# Patient Record
Sex: FEMALE | Race: WHITE | Employment: OTHER | URBAN - METROPOLITAN AREA
[De-identification: names, ages, dates, MRNs, and addresses within clinical notes are randomized per-mention and may not be internally consistent; named-entity substitution may affect disease eponyms.]

---

## 2021-01-01 ENCOUNTER — HOSPITAL ENCOUNTER (INPATIENT)
Age: 81
LOS: 6 days | Discharge: HOSPICE | DRG: 840 | End: 2021-06-03
Attending: INTERNAL MEDICINE | Admitting: INTERNAL MEDICINE
Payer: MEDICARE

## 2021-01-01 ENCOUNTER — HOSPITAL ENCOUNTER (INPATIENT)
Age: 81
LOS: 7 days | Discharge: ANOTHER HOSPITAL | DRG: 840 | End: 2021-05-28
Attending: EMERGENCY MEDICINE | Admitting: HOSPITALIST
Payer: MEDICARE

## 2021-01-01 ENCOUNTER — APPOINTMENT (OUTPATIENT)
Dept: GENERAL RADIOLOGY | Age: 81
DRG: 840 | End: 2021-01-01
Attending: HOSPITALIST
Payer: MEDICARE

## 2021-01-01 ENCOUNTER — APPOINTMENT (OUTPATIENT)
Dept: CT IMAGING | Age: 81
DRG: 840 | End: 2021-01-01
Attending: HOSPITALIST
Payer: MEDICARE

## 2021-01-01 ENCOUNTER — APPOINTMENT (OUTPATIENT)
Dept: GENERAL RADIOLOGY | Age: 81
DRG: 840 | End: 2021-01-01
Attending: EMERGENCY MEDICINE
Payer: MEDICARE

## 2021-01-01 ENCOUNTER — APPOINTMENT (OUTPATIENT)
Dept: MRI IMAGING | Age: 81
DRG: 840 | End: 2021-01-01
Attending: HOSPITALIST
Payer: MEDICARE

## 2021-01-01 ENCOUNTER — APPOINTMENT (OUTPATIENT)
Dept: ULTRASOUND IMAGING | Age: 81
DRG: 840 | End: 2021-01-01
Attending: INTERNAL MEDICINE
Payer: MEDICARE

## 2021-01-01 ENCOUNTER — HOSPICE ADMISSION (OUTPATIENT)
Dept: HOSPICE | Facility: HOSPICE | Age: 81
End: 2021-01-01
Payer: MEDICARE

## 2021-01-01 ENCOUNTER — APPOINTMENT (OUTPATIENT)
Dept: CT IMAGING | Age: 81
DRG: 840 | End: 2021-01-01
Attending: EMERGENCY MEDICINE
Payer: MEDICARE

## 2021-01-01 ENCOUNTER — HOSPITAL ENCOUNTER (INPATIENT)
Age: 81
LOS: 8 days | End: 2021-06-11
Attending: INTERNAL MEDICINE | Admitting: INTERNAL MEDICINE
Payer: MEDICARE

## 2021-01-01 ENCOUNTER — APPOINTMENT (OUTPATIENT)
Dept: CT IMAGING | Age: 81
DRG: 840 | End: 2021-01-01
Attending: NURSE PRACTITIONER
Payer: MEDICARE

## 2021-01-01 ENCOUNTER — APPOINTMENT (OUTPATIENT)
Dept: GENERAL RADIOLOGY | Age: 81
End: 2021-01-01
Attending: EMERGENCY MEDICINE
Payer: MEDICARE

## 2021-01-01 ENCOUNTER — HOSPITAL ENCOUNTER (EMERGENCY)
Age: 81
Discharge: HOME | End: 2021-04-12
Attending: EMERGENCY MEDICINE
Payer: MEDICARE

## 2021-01-01 ENCOUNTER — APPOINTMENT (OUTPATIENT)
Dept: MRI IMAGING | Age: 81
DRG: 840 | End: 2021-01-01
Attending: PSYCHIATRY & NEUROLOGY
Payer: MEDICARE

## 2021-01-01 VITALS
OXYGEN SATURATION: 98 % | BODY MASS INDEX: 30.3 KG/M2 | DIASTOLIC BLOOD PRESSURE: 59 MMHG | SYSTOLIC BLOOD PRESSURE: 148 MMHG | WEIGHT: 177.47 LBS | HEART RATE: 87 BPM | HEIGHT: 64 IN | TEMPERATURE: 98.5 F | RESPIRATION RATE: 20 BRPM

## 2021-01-01 VITALS
SYSTOLIC BLOOD PRESSURE: 158 MMHG | TEMPERATURE: 98.4 F | OXYGEN SATURATION: 98 % | HEART RATE: 67 BPM | HEIGHT: 64 IN | BODY MASS INDEX: 30.73 KG/M2 | DIASTOLIC BLOOD PRESSURE: 70 MMHG | WEIGHT: 180 LBS | RESPIRATION RATE: 16 BRPM

## 2021-01-01 VITALS
WEIGHT: 182.7 LBS | HEIGHT: 64 IN | RESPIRATION RATE: 22 BRPM | BODY MASS INDEX: 31.19 KG/M2 | OXYGEN SATURATION: 99 % | SYSTOLIC BLOOD PRESSURE: 119 MMHG | DIASTOLIC BLOOD PRESSURE: 86 MMHG | HEART RATE: 104 BPM | TEMPERATURE: 98.2 F

## 2021-01-01 VITALS
TEMPERATURE: 97.6 F | DIASTOLIC BLOOD PRESSURE: 75 MMHG | SYSTOLIC BLOOD PRESSURE: 175 MMHG | HEART RATE: 115 BPM | OXYGEN SATURATION: 95 % | RESPIRATION RATE: 24 BRPM

## 2021-01-01 DIAGNOSIS — N17.0 ACUTE RENAL FAILURE WITH TUBULAR NECROSIS (HCC): ICD-10-CM

## 2021-01-01 DIAGNOSIS — R41.82 ALTERED MENTAL STATUS, UNSPECIFIED ALTERED MENTAL STATUS TYPE: ICD-10-CM

## 2021-01-01 DIAGNOSIS — G93.40 ACUTE ENCEPHALOPATHY: Primary | ICD-10-CM

## 2021-01-01 DIAGNOSIS — R53.81 DEBILITY: ICD-10-CM

## 2021-01-01 DIAGNOSIS — C90.00 MULTIPLE MYELOMA NOT HAVING ACHIEVED REMISSION (HCC): ICD-10-CM

## 2021-01-01 DIAGNOSIS — Z51.5 ENCOUNTER FOR PALLIATIVE CARE: ICD-10-CM

## 2021-01-01 DIAGNOSIS — G93.40 ACUTE ENCEPHALOPATHY: ICD-10-CM

## 2021-01-01 DIAGNOSIS — M54.31 SCIATICA OF RIGHT SIDE: Primary | ICD-10-CM

## 2021-01-01 DIAGNOSIS — Z71.89 ACP (ADVANCE CARE PLANNING): ICD-10-CM

## 2021-01-01 DIAGNOSIS — E86.0 DEHYDRATION: ICD-10-CM

## 2021-01-01 DIAGNOSIS — I25.10 CORONARY ARTERY DISEASE INVOLVING NATIVE CORONARY ARTERY OF NATIVE HEART WITHOUT ANGINA PECTORIS: ICD-10-CM

## 2021-01-01 DIAGNOSIS — R52 PAIN: ICD-10-CM

## 2021-01-01 DIAGNOSIS — E83.52 HYPERCALCEMIA: Primary | ICD-10-CM

## 2021-01-01 DIAGNOSIS — I95.0 IDIOPATHIC HYPOTENSION: ICD-10-CM

## 2021-01-01 DIAGNOSIS — E83.52 HYPERCALCEMIA: ICD-10-CM

## 2021-01-01 DIAGNOSIS — E11.9 TYPE 2 DIABETES MELLITUS WITHOUT COMPLICATION, WITHOUT LONG-TERM CURRENT USE OF INSULIN (HCC): ICD-10-CM

## 2021-01-01 DIAGNOSIS — D53.1 OTHER MEGALOBLASTIC ANEMIA: ICD-10-CM

## 2021-01-01 DIAGNOSIS — N39.0 URINARY TRACT INFECTION WITHOUT HEMATURIA, SITE UNSPECIFIED: ICD-10-CM

## 2021-01-01 DIAGNOSIS — R53.1 WEAKNESS: ICD-10-CM

## 2021-01-01 DIAGNOSIS — G89.3 CANCER ASSOCIATED PAIN: ICD-10-CM

## 2021-01-01 LAB
25(OH)D3 SERPL-MCNC: 12.1 NG/ML (ref 30–100)
ALBUMIN SERPL ELPH-MCNC: 3.28 G/DL (ref 3.2–5.6)
ALBUMIN SERPL-MCNC: 2.8 G/DL (ref 3.2–4.6)
ALBUMIN SERPL-MCNC: 3.1 G/DL (ref 3.2–4.6)
ALBUMIN SERPL-MCNC: 3.3 G/DL (ref 3.2–4.6)
ALBUMIN SERPL-MCNC: 3.5 G/DL (ref 3.2–4.6)
ALBUMIN UR ELPH-MCNC: 7.2 MG/DL
ALBUMIN/GLOB SERPL: 0.7 {RATIO}
ALBUMIN/GLOB SERPL: 0.9 {RATIO} (ref 1.2–3.5)
ALBUMIN/GLOB SERPL: 1.1 {RATIO} (ref 1.2–3.5)
ALBUMIN/GLOB SERPL: 1.2 {RATIO} (ref 1.2–3.5)
ALBUMIN/GLOB SERPL: 1.4 {RATIO}
ALP SERPL-CCNC: 131 U/L (ref 50–130)
ALP SERPL-CCNC: 146 U/L (ref 50–130)
ALP SERPL-CCNC: 146 U/L (ref 50–130)
ALP SERPL-CCNC: 147 U/L (ref 50–130)
ALP SERPL-CCNC: 81 U/L (ref 50–136)
ALP SERPL-CCNC: 84 U/L (ref 50–136)
ALP SERPL-CCNC: 85 U/L (ref 50–136)
ALP SERPL-CCNC: 86 U/L (ref 50–136)
ALPHA1 GLOB 24H UR ELPH-MCNC: 0.7 MG/DL
ALPHA1 GLOB SERPL ELPH-MCNC: 0.16 G/DL (ref 0.1–0.4)
ALPHA2 GLOB SERPL ELPH-MCNC: 0.55 G/DL (ref 0.4–1.2)
ALPHA2 GLOB SERPL ELPH-MCNC: 2 MG/DL
ALT SERPL-CCNC: 17 U/L (ref 12–65)
ALT SERPL-CCNC: 18 U/L (ref 12–65)
ALT SERPL-CCNC: 19 U/L (ref 12–65)
ALT SERPL-CCNC: 21 U/L (ref 12–65)
ALT SERPL-CCNC: 30 U/L (ref 12–65)
ALT SERPL-CCNC: 34 U/L (ref 12–65)
ALT SERPL-CCNC: 35 U/L (ref 12–65)
ALT SERPL-CCNC: 38 U/L (ref 12–65)
AMMONIA PLAS-SCNC: 17 UMOL/L (ref 24.9–68)
AMMONIA PLAS-SCNC: 43 UMOL/L (ref 11–32)
AMMONIA PLAS-SCNC: 52 UMOL/L (ref 24.9–68)
ANION GAP SERPL CALC-SCNC: 10 MMOL/L (ref 7–16)
ANION GAP SERPL CALC-SCNC: 10 MMOL/L (ref 7–16)
ANION GAP SERPL CALC-SCNC: 2 MMOL/L (ref 7–16)
ANION GAP SERPL CALC-SCNC: 2 MMOL/L (ref 7–16)
ANION GAP SERPL CALC-SCNC: 4 MMOL/L (ref 7–16)
ANION GAP SERPL CALC-SCNC: 5 MMOL/L (ref 7–16)
ANION GAP SERPL CALC-SCNC: 6 MMOL/L (ref 7–16)
ANION GAP SERPL CALC-SCNC: 6 MMOL/L (ref 7–16)
ANION GAP SERPL CALC-SCNC: 7 MMOL/L (ref 7–16)
ANION GAP SERPL CALC-SCNC: 8 MMOL/L (ref 7–16)
APPEARANCE UR: ABNORMAL
APPEARANCE UR: ABNORMAL
AST SERPL-CCNC: 40 U/L (ref 15–37)
AST SERPL-CCNC: 42 U/L (ref 15–37)
AST SERPL-CCNC: 44 U/L (ref 15–37)
AST SERPL-CCNC: 44 U/L (ref 15–37)
AST SERPL-CCNC: 46 U/L (ref 15–37)
AST SERPL-CCNC: 50 U/L (ref 15–37)
AST SERPL-CCNC: 59 U/L (ref 15–37)
AST SERPL-CCNC: 75 U/L (ref 15–37)
ATRIAL RATE: 202 BPM
B-GLOBULIN SERPL QL ELPH: 0.74 G/DL (ref 0.6–1.3)
B-GLOBULIN UR QL ELPH: 4.2 MG/DL
B2 MICROGLOB SERPL-MCNC: 6.3 MG/L (ref 0.8–2.34)
BACTERIA SPEC CULT: ABNORMAL
BACTERIA SPEC CULT: ABNORMAL
BACTERIA SPEC CULT: NORMAL
BACTERIA SPEC CULT: NORMAL
BACTERIA URNS QL MICRO: 0 /HPF
BACTERIA URNS QL MICRO: ABNORMAL /HPF
BASOPHILS # BLD: 0 K/UL (ref 0–0.2)
BASOPHILS # BLD: 0.1 K/UL (ref 0–0.2)
BASOPHILS # BLD: 0.1 K/UL (ref 0–0.2)
BASOPHILS NFR BLD MANUAL: 1 % (ref 0–2)
BASOPHILS NFR BLD: 0 % (ref 0–2)
BASOPHILS NFR BLD: 0 % (ref 0–2)
BASOPHILS NFR BLD: 1 % (ref 0–2)
BILIRUB SERPL-MCNC: 0.7 MG/DL (ref 0.2–1.1)
BILIRUB SERPL-MCNC: 0.7 MG/DL (ref 0.2–1.1)
BILIRUB SERPL-MCNC: 0.8 MG/DL (ref 0.2–1.1)
BILIRUB SERPL-MCNC: 0.8 MG/DL (ref 0.2–1.1)
BILIRUB SERPL-MCNC: 1.1 MG/DL (ref 0.2–1.1)
BILIRUB SERPL-MCNC: 1.2 MG/DL (ref 0.2–1.1)
BILIRUB UR QL: ABNORMAL
BILIRUB UR QL: NEGATIVE
BONE MARROW PREP & W,BMA: NORMAL
BUN SERPL-MCNC: 15 MG/DL (ref 8–23)
BUN SERPL-MCNC: 16 MG/DL (ref 8–23)
BUN SERPL-MCNC: 16 MG/DL (ref 8–23)
BUN SERPL-MCNC: 17 MG/DL (ref 8–23)
BUN SERPL-MCNC: 18 MG/DL (ref 8–23)
BUN SERPL-MCNC: 20 MG/DL (ref 8–23)
BUN SERPL-MCNC: 21 MG/DL (ref 8–23)
BUN SERPL-MCNC: 24 MG/DL (ref 8–23)
BUN SERPL-MCNC: 26 MG/DL (ref 8–23)
BUN SERPL-MCNC: 29 MG/DL (ref 8–23)
BUN SERPL-MCNC: 29 MG/DL (ref 8–23)
BUN SERPL-MCNC: 33 MG/DL (ref 8–23)
CALCIUM SERPL-MCNC: 10.1 MG/DL (ref 8.3–10.4)
CALCIUM SERPL-MCNC: 10.1 MG/DL (ref 8.3–10.4)
CALCIUM SERPL-MCNC: 10.8 MG/DL (ref 8.3–10.4)
CALCIUM SERPL-MCNC: 11.8 MG/DL (ref 8.3–10.4)
CALCIUM SERPL-MCNC: 13.5 MG/DL (ref 8.3–10.4)
CALCIUM SERPL-MCNC: 13.7 MG/DL (ref 8.3–10.4)
CALCIUM SERPL-MCNC: 13.8 MG/DL (ref 8.3–10.4)
CALCIUM SERPL-MCNC: 8.1 MG/DL (ref 8.3–10.4)
CALCIUM SERPL-MCNC: 8.3 MG/DL (ref 8.3–10.4)
CALCIUM SERPL-MCNC: 8.4 MG/DL (ref 8.3–10.4)
CALCIUM SERPL-MCNC: 8.5 MG/DL (ref 8.3–10.4)
CALCIUM SERPL-MCNC: 8.7 MG/DL (ref 8.3–10.4)
CALCIUM SERPL-MCNC: 8.7 MG/DL (ref 8.3–10.4)
CALCIUM SERPL-MCNC: 8.9 MG/DL (ref 8.3–10.4)
CALCIUM SERPL-MCNC: 9.1 MG/DL (ref 8.3–10.4)
CALCIUM SERPL-MCNC: 9.3 MG/DL (ref 8.3–10.4)
CALCULATED R AXIS, ECG10: 75 DEGREES
CALCULATED T AXIS, ECG11: 38 DEGREES
CANCER AG15-3 SERPL-ACNC: 14.7 U/ML (ref 1–35)
CANCER AG27-29 SERPL-ACNC: 21.9 U/ML (ref 0–38.6)
CASTS URNS QL MICRO: ABNORMAL /LPF
CASTS URNS QL MICRO: ABNORMAL /LPF
CHLORIDE SERPL-SCNC: 104 MMOL/L (ref 98–107)
CHLORIDE SERPL-SCNC: 105 MMOL/L (ref 98–107)
CHLORIDE SERPL-SCNC: 106 MMOL/L (ref 98–107)
CHLORIDE SERPL-SCNC: 107 MMOL/L (ref 98–107)
CHLORIDE SERPL-SCNC: 108 MMOL/L (ref 98–107)
CHLORIDE SERPL-SCNC: 109 MMOL/L (ref 98–107)
CHLORIDE SERPL-SCNC: 110 MMOL/L (ref 98–107)
CHLORIDE SERPL-SCNC: 111 MMOL/L (ref 98–107)
CHLORIDE SERPL-SCNC: 112 MMOL/L (ref 98–107)
CHLORIDE SERPL-SCNC: 114 MMOL/L (ref 98–107)
CK SERPL-CCNC: 343 U/L (ref 21–215)
CO2 SERPL-SCNC: 22 MMOL/L (ref 21–32)
CO2 SERPL-SCNC: 24 MMOL/L (ref 21–32)
CO2 SERPL-SCNC: 25 MMOL/L (ref 21–32)
CO2 SERPL-SCNC: 25 MMOL/L (ref 21–32)
CO2 SERPL-SCNC: 28 MMOL/L (ref 21–32)
CO2 SERPL-SCNC: 29 MMOL/L (ref 21–32)
CO2 SERPL-SCNC: 30 MMOL/L (ref 21–32)
CO2 SERPL-SCNC: 30 MMOL/L (ref 21–32)
CO2 SERPL-SCNC: 31 MMOL/L (ref 21–32)
COLLECT DURATION TIME UR: 24 HR
COLLECT DURATION TIME UR: NORMAL HR
COLOR UR: YELLOW
COLOR UR: YELLOW
CREAT SERPL-MCNC: 1.02 MG/DL (ref 0.6–1)
CREAT SERPL-MCNC: 1.42 MG/DL (ref 0.6–1)
CREAT SERPL-MCNC: 1.5 MG/DL (ref 0.6–1)
CREAT SERPL-MCNC: 1.53 MG/DL (ref 0.6–1)
CREAT SERPL-MCNC: 1.54 MG/DL (ref 0.6–1)
CREAT SERPL-MCNC: 1.56 MG/DL (ref 0.6–1)
CREAT SERPL-MCNC: 1.63 MG/DL (ref 0.6–1)
CREAT SERPL-MCNC: 1.65 MG/DL (ref 0.6–1)
CREAT SERPL-MCNC: 1.67 MG/DL (ref 0.6–1)
CREAT SERPL-MCNC: 1.71 MG/DL (ref 0.6–1)
CREAT SERPL-MCNC: 1.75 MG/DL (ref 0.6–1)
CREAT SERPL-MCNC: 1.79 MG/DL (ref 0.6–1)
CREAT SERPL-MCNC: 1.82 MG/DL (ref 0.6–1)
CREAT SERPL-MCNC: 1.88 MG/DL (ref 0.6–1)
CRYSTALS URNS QL MICRO: 0 /LPF
CRYSTALS URNS QL MICRO: ABNORMAL /LPF
DIAGNOSIS, 93000: NORMAL
DIFFERENTIAL METHOD BLD: ABNORMAL
EOSINOPHIL # BLD: 0 K/UL (ref 0–0.8)
EOSINOPHIL # BLD: 0.1 K/UL (ref 0–0.8)
EOSINOPHIL # BLD: 0.1 K/UL (ref 0–0.8)
EOSINOPHIL NFR BLD MANUAL: 1 % (ref 1–8)
EOSINOPHIL NFR BLD: 0 % (ref 0.5–7.8)
EOSINOPHIL NFR BLD: 1 % (ref 0.5–7.8)
EPI CELLS #/AREA URNS HPF: ABNORMAL /HPF
EPI CELLS #/AREA URNS HPF: ABNORMAL /HPF
ERYTHROCYTE [DISTWIDTH] IN BLOOD BY AUTOMATED COUNT: 13.2 % (ref 11.9–14.6)
ERYTHROCYTE [DISTWIDTH] IN BLOOD BY AUTOMATED COUNT: 13.2 % (ref 11.9–14.6)
ERYTHROCYTE [DISTWIDTH] IN BLOOD BY AUTOMATED COUNT: 13.3 % (ref 11.9–14.6)
ERYTHROCYTE [DISTWIDTH] IN BLOOD BY AUTOMATED COUNT: 13.3 % (ref 11.9–14.6)
ERYTHROCYTE [DISTWIDTH] IN BLOOD BY AUTOMATED COUNT: 13.4 % (ref 11.9–14.6)
ERYTHROCYTE [DISTWIDTH] IN BLOOD BY AUTOMATED COUNT: 13.6 % (ref 11.9–14.6)
ERYTHROCYTE [DISTWIDTH] IN BLOOD BY AUTOMATED COUNT: 14.1 % (ref 11.9–14.6)
ERYTHROCYTE [DISTWIDTH] IN BLOOD BY AUTOMATED COUNT: 14.3 % (ref 11.9–14.6)
ERYTHROCYTE [DISTWIDTH] IN BLOOD BY AUTOMATED COUNT: 14.5 % (ref 11.9–14.6)
ERYTHROCYTE [DISTWIDTH] IN BLOOD BY AUTOMATED COUNT: 14.6 % (ref 11.9–14.6)
ERYTHROCYTE [DISTWIDTH] IN BLOOD BY AUTOMATED COUNT: 15 % (ref 11.9–14.6)
FLOW CYTOMETRY, FBTC1: NORMAL
GAMMA GLOB MFR SERPL ELPH: 0.87 G/DL (ref 0.5–1.6)
GAMMA GLOB MFR UR ELPH: 2.9 MG/DL
GLOBULIN SER CALC-MCNC: 2.8 G/DL (ref 2.3–3.5)
GLOBULIN SER CALC-MCNC: 2.9 G/DL (ref 2.3–3.5)
GLOBULIN SER CALC-MCNC: 3 G/DL (ref 2.3–3.5)
GLOBULIN SER CALC-MCNC: 3 G/DL (ref 2.3–3.5)
GLOBULIN SER CALC-MCNC: 3.4 G/DL (ref 2.3–3.5)
GLOBULIN SER CALC-MCNC: 3.4 G/DL (ref 2.3–3.5)
GLUCOSE BLD STRIP.AUTO-MCNC: 144 MG/DL (ref 65–100)
GLUCOSE SERPL-MCNC: 102 MG/DL (ref 65–100)
GLUCOSE SERPL-MCNC: 103 MG/DL (ref 65–100)
GLUCOSE SERPL-MCNC: 104 MG/DL (ref 65–100)
GLUCOSE SERPL-MCNC: 114 MG/DL (ref 65–100)
GLUCOSE SERPL-MCNC: 121 MG/DL (ref 65–100)
GLUCOSE SERPL-MCNC: 121 MG/DL (ref 65–100)
GLUCOSE SERPL-MCNC: 129 MG/DL (ref 65–100)
GLUCOSE SERPL-MCNC: 130 MG/DL (ref 65–100)
GLUCOSE SERPL-MCNC: 152 MG/DL (ref 65–100)
GLUCOSE SERPL-MCNC: 165 MG/DL (ref 65–100)
GLUCOSE SERPL-MCNC: 167 MG/DL (ref 65–100)
GLUCOSE SERPL-MCNC: 172 MG/DL (ref 65–100)
GLUCOSE SERPL-MCNC: 213 MG/DL (ref 65–100)
GLUCOSE SERPL-MCNC: 217 MG/DL (ref 65–100)
GLUCOSE UR STRIP.AUTO-MCNC: NEGATIVE MG/DL
GLUCOSE UR STRIP.AUTO-MCNC: NEGATIVE MG/DL
HCT VFR BLD AUTO: 25.6 % (ref 35.8–46.3)
HCT VFR BLD AUTO: 26.5 % (ref 35.8–46.3)
HCT VFR BLD AUTO: 26.6 % (ref 35.8–46.3)
HCT VFR BLD AUTO: 27.7 % (ref 35.8–46.3)
HCT VFR BLD AUTO: 28.1 % (ref 35.8–46.3)
HCT VFR BLD AUTO: 28.4 % (ref 35.8–46.3)
HCT VFR BLD AUTO: 28.5 % (ref 35.8–46.3)
HCT VFR BLD AUTO: 28.9 % (ref 35.8–46.3)
HCT VFR BLD AUTO: 30.6 % (ref 35.8–46.3)
HGB BLD-MCNC: 10.2 G/DL (ref 11.7–15.4)
HGB BLD-MCNC: 10.2 G/DL (ref 11.7–15.4)
HGB BLD-MCNC: 10.6 G/DL (ref 11.7–15.4)
HGB BLD-MCNC: 9 G/DL (ref 11.7–15.4)
HGB BLD-MCNC: 9 G/DL (ref 11.7–15.4)
HGB BLD-MCNC: 9.3 G/DL (ref 11.7–15.4)
HGB BLD-MCNC: 9.3 G/DL (ref 11.7–15.4)
HGB BLD-MCNC: 9.4 G/DL (ref 11.7–15.4)
HGB BLD-MCNC: 9.8 G/DL (ref 11.7–15.4)
HGB BLD-MCNC: 9.8 G/DL (ref 11.7–15.4)
HGB BLD-MCNC: 9.9 G/DL (ref 11.7–15.4)
HGB UR QL STRIP: ABNORMAL
HGB UR QL STRIP: ABNORMAL
IGA SERPL-MCNC: 425 MG/DL (ref 85–499)
IGG SERPL-MCNC: 697 MG/DL (ref 610–1616)
IGM SERPL-MCNC: 15 MG/DL (ref 35–242)
IMM GRANULOCYTES # BLD AUTO: 0 K/UL (ref 0–0.5)
IMM GRANULOCYTES # BLD AUTO: 0.1 K/UL (ref 0–0.5)
IMM GRANULOCYTES # BLD AUTO: 0.4 K/UL (ref 0–0.5)
IMM GRANULOCYTES NFR BLD AUTO: 1 % (ref 0–5)
IMM GRANULOCYTES NFR BLD AUTO: 6 % (ref 0–5)
KAPPA LC FREE SER-MCNC: 12.15 MG/L (ref 3.3–19.4)
KAPPA LC FREE/LAMBDA FREE SER: 0.99 {RATIO} (ref 0.26–1.65)
KAPPA LC UR-MCNC: 9.22 MG/L (ref 0.63–113.79)
KAPPA LC/LAMBDA UR: 5.3 {RATIO} (ref 1.03–31.76)
KETONES UR QL STRIP.AUTO: ABNORMAL MG/DL
KETONES UR QL STRIP.AUTO: ABNORMAL MG/DL
LAMBDA LC FREE SERPL-MCNC: 12.29 MG/L (ref 5.71–26.3)
LAMBDA LC UR-MCNC: 1.74 MG/L (ref 0.47–11.77)
LDH SERPL L TO P-CCNC: 497 U/L (ref 110–210)
LEUKOCYTE ESTERASE UR QL STRIP.AUTO: NEGATIVE
LEUKOCYTE ESTERASE UR QL STRIP.AUTO: NEGATIVE
LYMPHOCYTES # BLD: 0.5 K/UL (ref 0.5–4.6)
LYMPHOCYTES # BLD: 0.6 K/UL (ref 0.5–4.6)
LYMPHOCYTES # BLD: 0.7 K/UL (ref 0.5–4.6)
LYMPHOCYTES # BLD: 0.7 K/UL (ref 0.5–4.6)
LYMPHOCYTES # BLD: 1.2 K/UL (ref 0.5–4.6)
LYMPHOCYTES # BLD: 1.2 K/UL (ref 0.5–4.6)
LYMPHOCYTES # BLD: 1.6 K/UL (ref 0.5–4.6)
LYMPHOCYTES NFR BLD MANUAL: 10 % (ref 16–44)
LYMPHOCYTES NFR BLD MANUAL: 9 % (ref 16–44)
LYMPHOCYTES NFR BLD: 10 % (ref 13–44)
LYMPHOCYTES NFR BLD: 13 % (ref 13–44)
LYMPHOCYTES NFR BLD: 20 % (ref 13–44)
LYMPHOCYTES NFR BLD: 22 % (ref 13–44)
LYMPHOCYTES NFR BLD: 22 % (ref 13–44)
M PROTEIN SERPL ELPH-MCNC: 0.28 G/DL
M PROTEIN UR-MCNC: NORMAL MG/DL
MAGNESIUM SERPL-MCNC: 1.3 MG/DL (ref 1.8–2.4)
MAGNESIUM SERPL-MCNC: 1.4 MG/DL (ref 1.8–2.4)
MAGNESIUM SERPL-MCNC: 1.5 MG/DL (ref 1.8–2.4)
MCH RBC QN AUTO: 31.2 PG (ref 26.1–32.9)
MCH RBC QN AUTO: 31.2 PG (ref 26.1–32.9)
MCH RBC QN AUTO: 31.3 PG (ref 26.1–32.9)
MCH RBC QN AUTO: 31.4 PG (ref 26.1–32.9)
MCH RBC QN AUTO: 31.5 PG (ref 26.1–32.9)
MCH RBC QN AUTO: 31.6 PG (ref 26.1–32.9)
MCH RBC QN AUTO: 31.7 PG (ref 26.1–32.9)
MCH RBC QN AUTO: 31.7 PG (ref 26.1–32.9)
MCH RBC QN AUTO: 32.6 PG (ref 26.1–32.9)
MCHC RBC AUTO-ENTMCNC: 33.1 G/DL (ref 31.4–35)
MCHC RBC AUTO-ENTMCNC: 33.8 G/DL (ref 31.4–35)
MCHC RBC AUTO-ENTMCNC: 33.9 G/DL (ref 31.4–35)
MCHC RBC AUTO-ENTMCNC: 34.4 G/DL (ref 31.4–35)
MCHC RBC AUTO-ENTMCNC: 34.6 G/DL (ref 31.4–35)
MCHC RBC AUTO-ENTMCNC: 34.9 G/DL (ref 31.4–35)
MCHC RBC AUTO-ENTMCNC: 34.9 G/DL (ref 31.4–35)
MCHC RBC AUTO-ENTMCNC: 35.1 G/DL (ref 31.4–35)
MCHC RBC AUTO-ENTMCNC: 35.2 G/DL (ref 31.4–35)
MCHC RBC AUTO-ENTMCNC: 35.3 G/DL (ref 31.4–35)
MCHC RBC AUTO-ENTMCNC: 36.3 G/DL (ref 31.4–35)
MCV RBC AUTO: 89.6 FL (ref 79.6–97.8)
MCV RBC AUTO: 89.8 FL (ref 79.6–97.8)
MCV RBC AUTO: 89.8 FL (ref 79.6–97.8)
MCV RBC AUTO: 90.1 FL (ref 79.6–97.8)
MCV RBC AUTO: 90.1 FL (ref 79.6–97.8)
MCV RBC AUTO: 90.6 FL (ref 79.6–97.8)
MCV RBC AUTO: 91.3 FL (ref 79.6–97.8)
MCV RBC AUTO: 91.6 FL (ref 79.6–97.8)
MCV RBC AUTO: 92 FL (ref 79.6–97.8)
MCV RBC AUTO: 92.7 FL (ref 79.6–97.8)
MCV RBC AUTO: 94.6 FL (ref 79.6–97.8)
METAMYELOCYTES NFR BLD MANUAL: 1 %
MM INDURATION POC: 0 MM (ref 0–5)
MONOCYTES # BLD: 0.2 K/UL (ref 0.1–1.3)
MONOCYTES # BLD: 0.4 K/UL (ref 0.1–1.3)
MONOCYTES # BLD: 0.5 K/UL (ref 0.1–1.3)
MONOCYTES # BLD: 0.5 K/UL (ref 0.1–1.3)
MONOCYTES # BLD: 0.6 K/UL (ref 0.1–1.3)
MONOCYTES # BLD: 0.6 K/UL (ref 0.1–1.3)
MONOCYTES # BLD: 0.7 K/UL (ref 0.1–1.3)
MONOCYTES NFR BLD MANUAL: 3 % (ref 3–9)
MONOCYTES NFR BLD MANUAL: 9 % (ref 3–9)
MONOCYTES NFR BLD: 10 % (ref 4–12)
MONOCYTES NFR BLD: 11 % (ref 4–12)
MONOCYTES NFR BLD: 7 % (ref 4–12)
MONOCYTES NFR BLD: 9 % (ref 4–12)
MONOCYTES NFR BLD: 9 % (ref 4–12)
MUCOUS THREADS URNS QL MICRO: 0 /LPF
MYELOCYTES NFR BLD MANUAL: 5 %
MYELOCYTES NFR BLD MANUAL: 5 %
NEUTS BAND NFR BLD MANUAL: 11 % (ref 0–6)
NEUTS BAND NFR BLD MANUAL: 8 % (ref 0–6)
NEUTS SEG # BLD: 3.5 K/UL (ref 1.7–8.2)
NEUTS SEG # BLD: 4.1 K/UL (ref 1.7–8.2)
NEUTS SEG # BLD: 4.1 K/UL (ref 1.7–8.2)
NEUTS SEG # BLD: 4.4 K/UL (ref 1.7–8.2)
NEUTS SEG # BLD: 4.9 K/UL (ref 1.7–8.2)
NEUTS SEG # BLD: 5.1 K/UL (ref 1.7–8.2)
NEUTS SEG # BLD: 6 K/UL (ref 1.7–8.2)
NEUTS SEG NFR BLD MANUAL: 65 % (ref 47–75)
NEUTS SEG NFR BLD MANUAL: 72 % (ref 47–75)
NEUTS SEG NFR BLD: 60 % (ref 43–78)
NEUTS SEG NFR BLD: 65 % (ref 43–78)
NEUTS SEG NFR BLD: 69 % (ref 43–78)
NEUTS SEG NFR BLD: 77 % (ref 43–78)
NEUTS SEG NFR BLD: 82 % (ref 43–78)
NITRITE UR QL STRIP.AUTO: NEGATIVE
NITRITE UR QL STRIP.AUTO: NEGATIVE
NRBC # BLD: 0 K/UL (ref 0–0.2)
NRBC # BLD: 0.02 K/UL (ref 0–0.2)
NRBC # BLD: 0.02 K/UL (ref 0–0.2)
NRBC # BLD: 0.04 K/UL (ref 0–0.2)
OTHER OBSERVATIONS,UCOM: ABNORMAL
PH UR STRIP: 5 [PH] (ref 5–9)
PH UR STRIP: 8 [PH] (ref 5–9)
PLATELET # BLD AUTO: 100 K/UL (ref 150–450)
PLATELET # BLD AUTO: 111 K/UL (ref 150–450)
PLATELET # BLD AUTO: 115 K/UL (ref 150–450)
PLATELET # BLD AUTO: 120 K/UL (ref 150–450)
PLATELET # BLD AUTO: 124 K/UL (ref 150–450)
PLATELET # BLD AUTO: 124 K/UL (ref 150–450)
PLATELET # BLD AUTO: 128 K/UL (ref 150–450)
PLATELET # BLD AUTO: 129 K/UL (ref 150–450)
PLATELET # BLD AUTO: 134 K/UL (ref 150–450)
PLATELET # BLD AUTO: 143 K/UL (ref 150–450)
PLATELET # BLD AUTO: 150 K/UL (ref 150–450)
PLATELET COMMENTS,PCOM: ADEQUATE
PLATELET COMMENTS,PCOM: ADEQUATE
PMV BLD AUTO: 10.1 FL (ref 9.4–12.3)
PMV BLD AUTO: 10.3 FL (ref 9.4–12.3)
PMV BLD AUTO: 10.3 FL (ref 9.4–12.3)
PMV BLD AUTO: 10.4 FL (ref 9.4–12.3)
PMV BLD AUTO: 10.5 FL (ref 9.4–12.3)
PMV BLD AUTO: 9.7 FL (ref 9.4–12.3)
PMV BLD AUTO: 9.8 FL (ref 9.4–12.3)
PMV BLD AUTO: 9.9 FL (ref 9.4–12.3)
PMV BLD AUTO: 9.9 FL (ref 9.4–12.3)
POTASSIUM SERPL-SCNC: 2.9 MMOL/L (ref 3.5–5.1)
POTASSIUM SERPL-SCNC: 3.2 MMOL/L (ref 3.5–5.1)
POTASSIUM SERPL-SCNC: 3.4 MMOL/L (ref 3.5–5.1)
POTASSIUM SERPL-SCNC: 3.5 MMOL/L (ref 3.5–5.1)
POTASSIUM SERPL-SCNC: 3.6 MMOL/L (ref 3.5–5.1)
POTASSIUM SERPL-SCNC: 3.7 MMOL/L (ref 3.5–5.1)
POTASSIUM SERPL-SCNC: 3.7 MMOL/L (ref 3.5–5.1)
POTASSIUM SERPL-SCNC: 3.8 MMOL/L (ref 3.5–5.1)
POTASSIUM SERPL-SCNC: 4 MMOL/L (ref 3.5–5.1)
POTASSIUM SERPL-SCNC: 4.6 MMOL/L (ref 3.5–5.1)
POTASSIUM SERPL-SCNC: 4.7 MMOL/L (ref 3.5–5.1)
PPD POC: NEGATIVE NEGATIVE
PROCALCITONIN SERPL-MCNC: <0.05 NG/ML
PROT 24H UR-MRATE: 208 MG/24HR
PROT PATTERN SERPL ELPH-IMP: ABNORMAL
PROT PATTERN SPEC IFE-IMP: ABNORMAL
PROT PATTERN SPEC IFE-IMP: NORMAL
PROT PATTERN UR ELPH-IMP: NORMAL
PROT SERPL-MCNC: 5.6 G/DL (ref 6.3–8.2)
PROT SERPL-MCNC: 5.8 G/DL (ref 6.3–8.2)
PROT SERPL-MCNC: 5.9 G/DL (ref 6.3–8.2)
PROT SERPL-MCNC: 6.2 G/DL (ref 6.3–8.2)
PROT SERPL-MCNC: 6.5 G/DL (ref 6.3–8.2)
PROT UR STRIP-MCNC: ABNORMAL MG/DL
PROT UR STRIP-MCNC: ABNORMAL MG/DL
PROT UR-MCNC: 17 MG/DL
PTH-INTACT SERPL-MCNC: <6.3 PG/ML (ref 18.5–88)
Q-T INTERVAL, ECG07: 366 MS
QRS DURATION, ECG06: 92 MS
QTC CALCULATION (BEZET), ECG08: 403 MS
RBC # BLD AUTO: 2.84 M/UL (ref 4.05–5.2)
RBC # BLD AUTO: 2.87 M/UL (ref 4.05–5.2)
RBC # BLD AUTO: 2.94 M/UL (ref 4.05–5.2)
RBC # BLD AUTO: 2.97 M/UL (ref 4.05–5.2)
RBC # BLD AUTO: 3.01 M/UL (ref 4.05–5.2)
RBC # BLD AUTO: 3.1 M/UL (ref 4.05–5.2)
RBC # BLD AUTO: 3.11 M/UL (ref 4.05–5.2)
RBC # BLD AUTO: 3.13 M/UL (ref 4.05–5.2)
RBC # BLD AUTO: 3.17 M/UL (ref 4.05–5.2)
RBC # BLD AUTO: 3.22 M/UL (ref 4.05–5.2)
RBC # BLD AUTO: 3.35 M/UL (ref 4.05–5.2)
RBC #/AREA URNS HPF: ABNORMAL /HPF
RBC #/AREA URNS HPF: ABNORMAL /HPF
RBC MORPH BLD: ABNORMAL
RBC MORPH BLD: ABNORMAL
SERVICE CMNT-IMP: ABNORMAL
SERVICE CMNT-IMP: ABNORMAL
SERVICE CMNT-IMP: NORMAL
SERVICE CMNT-IMP: NORMAL
SODIUM SERPL-SCNC: 136 MMOL/L (ref 136–145)
SODIUM SERPL-SCNC: 140 MMOL/L (ref 136–145)
SODIUM SERPL-SCNC: 140 MMOL/L (ref 136–145)
SODIUM SERPL-SCNC: 141 MMOL/L (ref 136–145)
SODIUM SERPL-SCNC: 142 MMOL/L (ref 136–145)
SODIUM SERPL-SCNC: 143 MMOL/L (ref 136–145)
SODIUM SERPL-SCNC: 144 MMOL/L (ref 136–145)
SP GR UR REFRACTOMETRY: 1.01 (ref 1–1.02)
SP GR UR REFRACTOMETRY: 1.02 (ref 1–1.02)
SPECIMEN SOURCE: NORMAL
SPECIMEN VOL ?TM UR: 1225 ML
SPECIMEN VOL ?TM UR: NORMAL ML
TEST ORDERED:: NORMAL
TSH SERPL DL<=0.005 MIU/L-ACNC: 3.44 UIU/ML (ref 0.36–3.74)
URATE SERPL-MCNC: 3 MG/DL (ref 2.6–6)
UROBILINOGEN UR QL STRIP.AUTO: 1 EU/DL (ref 0.2–1)
UROBILINOGEN UR QL STRIP.AUTO: 2 EU/DL (ref 0.2–1)
VENTRICULAR RATE, ECG03: 73 BPM
WBC # BLD AUTO: 4.7 K/UL (ref 4.3–11.1)
WBC # BLD AUTO: 5.4 K/UL (ref 4.3–11.1)
WBC # BLD AUTO: 5.4 K/UL (ref 4.3–11.1)
WBC # BLD AUTO: 5.5 K/UL (ref 4.3–11.1)
WBC # BLD AUTO: 5.7 K/UL (ref 4.3–11.1)
WBC # BLD AUTO: 5.9 K/UL (ref 4.3–11.1)
WBC # BLD AUTO: 6 K/UL (ref 4.3–11.1)
WBC # BLD AUTO: 6.1 K/UL (ref 4.3–11.1)
WBC # BLD AUTO: 6.5 K/UL (ref 4.3–11.1)
WBC # BLD AUTO: 7.1 K/UL (ref 4.3–11.1)
WBC # BLD AUTO: 7.3 K/UL (ref 4.3–11.1)
WBC MORPH BLD: ABNORMAL
WBC URNS QL MICRO: ABNORMAL /HPF
WBC URNS QL MICRO: ABNORMAL /HPF

## 2021-01-01 PROCEDURE — 74011250637 HC RX REV CODE- 250/637: Performed by: INTERNAL MEDICINE

## 2021-01-01 PROCEDURE — 74011250637 HC RX REV CODE- 250/637: Performed by: HOSPITALIST

## 2021-01-01 PROCEDURE — 82962 GLUCOSE BLOOD TEST: CPT

## 2021-01-01 PROCEDURE — 0656 HSPC GENERAL INPATIENT

## 2021-01-01 PROCEDURE — 65270000029 HC RM PRIVATE

## 2021-01-01 PROCEDURE — 74011000250 HC RX REV CODE- 250: Performed by: INTERNAL MEDICINE

## 2021-01-01 PROCEDURE — G0299 HHS/HOSPICE OF RN EA 15 MIN: HCPCS

## 2021-01-01 PROCEDURE — 36415 COLL VENOUS BLD VENIPUNCTURE: CPT

## 2021-01-01 PROCEDURE — 86580 TB INTRADERMAL TEST: CPT | Performed by: HOSPITALIST

## 2021-01-01 PROCEDURE — 74011000258 HC RX REV CODE- 258: Performed by: INTERNAL MEDICINE

## 2021-01-01 PROCEDURE — 88311 DECALCIFY TISSUE: CPT

## 2021-01-01 PROCEDURE — 2709999900 HC NON-CHARGEABLE SUPPLY

## 2021-01-01 PROCEDURE — 3336500001 HSPC ELECTION

## 2021-01-01 PROCEDURE — 77030038269 HC DRN EXT URIN PURWCK BARD -A

## 2021-01-01 PROCEDURE — 74011250636 HC RX REV CODE- 250/636: Performed by: INTERNAL MEDICINE

## 2021-01-01 PROCEDURE — 96360 HYDRATION IV INFUSION INIT: CPT

## 2021-01-01 PROCEDURE — 70450 CT HEAD/BRAIN W/O DYE: CPT

## 2021-01-01 PROCEDURE — 74011000636 HC RX REV CODE- 636: Performed by: INTERNAL MEDICINE

## 2021-01-01 PROCEDURE — 72100 X-RAY EXAM L-S SPINE 2/3 VWS: CPT

## 2021-01-01 PROCEDURE — 74011250636 HC RX REV CODE- 250/636: Performed by: HOSPITALIST

## 2021-01-01 PROCEDURE — 99223 1ST HOSP IP/OBS HIGH 75: CPT | Performed by: NURSE PRACTITIONER

## 2021-01-01 PROCEDURE — 99233 SBSQ HOSP IP/OBS HIGH 50: CPT | Performed by: INTERNAL MEDICINE

## 2021-01-01 PROCEDURE — 81015 MICROSCOPIC EXAM OF URINE: CPT

## 2021-01-01 PROCEDURE — 81050 URINALYSIS VOLUME MEASURE: CPT

## 2021-01-01 PROCEDURE — 51798 US URINE CAPACITY MEASURE: CPT

## 2021-01-01 PROCEDURE — 83883 ASSAY NEPHELOMETRY NOT SPEC: CPT

## 2021-01-01 PROCEDURE — 87086 URINE CULTURE/COLONY COUNT: CPT

## 2021-01-01 PROCEDURE — 77075 RADEX OSSEOUS SURVEY COMPL: CPT

## 2021-01-01 PROCEDURE — 74011250637 HC RX REV CODE- 250/637: Performed by: EMERGENCY MEDICINE

## 2021-01-01 PROCEDURE — 82310 ASSAY OF CALCIUM: CPT

## 2021-01-01 PROCEDURE — 71260 CT THORAX DX C+: CPT

## 2021-01-01 PROCEDURE — 80053 COMPREHEN METABOLIC PANEL: CPT

## 2021-01-01 PROCEDURE — 82784 ASSAY IGA/IGD/IGG/IGM EACH: CPT

## 2021-01-01 PROCEDURE — 81001 URINALYSIS AUTO W/SCOPE: CPT

## 2021-01-01 PROCEDURE — 85025 COMPLETE CBC W/AUTO DIFF WBC: CPT

## 2021-01-01 PROCEDURE — 82140 ASSAY OF AMMONIA: CPT

## 2021-01-01 PROCEDURE — 88184 FLOWCYTOMETRY/ TC 1 MARKER: CPT

## 2021-01-01 PROCEDURE — 83615 LACTATE (LD) (LDH) ENZYME: CPT

## 2021-01-01 PROCEDURE — 80048 BASIC METABOLIC PNL TOTAL CA: CPT

## 2021-01-01 PROCEDURE — 99223 1ST HOSP IP/OBS HIGH 75: CPT | Performed by: INTERNAL MEDICINE

## 2021-01-01 PROCEDURE — 97530 THERAPEUTIC ACTIVITIES: CPT | Performed by: PHYSICAL THERAPIST

## 2021-01-01 PROCEDURE — 99239 HOSP IP/OBS DSCHRG MGMT >30: CPT | Performed by: INTERNAL MEDICINE

## 2021-01-01 PROCEDURE — 07DR3ZX EXTRACTION OF ILIAC BONE MARROW, PERCUTANEOUS APPROACH, DIAGNOSTIC: ICD-10-PCS | Performed by: RADIOLOGY

## 2021-01-01 PROCEDURE — 0651 HSPC ROUTINE HOME CARE

## 2021-01-01 PROCEDURE — 73502 X-RAY EXAM HIP UNI 2-3 VIEWS: CPT

## 2021-01-01 PROCEDURE — 74011250636 HC RX REV CODE- 250/636: Performed by: PSYCHIATRY & NEUROLOGY

## 2021-01-01 PROCEDURE — 88185 FLOWCYTOMETRY/TC ADD-ON: CPT

## 2021-01-01 PROCEDURE — 84550 ASSAY OF BLOOD/URIC ACID: CPT

## 2021-01-01 PROCEDURE — 88374 M/PHMTRC ALYS ISHQUANT/SEMIQ: CPT

## 2021-01-01 PROCEDURE — 73562 X-RAY EXAM OF KNEE 3: CPT

## 2021-01-01 PROCEDURE — 97112 NEUROMUSCULAR REEDUCATION: CPT

## 2021-01-01 PROCEDURE — 97530 THERAPEUTIC ACTIVITIES: CPT

## 2021-01-01 PROCEDURE — 83970 ASSAY OF PARATHORMONE: CPT

## 2021-01-01 PROCEDURE — 74011250636 HC RX REV CODE- 250/636: Performed by: EMERGENCY MEDICINE

## 2021-01-01 PROCEDURE — 74011000250 HC RX REV CODE- 250: Performed by: PSYCHIATRY & NEUROLOGY

## 2021-01-01 PROCEDURE — 85027 COMPLETE CBC AUTOMATED: CPT

## 2021-01-01 PROCEDURE — 74011000258 HC RX REV CODE- 258: Performed by: PSYCHIATRY & NEUROLOGY

## 2021-01-01 PROCEDURE — 74011250637 HC RX REV CODE- 250/637: Performed by: FAMILY MEDICINE

## 2021-01-01 PROCEDURE — 72170 X-RAY EXAM OF PELVIS: CPT

## 2021-01-01 PROCEDURE — 97535 SELF CARE MNGMENT TRAINING: CPT

## 2021-01-01 PROCEDURE — 99285 EMERGENCY DEPT VISIT HI MDM: CPT

## 2021-01-01 PROCEDURE — 88313 SPECIAL STAINS GROUP 2: CPT

## 2021-01-01 PROCEDURE — 82232 ASSAY OF BETA-2 PROTEIN: CPT

## 2021-01-01 PROCEDURE — 74011250637 HC RX REV CODE- 250/637: Performed by: NURSE PRACTITIONER

## 2021-01-01 PROCEDURE — 86300 IMMUNOASSAY TUMOR CA 15-3: CPT

## 2021-01-01 PROCEDURE — 74011250637 HC RX REV CODE- 250/637: Performed by: PSYCHIATRY & NEUROLOGY

## 2021-01-01 PROCEDURE — 86335 IMMUNFIX E-PHORSIS/URINE/CSF: CPT

## 2021-01-01 PROCEDURE — 70551 MRI BRAIN STEM W/O DYE: CPT

## 2021-01-01 PROCEDURE — 76450000000

## 2021-01-01 PROCEDURE — 88264 CHROMOSOME ANALYSIS 20-25: CPT

## 2021-01-01 PROCEDURE — 87186 SC STD MICRODIL/AGAR DIL: CPT

## 2021-01-01 PROCEDURE — 77030028872 CT BX BONE MARROW AND ASPIRATION

## 2021-01-01 PROCEDURE — 82550 ASSAY OF CK (CPK): CPT

## 2021-01-01 PROCEDURE — 51701 INSERT BLADDER CATHETER: CPT

## 2021-01-01 PROCEDURE — 99284 EMERGENCY DEPT VISIT MOD MDM: CPT

## 2021-01-01 PROCEDURE — 83735 ASSAY OF MAGNESIUM: CPT

## 2021-01-01 PROCEDURE — 82306 VITAMIN D 25 HYDROXY: CPT

## 2021-01-01 PROCEDURE — 84166 PROTEIN E-PHORESIS/URINE/CSF: CPT

## 2021-01-01 PROCEDURE — 81003 URINALYSIS AUTO W/O SCOPE: CPT

## 2021-01-01 PROCEDURE — 97162 PT EVAL MOD COMPLEX 30 MIN: CPT | Performed by: PHYSICAL THERAPIST

## 2021-01-01 PROCEDURE — 84145 PROCALCITONIN (PCT): CPT

## 2021-01-01 PROCEDURE — 87040 BLOOD CULTURE FOR BACTERIA: CPT

## 2021-01-01 PROCEDURE — 76937 US GUIDE VASCULAR ACCESS: CPT

## 2021-01-01 PROCEDURE — 95816 EEG AWAKE AND DROWSY: CPT | Performed by: PSYCHIATRY & NEUROLOGY

## 2021-01-01 PROCEDURE — 71046 X-RAY EXAM CHEST 2 VIEWS: CPT

## 2021-01-01 PROCEDURE — 74011636637 HC RX REV CODE- 636/637: Performed by: EMERGENCY MEDICINE

## 2021-01-01 PROCEDURE — 74011000302 HC RX REV CODE- 302: Performed by: HOSPITALIST

## 2021-01-01 PROCEDURE — 76775 US EXAM ABDO BACK WALL LIM: CPT

## 2021-01-01 PROCEDURE — 99232 SBSQ HOSP IP/OBS MODERATE 35: CPT | Performed by: INTERNAL MEDICINE

## 2021-01-01 PROCEDURE — 97166 OT EVAL MOD COMPLEX 45 MIN: CPT

## 2021-01-01 PROCEDURE — 92610 EVALUATE SWALLOWING FUNCTION: CPT

## 2021-01-01 PROCEDURE — 99152 MOD SED SAME PHYS/QHP 5/>YRS: CPT

## 2021-01-01 PROCEDURE — 86334 IMMUNOFIX E-PHORESIS SERUM: CPT

## 2021-01-01 PROCEDURE — 88305 TISSUE EXAM BY PATHOLOGIST: CPT

## 2021-01-01 PROCEDURE — 88237 TISSUE CULTURE BONE MARROW: CPT

## 2021-01-01 PROCEDURE — 74011250636 HC RX REV CODE- 250/636: Performed by: RADIOLOGY

## 2021-01-01 PROCEDURE — 99222 1ST HOSP IP/OBS MODERATE 55: CPT | Performed by: PSYCHIATRY & NEUROLOGY

## 2021-01-01 PROCEDURE — APPSS60 APP SPLIT SHARED TIME 46-60 MINUTES: Performed by: NURSE PRACTITIONER

## 2021-01-01 PROCEDURE — 74011000250 HC RX REV CODE- 250: Performed by: RADIOLOGY

## 2021-01-01 PROCEDURE — 93005 ELECTROCARDIOGRAM TRACING: CPT | Performed by: EMERGENCY MEDICINE

## 2021-01-01 PROCEDURE — 74011250636 HC RX REV CODE- 250/636: Performed by: NURSE PRACTITIONER

## 2021-01-01 PROCEDURE — 84443 ASSAY THYROID STIM HORMONE: CPT

## 2021-01-01 PROCEDURE — 87088 URINE BACTERIA CULTURE: CPT

## 2021-01-01 RX ORDER — ALBUTEROL SULFATE 0.83 MG/ML
2.5 SOLUTION RESPIRATORY (INHALATION) AS NEEDED
Status: CANCELLED
Start: 2021-01-01

## 2021-01-01 RX ORDER — SODIUM CHLORIDE 9 MG/ML
10 INJECTION INTRAMUSCULAR; INTRAVENOUS; SUBCUTANEOUS AS NEEDED
Status: CANCELLED | OUTPATIENT
Start: 2021-06-13

## 2021-01-01 RX ORDER — DILTIAZEM HYDROCHLORIDE 120 MG/1
240 CAPSULE, COATED, EXTENDED RELEASE ORAL DAILY
Status: CANCELLED | OUTPATIENT
Start: 2021-01-01

## 2021-01-01 RX ORDER — HALOPERIDOL 1 MG/1
2 TABLET ORAL EVERY 12 HOURS
Status: DISCONTINUED | OUTPATIENT
Start: 2021-01-01 | End: 2021-01-01

## 2021-01-01 RX ORDER — METAXALONE 800 MG/1
400-800 TABLET ORAL 4 TIMES DAILY
Qty: 20 TAB | Refills: 0 | Status: SHIPPED | OUTPATIENT
Start: 2021-01-01 | End: 2021-01-01

## 2021-01-01 RX ORDER — DIPHENHYDRAMINE HYDROCHLORIDE 50 MG/ML
25 INJECTION, SOLUTION INTRAMUSCULAR; INTRAVENOUS AS NEEDED
Status: CANCELLED
Start: 2021-06-13

## 2021-01-01 RX ORDER — HEPARIN 100 UNIT/ML
300-500 SYRINGE INTRAVENOUS AS NEEDED
Status: CANCELLED
Start: 2021-06-20

## 2021-01-01 RX ORDER — HEPARIN 100 UNIT/ML
300-500 SYRINGE INTRAVENOUS AS NEEDED
Status: CANCELLED
Start: 2021-06-13

## 2021-01-01 RX ORDER — ONDANSETRON 2 MG/ML
8 INJECTION INTRAMUSCULAR; INTRAVENOUS AS NEEDED
Status: CANCELLED | OUTPATIENT
Start: 2021-01-01

## 2021-01-01 RX ORDER — IPRATROPIUM BROMIDE 0.5 MG/2.5ML
0.5 SOLUTION RESPIRATORY (INHALATION)
Status: DISCONTINUED | OUTPATIENT
Start: 2021-01-01 | End: 2021-06-12 | Stop reason: HOSPADM

## 2021-01-01 RX ORDER — MORPHINE SULFATE 100 MG/5ML
10 SOLUTION ORAL
Status: DISCONTINUED | OUTPATIENT
Start: 2021-01-01 | End: 2021-06-12 | Stop reason: HOSPADM

## 2021-01-01 RX ORDER — DIPHENHYDRAMINE HCL 25 MG
12.5 CAPSULE ORAL ONCE
Status: CANCELLED | OUTPATIENT
Start: 2021-01-01 | End: 2021-01-01

## 2021-01-01 RX ORDER — LORAZEPAM 1 MG/1
1 TABLET ORAL
Status: DISCONTINUED | OUTPATIENT
Start: 2021-01-01 | End: 2021-01-01 | Stop reason: HOSPADM

## 2021-01-01 RX ORDER — FACIAL-BODY WIPES
10 EACH TOPICAL DAILY PRN
Status: DISCONTINUED | OUTPATIENT
Start: 2021-01-01 | End: 2021-01-01 | Stop reason: HOSPADM

## 2021-01-01 RX ORDER — HALOPERIDOL 2 MG/1
2 TABLET ORAL
Status: DISCONTINUED | OUTPATIENT
Start: 2021-01-01 | End: 2021-01-01 | Stop reason: HOSPADM

## 2021-01-01 RX ORDER — POTASSIUM CHLORIDE 20 MEQ/1
40 TABLET, EXTENDED RELEASE ORAL 2 TIMES DAILY
Status: DISCONTINUED | OUTPATIENT
Start: 2021-01-01 | End: 2021-01-01 | Stop reason: HOSPADM

## 2021-01-01 RX ORDER — DIPHENHYDRAMINE HYDROCHLORIDE 50 MG/ML
25 INJECTION, SOLUTION INTRAMUSCULAR; INTRAVENOUS
Status: CANCELLED | OUTPATIENT
Start: 2021-01-01

## 2021-01-01 RX ORDER — MORPHINE SULFATE 2 MG/ML
2 INJECTION, SOLUTION INTRAMUSCULAR; INTRAVENOUS
Status: DISCONTINUED | OUTPATIENT
Start: 2021-01-01 | End: 2021-01-01

## 2021-01-01 RX ORDER — HEPARIN 100 UNIT/ML
300-500 SYRINGE INTRAVENOUS AS NEEDED
Status: CANCELLED
Start: 2021-01-01

## 2021-01-01 RX ORDER — HALOPERIDOL 5 MG/ML
2 INJECTION INTRAMUSCULAR
Status: DISCONTINUED | OUTPATIENT
Start: 2021-01-01 | End: 2021-01-01

## 2021-01-01 RX ORDER — ADHESIVE BANDAGE
30 BANDAGE TOPICAL DAILY PRN
Status: DISCONTINUED | OUTPATIENT
Start: 2021-01-01 | End: 2021-01-01 | Stop reason: HOSPADM

## 2021-01-01 RX ORDER — LORAZEPAM 1 MG/1
1 TABLET ORAL
Status: DISCONTINUED | OUTPATIENT
Start: 2021-01-01 | End: 2021-06-12 | Stop reason: HOSPADM

## 2021-01-01 RX ORDER — SODIUM CHLORIDE 9 MG/ML
10 INJECTION INTRAMUSCULAR; INTRAVENOUS; SUBCUTANEOUS AS NEEDED
Status: CANCELLED | OUTPATIENT
Start: 2021-06-20

## 2021-01-01 RX ORDER — MAGNESIUM SULFATE HEPTAHYDRATE 40 MG/ML
2 INJECTION, SOLUTION INTRAVENOUS ONCE
Status: COMPLETED | OUTPATIENT
Start: 2021-01-01 | End: 2021-01-01

## 2021-01-01 RX ORDER — ACETAMINOPHEN 650 MG/1
650 SUPPOSITORY RECTAL
Status: DISCONTINUED | OUTPATIENT
Start: 2021-01-01 | End: 2021-01-01

## 2021-01-01 RX ORDER — TRAMADOL HYDROCHLORIDE 50 MG/1
50 TABLET ORAL
COMMUNITY

## 2021-01-01 RX ORDER — SODIUM CHLORIDE 0.9 % (FLUSH) 0.9 %
3 SYRINGE (ML) INJECTION EVERY 12 HOURS
Status: DISCONTINUED | OUTPATIENT
Start: 2021-01-01 | End: 2021-01-01

## 2021-01-01 RX ORDER — DIPHENHYDRAMINE HCL 25 MG
25 CAPSULE ORAL
Status: CANCELLED | OUTPATIENT
Start: 2021-01-01

## 2021-01-01 RX ORDER — IBUPROFEN 600 MG/1
600 TABLET ORAL
Status: COMPLETED | OUTPATIENT
Start: 2021-01-01 | End: 2021-01-01

## 2021-01-01 RX ORDER — POTASSIUM CHLORIDE 20 MEQ/1
20 TABLET, EXTENDED RELEASE ORAL DAILY
Status: DISCONTINUED | OUTPATIENT
Start: 2021-01-01 | End: 2021-01-01

## 2021-01-01 RX ORDER — ALBUTEROL SULFATE 0.83 MG/ML
2.5 SOLUTION RESPIRATORY (INHALATION) AS NEEDED
Status: CANCELLED
Start: 2021-06-13

## 2021-01-01 RX ORDER — TRAMADOL HYDROCHLORIDE 50 MG/1
50-100 TABLET ORAL
Qty: 20 TAB | Refills: 0 | Status: SHIPPED | OUTPATIENT
Start: 2021-01-01 | End: 2021-01-01

## 2021-01-01 RX ORDER — PRASUGREL 10 MG/1
10 TABLET, FILM COATED ORAL DAILY
Status: DISCONTINUED | OUTPATIENT
Start: 2021-01-01 | End: 2021-01-01 | Stop reason: HOSPADM

## 2021-01-01 RX ORDER — HALOPERIDOL 1 MG/1
2 TABLET ORAL
Status: DISCONTINUED | OUTPATIENT
Start: 2021-01-01 | End: 2021-01-01

## 2021-01-01 RX ORDER — ESCITALOPRAM OXALATE 10 MG/1
10 TABLET ORAL DAILY
Status: CANCELLED | OUTPATIENT
Start: 2021-01-01

## 2021-01-01 RX ORDER — SODIUM CHLORIDE 0.9 % (FLUSH) 0.9 %
10 SYRINGE (ML) INJECTION AS NEEDED
Status: CANCELLED | OUTPATIENT
Start: 2021-01-01

## 2021-01-01 RX ORDER — MORPHINE SULFATE 2 MG/ML
2 INJECTION, SOLUTION INTRAMUSCULAR; INTRAVENOUS EVERY 6 HOURS
Status: DISCONTINUED | OUTPATIENT
Start: 2021-01-01 | End: 2021-01-01

## 2021-01-01 RX ORDER — SODIUM CHLORIDE, SODIUM LACTATE, POTASSIUM CHLORIDE, CALCIUM CHLORIDE 600; 310; 30; 20 MG/100ML; MG/100ML; MG/100ML; MG/100ML
75 INJECTION, SOLUTION INTRAVENOUS CONTINUOUS
Status: DISCONTINUED | OUTPATIENT
Start: 2021-01-01 | End: 2021-01-01 | Stop reason: HOSPADM

## 2021-01-01 RX ORDER — HALOPERIDOL 5 MG/ML
2 INJECTION INTRAMUSCULAR EVERY 12 HOURS
Status: DISCONTINUED | OUTPATIENT
Start: 2021-01-01 | End: 2021-01-01

## 2021-01-01 RX ORDER — ENOXAPARIN SODIUM 100 MG/ML
40 INJECTION SUBCUTANEOUS DAILY
Status: DISCONTINUED | OUTPATIENT
Start: 2021-01-01 | End: 2021-01-01 | Stop reason: HOSPADM

## 2021-01-01 RX ORDER — ONDANSETRON 2 MG/ML
4 INJECTION INTRAMUSCULAR; INTRAVENOUS
Status: DISCONTINUED | OUTPATIENT
Start: 2021-01-01 | End: 2021-01-01 | Stop reason: HOSPADM

## 2021-01-01 RX ORDER — SODIUM CHLORIDE 0.9 % (FLUSH) 0.9 %
10 SYRINGE (ML) INJECTION
Status: COMPLETED | OUTPATIENT
Start: 2021-01-01 | End: 2021-01-01

## 2021-01-01 RX ORDER — TEMAZEPAM 15 MG/1
15 CAPSULE ORAL
Status: DISCONTINUED | OUTPATIENT
Start: 2021-01-01 | End: 2021-06-12 | Stop reason: HOSPADM

## 2021-01-01 RX ORDER — HALOPERIDOL 5 MG/ML
2 INJECTION INTRAMUSCULAR
Status: DISCONTINUED | OUTPATIENT
Start: 2021-01-01 | End: 2021-01-01 | Stop reason: HOSPADM

## 2021-01-01 RX ORDER — EPINEPHRINE 1 MG/ML
0.3 INJECTION, SOLUTION, CONCENTRATE INTRAVENOUS AS NEEDED
Status: CANCELLED | OUTPATIENT
Start: 2021-01-01

## 2021-01-01 RX ORDER — DIPHENHYDRAMINE HYDROCHLORIDE 50 MG/ML
25 INJECTION, SOLUTION INTRAMUSCULAR; INTRAVENOUS AS NEEDED
Status: CANCELLED
Start: 2021-01-01

## 2021-01-01 RX ORDER — CHOLECALCIFEROL (VITAMIN D3) 125 MCG
5 CAPSULE ORAL
Status: CANCELLED | OUTPATIENT
Start: 2021-01-01

## 2021-01-01 RX ORDER — LANOLIN ALCOHOL/MO/W.PET/CERES
100 CREAM (GRAM) TOPICAL DAILY
Status: DISCONTINUED | OUTPATIENT
Start: 2021-01-01 | End: 2021-01-01 | Stop reason: HOSPADM

## 2021-01-01 RX ORDER — POTASSIUM CHLORIDE 20 MEQ/1
40 TABLET, EXTENDED RELEASE ORAL 2 TIMES DAILY
Status: COMPLETED | OUTPATIENT
Start: 2021-01-01 | End: 2021-01-01

## 2021-01-01 RX ORDER — EPINEPHRINE 1 MG/ML
0.3 INJECTION, SOLUTION, CONCENTRATE INTRAVENOUS AS NEEDED
Status: CANCELLED | OUTPATIENT
Start: 2021-06-13

## 2021-01-01 RX ORDER — POLYETHYLENE GLYCOL 3350 17 G/17G
17 POWDER, FOR SOLUTION ORAL DAILY
Status: DISCONTINUED | OUTPATIENT
Start: 2021-01-01 | End: 2021-01-01 | Stop reason: HOSPADM

## 2021-01-01 RX ORDER — ONDANSETRON 2 MG/ML
8 INJECTION INTRAMUSCULAR; INTRAVENOUS AS NEEDED
Status: CANCELLED | OUTPATIENT
Start: 2021-06-20

## 2021-01-01 RX ORDER — FAMOTIDINE 20 MG/1
20 TABLET, FILM COATED ORAL DAILY
Status: CANCELLED | OUTPATIENT
Start: 2021-01-01

## 2021-01-01 RX ORDER — AMOXICILLIN 250 MG
1 CAPSULE ORAL 2 TIMES DAILY
Status: DISCONTINUED | OUTPATIENT
Start: 2021-01-01 | End: 2021-01-01 | Stop reason: HOSPADM

## 2021-01-01 RX ORDER — GUAIFENESIN 100 MG/5ML
81 LIQUID (ML) ORAL DAILY
Status: DISCONTINUED | OUTPATIENT
Start: 2021-01-01 | End: 2021-01-01 | Stop reason: HOSPADM

## 2021-01-01 RX ORDER — POTASSIUM CHLORIDE 20 MEQ/1
40 TABLET, EXTENDED RELEASE ORAL 2 TIMES DAILY
Status: CANCELLED | OUTPATIENT
Start: 2021-01-01

## 2021-01-01 RX ORDER — FACIAL-BODY WIPES
10 EACH TOPICAL DAILY PRN
Status: CANCELLED | OUTPATIENT
Start: 2021-01-01

## 2021-01-01 RX ORDER — PRAVASTATIN SODIUM 20 MG/1
40 TABLET ORAL EVERY 24 HOURS
Status: DISCONTINUED | OUTPATIENT
Start: 2021-01-01 | End: 2021-01-01 | Stop reason: HOSPADM

## 2021-01-01 RX ORDER — SODIUM CHLORIDE 0.9 % (FLUSH) 0.9 %
5-40 SYRINGE (ML) INJECTION AS NEEDED
Status: DISCONTINUED | OUTPATIENT
Start: 2021-01-01 | End: 2021-01-01 | Stop reason: HOSPADM

## 2021-01-01 RX ORDER — SODIUM CHLORIDE 9 MG/ML
25 INJECTION, SOLUTION INTRAVENOUS CONTINUOUS
Status: CANCELLED | OUTPATIENT
Start: 2021-01-01

## 2021-01-01 RX ORDER — SODIUM CHLORIDE 0.9 % (FLUSH) 0.9 %
10 SYRINGE (ML) INJECTION AS NEEDED
Status: CANCELLED | OUTPATIENT
Start: 2021-06-13

## 2021-01-01 RX ORDER — HYDRALAZINE HYDROCHLORIDE 10 MG/1
10 TABLET, FILM COATED ORAL 3 TIMES DAILY
Status: CANCELLED | OUTPATIENT
Start: 2021-01-01

## 2021-01-01 RX ORDER — SENNOSIDES 8.6 MG/1
1 TABLET ORAL 2 TIMES DAILY
Status: DISCONTINUED | OUTPATIENT
Start: 2021-01-01 | End: 2021-01-01

## 2021-01-01 RX ORDER — ACETAMINOPHEN 325 MG/1
650 TABLET ORAL
Status: DISCONTINUED | OUTPATIENT
Start: 2021-01-01 | End: 2021-01-01 | Stop reason: HOSPADM

## 2021-01-01 RX ORDER — DILTIAZEM HYDROCHLORIDE 120 MG/1
240 CAPSULE, COATED, EXTENDED RELEASE ORAL DAILY
Status: DISCONTINUED | OUTPATIENT
Start: 2021-01-01 | End: 2021-01-01 | Stop reason: HOSPADM

## 2021-01-01 RX ORDER — DIPHENHYDRAMINE HYDROCHLORIDE 50 MG/ML
50 INJECTION, SOLUTION INTRAMUSCULAR; INTRAVENOUS AS NEEDED
Status: CANCELLED
Start: 2021-06-13

## 2021-01-01 RX ORDER — LANOLIN ALCOHOL/MO/W.PET/CERES
400 CREAM (GRAM) TOPICAL DAILY
Status: DISCONTINUED | OUTPATIENT
Start: 2021-01-01 | End: 2021-01-01 | Stop reason: HOSPADM

## 2021-01-01 RX ORDER — OLANZAPINE 2.5 MG/1
2.5 TABLET ORAL EVERY EVENING
Status: DISCONTINUED | OUTPATIENT
Start: 2021-01-01 | End: 2021-01-01 | Stop reason: HOSPADM

## 2021-01-01 RX ORDER — CALCITONIN SALMON 200 [USP'U]/ML
4 INJECTION, SOLUTION INTRAMUSCULAR; SUBCUTANEOUS EVERY 12 HOURS
Status: DISCONTINUED | OUTPATIENT
Start: 2021-01-01 | End: 2021-01-01

## 2021-01-01 RX ORDER — MAG HYDROX/ALUMINUM HYD/SIMETH 200-200-20
30 SUSPENSION, ORAL (FINAL DOSE FORM) ORAL
Status: DISCONTINUED | OUTPATIENT
Start: 2021-01-01 | End: 2021-06-12 | Stop reason: HOSPADM

## 2021-01-01 RX ORDER — HYDRALAZINE HYDROCHLORIDE 10 MG/1
10 TABLET, FILM COATED ORAL 3 TIMES DAILY
Status: DISCONTINUED | OUTPATIENT
Start: 2021-01-01 | End: 2021-01-01 | Stop reason: HOSPADM

## 2021-01-01 RX ORDER — ADHESIVE BANDAGE
30 BANDAGE TOPICAL DAILY PRN
Status: CANCELLED | OUTPATIENT
Start: 2021-01-01

## 2021-01-01 RX ORDER — CHOLECALCIFEROL (VITAMIN D3) 125 MCG
5 CAPSULE ORAL
Status: DISCONTINUED | OUTPATIENT
Start: 2021-01-01 | End: 2021-01-01 | Stop reason: HOSPADM

## 2021-01-01 RX ORDER — SODIUM CHLORIDE 0.9 % (FLUSH) 0.9 %
5-40 SYRINGE (ML) INJECTION EVERY 8 HOURS
Status: DISCONTINUED | OUTPATIENT
Start: 2021-01-01 | End: 2021-01-01 | Stop reason: HOSPADM

## 2021-01-01 RX ORDER — DIPHENHYDRAMINE HYDROCHLORIDE 50 MG/ML
50 INJECTION, SOLUTION INTRAMUSCULAR; INTRAVENOUS AS NEEDED
Status: CANCELLED
Start: 2021-06-20

## 2021-01-01 RX ORDER — SODIUM CHLORIDE, SODIUM LACTATE, POTASSIUM CHLORIDE, CALCIUM CHLORIDE 600; 310; 30; 20 MG/100ML; MG/100ML; MG/100ML; MG/100ML
125 INJECTION, SOLUTION INTRAVENOUS CONTINUOUS
Status: DISPENSED | OUTPATIENT
Start: 2021-01-01 | End: 2021-01-01

## 2021-01-01 RX ORDER — DIPHENHYDRAMINE HYDROCHLORIDE 50 MG/ML
50 INJECTION, SOLUTION INTRAMUSCULAR; INTRAVENOUS AS NEEDED
Status: CANCELLED
Start: 2021-01-01

## 2021-01-01 RX ORDER — ACETAMINOPHEN 325 MG/1
650 TABLET ORAL
Status: DISCONTINUED | OUTPATIENT
Start: 2021-01-01 | End: 2021-01-01

## 2021-01-01 RX ORDER — FENTANYL CITRATE 50 UG/ML
25-100 INJECTION, SOLUTION INTRAMUSCULAR; INTRAVENOUS
Status: DISCONTINUED | OUTPATIENT
Start: 2021-01-01 | End: 2021-01-01

## 2021-01-01 RX ORDER — SODIUM CHLORIDE 9 MG/ML
10 INJECTION INTRAMUSCULAR; INTRAVENOUS; SUBCUTANEOUS AS NEEDED
Status: CANCELLED | OUTPATIENT
Start: 2021-01-01

## 2021-01-01 RX ORDER — SODIUM CHLORIDE 0.9 % (FLUSH) 0.9 %
5-40 SYRINGE (ML) INJECTION AS NEEDED
Status: CANCELLED | OUTPATIENT
Start: 2021-01-01

## 2021-01-01 RX ORDER — ONDANSETRON 2 MG/ML
4 INJECTION INTRAMUSCULAR; INTRAVENOUS
Status: CANCELLED | OUTPATIENT
Start: 2021-01-01

## 2021-01-01 RX ORDER — ENOXAPARIN SODIUM 100 MG/ML
40 INJECTION SUBCUTANEOUS EVERY 24 HOURS
Status: DISCONTINUED | OUTPATIENT
Start: 2021-01-01 | End: 2021-01-01

## 2021-01-01 RX ORDER — ACETAMINOPHEN 325 MG/1
650 TABLET ORAL
Status: DISCONTINUED | OUTPATIENT
Start: 2021-01-01 | End: 2021-06-12 | Stop reason: HOSPADM

## 2021-01-01 RX ORDER — ONDANSETRON 4 MG/1
4 TABLET, ORALLY DISINTEGRATING ORAL
Status: CANCELLED | OUTPATIENT
Start: 2021-01-01

## 2021-01-01 RX ORDER — HYDROCORTISONE SODIUM SUCCINATE 100 MG/2ML
100 INJECTION, POWDER, FOR SOLUTION INTRAMUSCULAR; INTRAVENOUS AS NEEDED
Status: CANCELLED | OUTPATIENT
Start: 2021-06-20

## 2021-01-01 RX ORDER — SODIUM CHLORIDE 0.9 % (FLUSH) 0.9 %
3 SYRINGE (ML) INJECTION AS NEEDED
Status: DISCONTINUED | OUTPATIENT
Start: 2021-01-01 | End: 2021-01-01

## 2021-01-01 RX ORDER — HYDROCORTISONE SODIUM SUCCINATE 100 MG/2ML
100 INJECTION, POWDER, FOR SOLUTION INTRAMUSCULAR; INTRAVENOUS AS NEEDED
Status: CANCELLED | OUTPATIENT
Start: 2021-01-01

## 2021-01-01 RX ORDER — ENOXAPARIN SODIUM 100 MG/ML
40 INJECTION SUBCUTANEOUS DAILY
Status: DISCONTINUED | OUTPATIENT
Start: 2021-01-01 | End: 2021-01-01

## 2021-01-01 RX ORDER — SODIUM CHLORIDE 9 MG/ML
25 INJECTION, SOLUTION INTRAVENOUS CONTINUOUS
Status: CANCELLED | OUTPATIENT
Start: 2021-06-20

## 2021-01-01 RX ORDER — FENTANYL 50 UG/1
1 PATCH TRANSDERMAL
Status: DISCONTINUED | OUTPATIENT
Start: 2021-01-01 | End: 2021-06-12 | Stop reason: HOSPADM

## 2021-01-01 RX ORDER — SODIUM CHLORIDE 0.9 % (FLUSH) 0.9 %
5-40 SYRINGE (ML) INJECTION EVERY 8 HOURS
Status: CANCELLED | OUTPATIENT
Start: 2021-01-01

## 2021-01-01 RX ORDER — ESCITALOPRAM OXALATE 10 MG/1
10 TABLET ORAL DAILY
Status: DISCONTINUED | OUTPATIENT
Start: 2021-01-01 | End: 2021-01-01 | Stop reason: HOSPADM

## 2021-01-01 RX ORDER — DIPHENHYDRAMINE HYDROCHLORIDE 50 MG/ML
25 INJECTION, SOLUTION INTRAMUSCULAR; INTRAVENOUS
Status: DISCONTINUED | OUTPATIENT
Start: 2021-01-01 | End: 2021-06-12 | Stop reason: HOSPADM

## 2021-01-01 RX ORDER — CALCITONIN SALMON 200 [USP'U]/ML
300 INJECTION, SOLUTION INTRAMUSCULAR; SUBCUTANEOUS EVERY 12 HOURS
Status: COMPLETED | OUTPATIENT
Start: 2021-01-01 | End: 2021-01-01

## 2021-01-01 RX ORDER — POTASSIUM CHLORIDE 14.9 MG/ML
20 INJECTION INTRAVENOUS
Status: COMPLETED | OUTPATIENT
Start: 2021-01-01 | End: 2021-01-01

## 2021-01-01 RX ORDER — ONDANSETRON 2 MG/ML
8 INJECTION INTRAMUSCULAR; INTRAVENOUS ONCE
Status: CANCELLED | OUTPATIENT
Start: 2021-06-20 | End: 2021-06-20

## 2021-01-01 RX ORDER — SODIUM CHLORIDE 9 MG/ML
25 INJECTION, SOLUTION INTRAVENOUS CONTINUOUS
Status: CANCELLED | OUTPATIENT
Start: 2021-06-13

## 2021-01-01 RX ORDER — HYDROCORTISONE SODIUM SUCCINATE 100 MG/2ML
100 INJECTION, POWDER, FOR SOLUTION INTRAMUSCULAR; INTRAVENOUS AS NEEDED
Status: CANCELLED | OUTPATIENT
Start: 2021-06-13

## 2021-01-01 RX ORDER — LANOLIN ALCOHOL/MO/W.PET/CERES
400 CREAM (GRAM) TOPICAL DAILY
Status: CANCELLED | OUTPATIENT
Start: 2021-01-01

## 2021-01-01 RX ORDER — ACETAMINOPHEN 650 MG/1
650 SUPPOSITORY RECTAL
Status: CANCELLED | OUTPATIENT
Start: 2021-01-01

## 2021-01-01 RX ORDER — FOLIC ACID 1 MG/1
1 TABLET ORAL DAILY
Status: DISCONTINUED | OUTPATIENT
Start: 2021-01-01 | End: 2021-01-01 | Stop reason: HOSPADM

## 2021-01-01 RX ORDER — ACETAMINOPHEN 325 MG/1
650 TABLET ORAL AS NEEDED
Status: CANCELLED
Start: 2021-01-01

## 2021-01-01 RX ORDER — ONDANSETRON 2 MG/ML
8 INJECTION INTRAMUSCULAR; INTRAVENOUS ONCE
Status: CANCELLED | OUTPATIENT
Start: 2021-01-01 | End: 2021-01-01

## 2021-01-01 RX ORDER — LORAZEPAM 1 MG/1
1 TABLET ORAL
Qty: 30 TABLET | Refills: 0 | Status: SHIPPED
Start: 2021-01-01

## 2021-01-01 RX ORDER — LIDOCAINE HYDROCHLORIDE 20 MG/ML
20-200 INJECTION, SOLUTION INFILTRATION; PERINEURAL
Status: DISCONTINUED | OUTPATIENT
Start: 2021-01-01 | End: 2021-01-01

## 2021-01-01 RX ORDER — ENOXAPARIN SODIUM 100 MG/ML
30 INJECTION SUBCUTANEOUS EVERY 24 HOURS
Status: DISCONTINUED | OUTPATIENT
Start: 2021-01-01 | End: 2021-01-01 | Stop reason: HOSPADM

## 2021-01-01 RX ORDER — ACETAMINOPHEN 325 MG/1
650 TABLET ORAL AS NEEDED
Status: CANCELLED
Start: 2021-06-20

## 2021-01-01 RX ORDER — POTASSIUM CHLORIDE 20 MEQ/1
20 TABLET, EXTENDED RELEASE ORAL
Status: COMPLETED | OUTPATIENT
Start: 2021-01-01 | End: 2021-01-01

## 2021-01-01 RX ORDER — ONDANSETRON 4 MG/1
4 TABLET, ORALLY DISINTEGRATING ORAL
Status: DISCONTINUED | OUTPATIENT
Start: 2021-01-01 | End: 2021-01-01 | Stop reason: HOSPADM

## 2021-01-01 RX ORDER — ONDANSETRON 2 MG/ML
8 INJECTION INTRAMUSCULAR; INTRAVENOUS ONCE
Status: CANCELLED | OUTPATIENT
Start: 2021-06-13 | End: 2021-06-13

## 2021-01-01 RX ORDER — OLANZAPINE 5 MG/1
5 TABLET ORAL EVERY EVENING
Status: DISCONTINUED | OUTPATIENT
Start: 2021-01-01 | End: 2021-01-01

## 2021-01-01 RX ORDER — PREDNISONE 10 MG/1
40 TABLET ORAL
Status: COMPLETED | OUTPATIENT
Start: 2021-01-01 | End: 2021-01-01

## 2021-01-01 RX ORDER — LORAZEPAM 0.5 MG/1
0.5 TABLET ORAL ONCE
Status: COMPLETED | OUTPATIENT
Start: 2021-01-01 | End: 2021-01-01

## 2021-01-01 RX ORDER — ALLOPURINOL 300 MG/1
300 TABLET ORAL 2 TIMES DAILY
Status: DISCONTINUED | OUTPATIENT
Start: 2021-01-01 | End: 2021-01-01 | Stop reason: HOSPADM

## 2021-01-01 RX ORDER — METFORMIN HYDROCHLORIDE 500 MG/1
1000 TABLET ORAL 2 TIMES DAILY WITH MEALS
Qty: 120 TAB | Refills: 1 | Status: SHIPPED | OUTPATIENT
Start: 2021-01-01 | End: 2021-01-01

## 2021-01-01 RX ORDER — GLYCOPYRROLATE 0.2 MG/ML
0.2 INJECTION INTRAMUSCULAR; INTRAVENOUS
Status: DISCONTINUED | OUTPATIENT
Start: 2021-01-01 | End: 2021-06-12 | Stop reason: HOSPADM

## 2021-01-01 RX ORDER — PRASUGREL 10 MG/1
10 TABLET, FILM COATED ORAL DAILY
Status: CANCELLED | OUTPATIENT
Start: 2021-01-01

## 2021-01-01 RX ORDER — PRAVASTATIN SODIUM 20 MG/1
40 TABLET ORAL EVERY 24 HOURS
Status: CANCELLED | OUTPATIENT
Start: 2021-01-01

## 2021-01-01 RX ORDER — ONDANSETRON 2 MG/ML
8 INJECTION INTRAMUSCULAR; INTRAVENOUS AS NEEDED
Status: CANCELLED | OUTPATIENT
Start: 2021-06-13

## 2021-01-01 RX ORDER — ACETAMINOPHEN 325 MG/1
650 TABLET ORAL AS NEEDED
Status: CANCELLED
Start: 2021-06-13

## 2021-01-01 RX ORDER — SODIUM CHLORIDE 0.9 % (FLUSH) 0.9 %
10 SYRINGE (ML) INJECTION AS NEEDED
Status: CANCELLED | OUTPATIENT
Start: 2021-06-20

## 2021-01-01 RX ORDER — EPINEPHRINE 1 MG/ML
0.3 INJECTION, SOLUTION, CONCENTRATE INTRAVENOUS AS NEEDED
Status: CANCELLED | OUTPATIENT
Start: 2021-06-20

## 2021-01-01 RX ORDER — PREDNISONE 20 MG/1
20 TABLET ORAL DAILY
Qty: 4 TAB | Refills: 0 | Status: SHIPPED | OUTPATIENT
Start: 2021-01-01 | End: 2021-01-01

## 2021-01-01 RX ORDER — DIPHENHYDRAMINE HYDROCHLORIDE 50 MG/ML
25 INJECTION, SOLUTION INTRAMUSCULAR; INTRAVENOUS AS NEEDED
Status: CANCELLED
Start: 2021-06-20

## 2021-01-01 RX ORDER — TRAMADOL HYDROCHLORIDE 50 MG/1
100 TABLET ORAL
Status: COMPLETED | OUTPATIENT
Start: 2021-01-01 | End: 2021-01-01

## 2021-01-01 RX ORDER — ZOLPIDEM TARTRATE 5 MG/1
5 TABLET ORAL ONCE
Status: COMPLETED | OUTPATIENT
Start: 2021-01-01 | End: 2021-01-01

## 2021-01-01 RX ORDER — HYDRALAZINE HYDROCHLORIDE 20 MG/ML
20 INJECTION INTRAMUSCULAR; INTRAVENOUS
Status: DISCONTINUED | OUTPATIENT
Start: 2021-01-01 | End: 2021-01-01 | Stop reason: HOSPADM

## 2021-01-01 RX ORDER — MIDAZOLAM HYDROCHLORIDE 1 MG/ML
.5-2 INJECTION, SOLUTION INTRAMUSCULAR; INTRAVENOUS
Status: DISCONTINUED | OUTPATIENT
Start: 2021-01-01 | End: 2021-01-01

## 2021-01-01 RX ORDER — CYCLOBENZAPRINE HCL 10 MG
5 TABLET ORAL
Status: COMPLETED | OUTPATIENT
Start: 2021-01-01 | End: 2021-01-01

## 2021-01-01 RX ORDER — FAMOTIDINE 20 MG/1
20 TABLET, FILM COATED ORAL DAILY
Status: DISCONTINUED | OUTPATIENT
Start: 2021-01-01 | End: 2021-01-01 | Stop reason: HOSPADM

## 2021-01-01 RX ORDER — POTASSIUM CHLORIDE 20 MEQ/1
40 TABLET, EXTENDED RELEASE ORAL 2 TIMES DAILY
Status: DISPENSED | OUTPATIENT
Start: 2021-01-01 | End: 2021-01-01

## 2021-01-01 RX ORDER — OLANZAPINE 5 MG/1
5 TABLET ORAL EVERY EVENING
Qty: 30 TABLET | Refills: 0 | Status: SHIPPED
Start: 2021-01-01

## 2021-01-01 RX ORDER — GUAIFENESIN 100 MG/5ML
81 LIQUID (ML) ORAL DAILY
Status: CANCELLED | OUTPATIENT
Start: 2021-01-01

## 2021-01-01 RX ORDER — FENTANYL 25 UG/1
1 PATCH TRANSDERMAL
Status: DISCONTINUED | OUTPATIENT
Start: 2021-01-01 | End: 2021-01-01

## 2021-01-01 RX ORDER — HYDRALAZINE HYDROCHLORIDE 20 MG/ML
20 INJECTION INTRAMUSCULAR; INTRAVENOUS
Status: CANCELLED | OUTPATIENT
Start: 2021-01-01

## 2021-01-01 RX ORDER — SODIUM CHLORIDE 9 MG/ML
25 INJECTION, SOLUTION INTRAVENOUS ONCE
Status: COMPLETED | OUTPATIENT
Start: 2021-01-01 | End: 2021-01-01

## 2021-01-01 RX ORDER — ALBUTEROL SULFATE 0.83 MG/ML
2.5 SOLUTION RESPIRATORY (INHALATION) AS NEEDED
Status: CANCELLED
Start: 2021-06-20

## 2021-01-01 RX ORDER — OLANZAPINE 2.5 MG/1
5 TABLET ORAL EVERY EVENING
Status: CANCELLED | OUTPATIENT
Start: 2021-01-01

## 2021-01-01 RX ORDER — ACETAMINOPHEN 325 MG/1
650 TABLET ORAL
Status: CANCELLED | OUTPATIENT
Start: 2021-01-01

## 2021-01-01 RX ORDER — SODIUM CHLORIDE, SODIUM LACTATE, POTASSIUM CHLORIDE, CALCIUM CHLORIDE 600; 310; 30; 20 MG/100ML; MG/100ML; MG/100ML; MG/100ML
75 INJECTION, SOLUTION INTRAVENOUS CONTINUOUS
Status: CANCELLED | OUTPATIENT
Start: 2021-01-01

## 2021-01-01 RX ORDER — ENOXAPARIN SODIUM 100 MG/ML
40 INJECTION SUBCUTANEOUS DAILY
Status: CANCELLED | OUTPATIENT
Start: 2021-01-01

## 2021-01-01 RX ORDER — ACETAMINOPHEN 650 MG/1
650 SUPPOSITORY RECTAL
Status: DISCONTINUED | OUTPATIENT
Start: 2021-01-01 | End: 2021-01-01 | Stop reason: HOSPADM

## 2021-01-01 RX ADMIN — Medication 10 ML: at 21:23

## 2021-01-01 RX ADMIN — Medication 10 ML: at 21:09

## 2021-01-01 RX ADMIN — SENNOSIDES AND DOCUSATE SODIUM 1 TABLET: 8.6; 5 TABLET ORAL at 21:53

## 2021-01-01 RX ADMIN — DIATRIZOATE MEGLUMINE AND DIATRIZOATE SODIUM 15 ML: 660; 100 LIQUID ORAL; RECTAL at 14:14

## 2021-01-01 RX ADMIN — SODIUM CHLORIDE, PRESERVATIVE FREE 3 ML: 5 INJECTION INTRAVENOUS at 09:52

## 2021-01-01 RX ADMIN — MORPHINE SULFATE 10 MG: 10 SOLUTION ORAL at 00:08

## 2021-01-01 RX ADMIN — ALLOPURINOL 300 MG: 300 TABLET ORAL at 18:33

## 2021-01-01 RX ADMIN — PRAVASTATIN SODIUM 40 MG: 20 TABLET ORAL at 21:29

## 2021-01-01 RX ADMIN — MORPHINE SULFATE 2 MG: 2 INJECTION, SOLUTION INTRAMUSCULAR; INTRAVENOUS at 04:36

## 2021-01-01 RX ADMIN — Medication 10 ML: at 14:15

## 2021-01-01 RX ADMIN — POTASSIUM CHLORIDE 40 MEQ: 20 TABLET, EXTENDED RELEASE ORAL at 08:09

## 2021-01-01 RX ADMIN — MAGNESIUM GLUCONATE 500 MG ORAL TABLET 400 MG: 500 TABLET ORAL at 09:13

## 2021-01-01 RX ADMIN — Medication 10 ML: at 05:56

## 2021-01-01 RX ADMIN — Medication 10 ML: at 05:15

## 2021-01-01 RX ADMIN — ESCITALOPRAM OXALATE 10 MG: 10 TABLET ORAL at 08:34

## 2021-01-01 RX ADMIN — FAMOTIDINE 20 MG: 20 TABLET, FILM COATED ORAL at 09:14

## 2021-01-01 RX ADMIN — SENNOSIDES AND DOCUSATE SODIUM 1 TABLET: 8.6; 5 TABLET ORAL at 20:26

## 2021-01-01 RX ADMIN — ENOXAPARIN SODIUM 40 MG: 40 INJECTION SUBCUTANEOUS at 10:46

## 2021-01-01 RX ADMIN — ASPIRIN 81 MG: 81 TABLET, CHEWABLE ORAL at 09:09

## 2021-01-01 RX ADMIN — ESCITALOPRAM OXALATE 10 MG: 10 TABLET ORAL at 08:18

## 2021-01-01 RX ADMIN — SODIUM CHLORIDE 100 ML: 900 INJECTION, SOLUTION INTRAVENOUS at 16:18

## 2021-01-01 RX ADMIN — MORPHINE SULFATE 10 MG: 10 SOLUTION ORAL at 02:27

## 2021-01-01 RX ADMIN — MORPHINE SULFATE 2 MG: 2 INJECTION, SOLUTION INTRAMUSCULAR; INTRAVENOUS at 03:57

## 2021-01-01 RX ADMIN — ENOXAPARIN SODIUM 30 MG: 40 INJECTION SUBCUTANEOUS at 07:58

## 2021-01-01 RX ADMIN — LORAZEPAM 1 MG: 1 TABLET ORAL at 12:09

## 2021-01-01 RX ADMIN — POTASSIUM CHLORIDE 40 MEQ: 20 TABLET, EXTENDED RELEASE ORAL at 10:46

## 2021-01-01 RX ADMIN — ACETAMINOPHEN 650 MG: 325 TABLET ORAL at 17:28

## 2021-01-01 RX ADMIN — DILTIAZEM HYDROCHLORIDE 240 MG: 120 CAPSULE, COATED, EXTENDED RELEASE ORAL at 09:21

## 2021-01-01 RX ADMIN — MORPHINE SULFATE 2 MG: 2 INJECTION, SOLUTION INTRAMUSCULAR; INTRAVENOUS at 18:56

## 2021-01-01 RX ADMIN — SODIUM CHLORIDE, SODIUM LACTATE, POTASSIUM CHLORIDE, AND CALCIUM CHLORIDE 75 ML/HR: 600; 310; 30; 20 INJECTION, SOLUTION INTRAVENOUS at 23:55

## 2021-01-01 RX ADMIN — ESCITALOPRAM OXALATE 10 MG: 10 TABLET ORAL at 09:08

## 2021-01-01 RX ADMIN — SODIUM CHLORIDE, PRESERVATIVE FREE 3 ML: 5 INJECTION INTRAVENOUS at 04:18

## 2021-01-01 RX ADMIN — Medication 10 ML: at 14:09

## 2021-01-01 RX ADMIN — SENNOSIDES AND DOCUSATE SODIUM 1 TABLET: 8.6; 5 TABLET ORAL at 20:47

## 2021-01-01 RX ADMIN — MORPHINE SULFATE 10 MG: 10 SOLUTION ORAL at 11:46

## 2021-01-01 RX ADMIN — Medication 10 ML: at 14:41

## 2021-01-01 RX ADMIN — SENNOSIDES 8.6 MG: 8.6 TABLET, FILM COATED ORAL at 17:56

## 2021-01-01 RX ADMIN — ESCITALOPRAM OXALATE 10 MG: 10 TABLET ORAL at 09:13

## 2021-01-01 RX ADMIN — HALOPERIDOL LACTATE 2 MG: 5 INJECTION, SOLUTION INTRAMUSCULAR at 21:20

## 2021-01-01 RX ADMIN — DILTIAZEM HYDROCHLORIDE 240 MG: 120 CAPSULE, COATED, EXTENDED RELEASE ORAL at 09:14

## 2021-01-01 RX ADMIN — CEFTRIAXONE 1 G: 1 INJECTION, POWDER, FOR SOLUTION INTRAMUSCULAR; INTRAVENOUS at 23:49

## 2021-01-01 RX ADMIN — PRAVASTATIN SODIUM 40 MG: 20 TABLET ORAL at 21:08

## 2021-01-01 RX ADMIN — HALOPERIDOL 2 MG: 1 TABLET ORAL at 00:40

## 2021-01-01 RX ADMIN — ALLOPURINOL 300 MG: 300 TABLET ORAL at 09:15

## 2021-01-01 RX ADMIN — POTASSIUM CHLORIDE 40 MEQ: 20 TABLET, EXTENDED RELEASE ORAL at 17:24

## 2021-01-01 RX ADMIN — SODIUM CHLORIDE, PRESERVATIVE FREE 3 ML: 5 INJECTION INTRAVENOUS at 12:09

## 2021-01-01 RX ADMIN — Medication 10 ML: at 14:00

## 2021-01-01 RX ADMIN — Medication 10 ML: at 05:45

## 2021-01-01 RX ADMIN — Medication 5 MG: at 21:00

## 2021-01-01 RX ADMIN — ASPIRIN 81 MG: 81 TABLET, CHEWABLE ORAL at 08:25

## 2021-01-01 RX ADMIN — ENOXAPARIN SODIUM 30 MG: 40 INJECTION SUBCUTANEOUS at 11:51

## 2021-01-01 RX ADMIN — FOLIC ACID: 5 INJECTION, SOLUTION INTRAMUSCULAR; INTRAVENOUS; SUBCUTANEOUS at 14:53

## 2021-01-01 RX ADMIN — SODIUM CHLORIDE, PRESERVATIVE FREE 3 ML: 5 INJECTION INTRAVENOUS at 10:41

## 2021-01-01 RX ADMIN — SODIUM CHLORIDE, PRESERVATIVE FREE 3 ML: 5 INJECTION INTRAVENOUS at 09:20

## 2021-01-01 RX ADMIN — SENNOSIDES 8.6 MG: 8.6 TABLET, FILM COATED ORAL at 10:40

## 2021-01-01 RX ADMIN — POTASSIUM CHLORIDE 40 MEQ: 20 TABLET, EXTENDED RELEASE ORAL at 17:36

## 2021-01-01 RX ADMIN — PREDNISONE 40 MG: 10 TABLET ORAL at 12:30

## 2021-01-01 RX ADMIN — ALLOPURINOL 300 MG: 300 TABLET ORAL at 17:27

## 2021-01-01 RX ADMIN — ONDANSETRON 4 MG: 2 INJECTION INTRAMUSCULAR; INTRAVENOUS at 02:47

## 2021-01-01 RX ADMIN — DILTIAZEM HYDROCHLORIDE 240 MG: 120 CAPSULE, COATED, EXTENDED RELEASE ORAL at 10:47

## 2021-01-01 RX ADMIN — MORPHINE SULFATE 10 MG: 10 SOLUTION ORAL at 16:58

## 2021-01-01 RX ADMIN — MAGNESIUM SULFATE HEPTAHYDRATE 2 G: 40 INJECTION, SOLUTION INTRAVENOUS at 08:09

## 2021-01-01 RX ADMIN — Medication 10 ML: at 15:07

## 2021-01-01 RX ADMIN — POTASSIUM CHLORIDE 40 MEQ: 20 TABLET, EXTENDED RELEASE ORAL at 15:59

## 2021-01-01 RX ADMIN — ASPIRIN 81 MG: 81 TABLET, CHEWABLE ORAL at 09:21

## 2021-01-01 RX ADMIN — HALOPERIDOL LACTATE 2 MG: 5 INJECTION, SOLUTION INTRAMUSCULAR at 23:51

## 2021-01-01 RX ADMIN — IBUPROFEN 600 MG: 600 TABLET, FILM COATED ORAL at 12:30

## 2021-01-01 RX ADMIN — HALOPERIDOL 2 MG: 1 TABLET ORAL at 12:40

## 2021-01-01 RX ADMIN — POTASSIUM CHLORIDE 40 MEQ: 20 TABLET, EXTENDED RELEASE ORAL at 07:58

## 2021-01-01 RX ADMIN — HYDRALAZINE HYDROCHLORIDE 20 MG: 20 INJECTION INTRAMUSCULAR; INTRAVENOUS at 15:01

## 2021-01-01 RX ADMIN — POTASSIUM CHLORIDE 40 MEQ: 20 TABLET, EXTENDED RELEASE ORAL at 17:06

## 2021-01-01 RX ADMIN — ESCITALOPRAM OXALATE 10 MG: 10 TABLET ORAL at 09:15

## 2021-01-01 RX ADMIN — POTASSIUM CHLORIDE 40 MEQ: 20 TABLET, EXTENDED RELEASE ORAL at 12:23

## 2021-01-01 RX ADMIN — DILTIAZEM HYDROCHLORIDE 240 MG: 120 CAPSULE, COATED, EXTENDED RELEASE ORAL at 09:13

## 2021-01-01 RX ADMIN — FAMOTIDINE 20 MG: 20 TABLET ORAL at 09:21

## 2021-01-01 RX ADMIN — SODIUM CHLORIDE, SODIUM LACTATE, POTASSIUM CHLORIDE, AND CALCIUM CHLORIDE 75 ML/HR: 600; 310; 30; 20 INJECTION, SOLUTION INTRAVENOUS at 05:58

## 2021-01-01 RX ADMIN — SODIUM CHLORIDE, PRESERVATIVE FREE 3 ML: 5 INJECTION INTRAVENOUS at 04:36

## 2021-01-01 RX ADMIN — POTASSIUM CHLORIDE 40 MEQ: 20 TABLET, EXTENDED RELEASE ORAL at 09:14

## 2021-01-01 RX ADMIN — CEFTRIAXONE 1 G: 1 INJECTION, POWDER, FOR SOLUTION INTRAMUSCULAR; INTRAVENOUS at 21:49

## 2021-01-01 RX ADMIN — POLYETHYLENE GLYCOL 3350 17 G: 17 POWDER, FOR SOLUTION ORAL at 08:09

## 2021-01-01 RX ADMIN — ASPIRIN 81 MG: 81 TABLET, CHEWABLE ORAL at 08:09

## 2021-01-01 RX ADMIN — Medication 400 MG: at 10:46

## 2021-01-01 RX ADMIN — SODIUM CHLORIDE, SODIUM LACTATE, POTASSIUM CHLORIDE, AND CALCIUM CHLORIDE 75 ML/HR: 600; 310; 30; 20 INJECTION, SOLUTION INTRAVENOUS at 14:11

## 2021-01-01 RX ADMIN — MAGNESIUM GLUCONATE 500 MG ORAL TABLET 400 MG: 500 TABLET ORAL at 08:33

## 2021-01-01 RX ADMIN — ENOXAPARIN SODIUM 40 MG: 40 INJECTION SUBCUTANEOUS at 09:13

## 2021-01-01 RX ADMIN — Medication 10 ML: at 16:18

## 2021-01-01 RX ADMIN — DILTIAZEM HYDROCHLORIDE 240 MG: 120 CAPSULE, COATED, EXTENDED RELEASE ORAL at 08:32

## 2021-01-01 RX ADMIN — LORAZEPAM 1 MG: 1 TABLET ORAL at 17:40

## 2021-01-01 RX ADMIN — HYDRALAZINE HYDROCHLORIDE 10 MG: 10 TABLET, FILM COATED ORAL at 18:23

## 2021-01-01 RX ADMIN — Medication 100 MG: at 09:15

## 2021-01-01 RX ADMIN — SODIUM CHLORIDE, PRESERVATIVE FREE 3 ML: 5 INJECTION INTRAVENOUS at 21:20

## 2021-01-01 RX ADMIN — ZOLPIDEM TARTRATE 5 MG: 5 TABLET ORAL at 03:37

## 2021-01-01 RX ADMIN — ASPIRIN 81 MG 81 MG: 81 TABLET ORAL at 08:34

## 2021-01-01 RX ADMIN — SENNOSIDES 8.6 MG: 8.6 TABLET, FILM COATED ORAL at 09:40

## 2021-01-01 RX ADMIN — HYDRALAZINE HYDROCHLORIDE 10 MG: 10 TABLET, FILM COATED ORAL at 17:06

## 2021-01-01 RX ADMIN — MORPHINE SULFATE 2 MG: 2 INJECTION, SOLUTION INTRAMUSCULAR; INTRAVENOUS at 09:14

## 2021-01-01 RX ADMIN — Medication 5 MG: at 22:32

## 2021-01-01 RX ADMIN — ALLOPURINOL 300 MG: 300 TABLET ORAL at 17:06

## 2021-01-01 RX ADMIN — SODIUM CHLORIDE, PRESERVATIVE FREE 3 ML: 5 INJECTION INTRAVENOUS at 10:47

## 2021-01-01 RX ADMIN — ENOXAPARIN SODIUM 40 MG: 40 INJECTION SUBCUTANEOUS at 08:23

## 2021-01-01 RX ADMIN — Medication 5 MG: at 21:23

## 2021-01-01 RX ADMIN — PRAVASTATIN SODIUM 40 MG: 20 TABLET ORAL at 20:47

## 2021-01-01 RX ADMIN — DILTIAZEM HYDROCHLORIDE 240 MG: 120 CAPSULE, COATED, EXTENDED RELEASE ORAL at 08:25

## 2021-01-01 RX ADMIN — MORPHINE SULFATE 2 MG: 2 INJECTION, SOLUTION INTRAMUSCULAR; INTRAVENOUS at 21:48

## 2021-01-01 RX ADMIN — Medication 10 ML: at 22:02

## 2021-01-01 RX ADMIN — ESCITALOPRAM OXALATE 10 MG: 10 TABLET ORAL at 08:25

## 2021-01-01 RX ADMIN — ESCITALOPRAM OXALATE 10 MG: 10 TABLET ORAL at 08:09

## 2021-01-01 RX ADMIN — MORPHINE SULFATE 2 MG: 2 INJECTION, SOLUTION INTRAMUSCULAR; INTRAVENOUS at 16:47

## 2021-01-01 RX ADMIN — ENOXAPARIN SODIUM 40 MG: 40 INJECTION SUBCUTANEOUS at 08:08

## 2021-01-01 RX ADMIN — HALOPERIDOL 2 MG: 1 TABLET ORAL at 13:47

## 2021-01-01 RX ADMIN — MORPHINE SULFATE 10 MG: 10 SOLUTION ORAL at 12:40

## 2021-01-01 RX ADMIN — POTASSIUM CHLORIDE 40 MEQ: 20 TABLET, EXTENDED RELEASE ORAL at 20:24

## 2021-01-01 RX ADMIN — LORAZEPAM 1 MG: 1 TABLET ORAL at 09:52

## 2021-01-01 RX ADMIN — MORPHINE SULFATE 10 MG: 10 SOLUTION ORAL at 13:47

## 2021-01-01 RX ADMIN — PRASUGREL HYDROCHLORIDE 10 MG: 10 TABLET, FILM COATED ORAL at 08:39

## 2021-01-01 RX ADMIN — POTASSIUM CHLORIDE 20 MEQ: 14.9 INJECTION, SOLUTION INTRAVENOUS at 16:49

## 2021-01-01 RX ADMIN — SODIUM CHLORIDE, PRESERVATIVE FREE 3 ML: 5 INJECTION INTRAVENOUS at 09:14

## 2021-01-01 RX ADMIN — LORAZEPAM 1 MG: 1 TABLET ORAL at 21:29

## 2021-01-01 RX ADMIN — HYDRALAZINE HYDROCHLORIDE 10 MG: 10 TABLET, FILM COATED ORAL at 09:15

## 2021-01-01 RX ADMIN — HALOPERIDOL 2 MG: 1 TABLET ORAL at 16:58

## 2021-01-01 RX ADMIN — SODIUM CHLORIDE, PRESERVATIVE FREE 3 ML: 5 INJECTION INTRAVENOUS at 21:24

## 2021-01-01 RX ADMIN — SODIUM CHLORIDE, SODIUM LACTATE, POTASSIUM CHLORIDE, AND CALCIUM CHLORIDE 75 ML/HR: 600; 310; 30; 20 INJECTION, SOLUTION INTRAVENOUS at 21:04

## 2021-01-01 RX ADMIN — SODIUM CHLORIDE, SODIUM LACTATE, POTASSIUM CHLORIDE, AND CALCIUM CHLORIDE 75 ML/HR: 600; 310; 30; 20 INJECTION, SOLUTION INTRAVENOUS at 10:44

## 2021-01-01 RX ADMIN — HYDRALAZINE HYDROCHLORIDE 10 MG: 10 TABLET, FILM COATED ORAL at 21:25

## 2021-01-01 RX ADMIN — ASPIRIN 81 MG 81 MG: 81 TABLET ORAL at 09:13

## 2021-01-01 RX ADMIN — MORPHINE SULFATE 2 MG: 2 INJECTION, SOLUTION INTRAMUSCULAR; INTRAVENOUS at 16:35

## 2021-01-01 RX ADMIN — MORPHINE SULFATE 2 MG: 2 INJECTION, SOLUTION INTRAMUSCULAR; INTRAVENOUS at 04:17

## 2021-01-01 RX ADMIN — Medication 10 ML: at 21:50

## 2021-01-01 RX ADMIN — PRAVASTATIN SODIUM 40 MG: 20 TABLET ORAL at 21:25

## 2021-01-01 RX ADMIN — MORPHINE SULFATE 2 MG: 2 INJECTION, SOLUTION INTRAMUSCULAR; INTRAVENOUS at 10:41

## 2021-01-01 RX ADMIN — MORPHINE SULFATE 2 MG: 2 INJECTION, SOLUTION INTRAMUSCULAR; INTRAVENOUS at 07:24

## 2021-01-01 RX ADMIN — SODIUM CHLORIDE 25 ML/HR: 900 INJECTION, SOLUTION INTRAVENOUS at 15:16

## 2021-01-01 RX ADMIN — SENNOSIDES AND DOCUSATE SODIUM 1 TABLET: 8.6; 5 TABLET ORAL at 08:10

## 2021-01-01 RX ADMIN — ENOXAPARIN SODIUM 30 MG: 40 INJECTION SUBCUTANEOUS at 08:36

## 2021-01-01 RX ADMIN — HALOPERIDOL LACTATE 2 MG: 5 INJECTION, SOLUTION INTRAMUSCULAR at 09:13

## 2021-01-01 RX ADMIN — POLYETHYLENE GLYCOL 3350 17 G: 17 POWDER, FOR SOLUTION ORAL at 08:23

## 2021-01-01 RX ADMIN — HALOPERIDOL LACTATE 2 MG: 5 INJECTION, SOLUTION INTRAMUSCULAR at 21:48

## 2021-01-01 RX ADMIN — TRAMADOL HYDROCHLORIDE 100 MG: 50 TABLET, FILM COATED ORAL at 12:30

## 2021-01-01 RX ADMIN — FOLIC ACID 1 MG: 1 TABLET ORAL at 08:33

## 2021-01-01 RX ADMIN — Medication 10 ML: at 15:58

## 2021-01-01 RX ADMIN — MORPHINE SULFATE 2 MG: 2 INJECTION, SOLUTION INTRAMUSCULAR; INTRAVENOUS at 21:15

## 2021-01-01 RX ADMIN — SENNOSIDES 8.6 MG: 8.6 TABLET, FILM COATED ORAL at 10:47

## 2021-01-01 RX ADMIN — Medication 10 ML: at 06:14

## 2021-01-01 RX ADMIN — SODIUM CHLORIDE, PRESERVATIVE FREE 3 ML: 5 INJECTION INTRAVENOUS at 05:20

## 2021-01-01 RX ADMIN — HALOPERIDOL 2 MG: 1 TABLET ORAL at 12:18

## 2021-01-01 RX ADMIN — OLANZAPINE 2.5 MG: 2.5 TABLET, FILM COATED ORAL at 21:07

## 2021-01-01 RX ADMIN — ASPIRIN 81 MG: 81 TABLET, CHEWABLE ORAL at 08:18

## 2021-01-01 RX ADMIN — SENNOSIDES AND DOCUSATE SODIUM 1 TABLET: 8.6; 5 TABLET ORAL at 08:25

## 2021-01-01 RX ADMIN — LORAZEPAM 1 MG: 1 TABLET ORAL at 04:53

## 2021-01-01 RX ADMIN — PRASUGREL 10 MG: 10 TABLET, FILM COATED ORAL at 09:00

## 2021-01-01 RX ADMIN — CEFTRIAXONE 1 G: 1 INJECTION, POWDER, FOR SOLUTION INTRAMUSCULAR; INTRAVENOUS at 21:25

## 2021-01-01 RX ADMIN — DILTIAZEM HYDROCHLORIDE 240 MG: 120 CAPSULE, COATED, EXTENDED RELEASE ORAL at 07:58

## 2021-01-01 RX ADMIN — IOPAMIDOL 100 ML: 755 INJECTION, SOLUTION INTRAVENOUS at 16:18

## 2021-01-01 RX ADMIN — POTASSIUM CHLORIDE 40 MEQ: 20 TABLET, EXTENDED RELEASE ORAL at 08:35

## 2021-01-01 RX ADMIN — FAMOTIDINE 20 MG: 20 TABLET, FILM COATED ORAL at 09:13

## 2021-01-01 RX ADMIN — HALOPERIDOL LACTATE 2 MG: 5 INJECTION, SOLUTION INTRAMUSCULAR at 08:19

## 2021-01-01 RX ADMIN — MORPHINE SULFATE 2 MG: 2 INJECTION, SOLUTION INTRAMUSCULAR; INTRAVENOUS at 09:19

## 2021-01-01 RX ADMIN — POTASSIUM CHLORIDE 40 MEQ: 20 TABLET, EXTENDED RELEASE ORAL at 18:23

## 2021-01-01 RX ADMIN — Medication 10 ML: at 01:17

## 2021-01-01 RX ADMIN — HYDRALAZINE HYDROCHLORIDE 10 MG: 10 TABLET, FILM COATED ORAL at 10:48

## 2021-01-01 RX ADMIN — SODIUM CHLORIDE, PRESERVATIVE FREE 3 ML: 5 INJECTION INTRAVENOUS at 07:25

## 2021-01-01 RX ADMIN — FAMOTIDINE 20 MG: 20 TABLET ORAL at 10:46

## 2021-01-01 RX ADMIN — Medication 10 ML: at 14:20

## 2021-01-01 RX ADMIN — FAMOTIDINE 20 MG: 20 TABLET ORAL at 08:18

## 2021-01-01 RX ADMIN — ESCITALOPRAM OXALATE 10 MG: 10 TABLET ORAL at 10:46

## 2021-01-01 RX ADMIN — POTASSIUM CHLORIDE 40 MEQ: 20 TABLET, EXTENDED RELEASE ORAL at 09:13

## 2021-01-01 RX ADMIN — FAMOTIDINE 20 MG: 20 TABLET ORAL at 08:09

## 2021-01-01 RX ADMIN — HALOPERIDOL LACTATE 2 MG: 5 INJECTION, SOLUTION INTRAMUSCULAR at 10:40

## 2021-01-01 RX ADMIN — SODIUM CHLORIDE, PRESERVATIVE FREE 3 ML: 5 INJECTION INTRAVENOUS at 16:12

## 2021-01-01 RX ADMIN — SODIUM CHLORIDE, SODIUM LACTATE, POTASSIUM CHLORIDE, AND CALCIUM CHLORIDE 75 ML/HR: 600; 310; 30; 20 INJECTION, SOLUTION INTRAVENOUS at 18:33

## 2021-01-01 RX ADMIN — PRAVASTATIN SODIUM 40 MG: 20 TABLET ORAL at 21:51

## 2021-01-01 RX ADMIN — Medication 10 ML: at 05:46

## 2021-01-01 RX ADMIN — Medication 10 ML: at 05:38

## 2021-01-01 RX ADMIN — ACETAMINOPHEN 650 MG: 325 TABLET ORAL at 02:01

## 2021-01-01 RX ADMIN — MORPHINE SULFATE 2 MG: 2 INJECTION, SOLUTION INTRAMUSCULAR; INTRAVENOUS at 10:54

## 2021-01-01 RX ADMIN — ENOXAPARIN SODIUM 40 MG: 40 INJECTION SUBCUTANEOUS at 08:18

## 2021-01-01 RX ADMIN — HALOPERIDOL LACTATE 2 MG: 5 INJECTION, SOLUTION INTRAMUSCULAR at 09:20

## 2021-01-01 RX ADMIN — SODIUM CHLORIDE, PRESERVATIVE FREE 3 ML: 5 INJECTION INTRAVENOUS at 21:48

## 2021-01-01 RX ADMIN — SODIUM CHLORIDE, PRESERVATIVE FREE 3 ML: 5 INJECTION INTRAVENOUS at 16:23

## 2021-01-01 RX ADMIN — Medication 10 ML: at 21:54

## 2021-01-01 RX ADMIN — ESCITALOPRAM OXALATE 10 MG: 10 TABLET ORAL at 07:58

## 2021-01-01 RX ADMIN — LORAZEPAM 1 MG: 1 TABLET ORAL at 20:40

## 2021-01-01 RX ADMIN — LORAZEPAM 0.5 MG: 0.5 TABLET ORAL at 21:49

## 2021-01-01 RX ADMIN — Medication 10 ML: at 04:55

## 2021-01-01 RX ADMIN — MORPHINE SULFATE 2 MG: 2 INJECTION, SOLUTION INTRAMUSCULAR; INTRAVENOUS at 10:47

## 2021-01-01 RX ADMIN — MORPHINE SULFATE 10 MG: 10 SOLUTION ORAL at 17:40

## 2021-01-01 RX ADMIN — SENNOSIDES 8.6 MG: 8.6 TABLET, FILM COATED ORAL at 17:02

## 2021-01-01 RX ADMIN — DILTIAZEM HYDROCHLORIDE 240 MG: 120 CAPSULE, COATED, EXTENDED RELEASE ORAL at 08:18

## 2021-01-01 RX ADMIN — ENOXAPARIN SODIUM 40 MG: 40 INJECTION SUBCUTANEOUS at 08:02

## 2021-01-01 RX ADMIN — POTASSIUM CHLORIDE 40 MEQ: 20 TABLET, EXTENDED RELEASE ORAL at 17:07

## 2021-01-01 RX ADMIN — MORPHINE SULFATE 2 MG: 2 INJECTION, SOLUTION INTRAMUSCULAR; INTRAVENOUS at 09:52

## 2021-01-01 RX ADMIN — SODIUM CHLORIDE, SODIUM LACTATE, POTASSIUM CHLORIDE, AND CALCIUM CHLORIDE 125 ML/HR: 600; 310; 30; 20 INJECTION, SOLUTION INTRAVENOUS at 18:23

## 2021-01-01 RX ADMIN — POLYETHYLENE GLYCOL 3350 17 G: 17 POWDER, FOR SOLUTION ORAL at 09:08

## 2021-01-01 RX ADMIN — Medication 5 MG: at 22:59

## 2021-01-01 RX ADMIN — OLANZAPINE 5 MG: 5 TABLET, FILM COATED ORAL at 17:06

## 2021-01-01 RX ADMIN — ALLOPURINOL 300 MG: 300 TABLET ORAL at 07:58

## 2021-01-01 RX ADMIN — SODIUM CHLORIDE, PRESERVATIVE FREE 3 ML: 5 INJECTION INTRAVENOUS at 16:35

## 2021-01-01 RX ADMIN — HYDRALAZINE HYDROCHLORIDE 10 MG: 10 TABLET, FILM COATED ORAL at 15:47

## 2021-01-01 RX ADMIN — FAMOTIDINE 20 MG: 20 TABLET ORAL at 08:25

## 2021-01-01 RX ADMIN — POTASSIUM CHLORIDE 40 MEQ: 20 TABLET, EXTENDED RELEASE ORAL at 16:40

## 2021-01-01 RX ADMIN — Medication 10 ML: at 05:33

## 2021-01-01 RX ADMIN — OLANZAPINE 5 MG: 5 TABLET, FILM COATED ORAL at 17:27

## 2021-01-01 RX ADMIN — FAMOTIDINE 20 MG: 20 TABLET, FILM COATED ORAL at 08:33

## 2021-01-01 RX ADMIN — FENTANYL CITRATE 50 MCG: 50 INJECTION INTRAMUSCULAR; INTRAVENOUS at 15:24

## 2021-01-01 RX ADMIN — MORPHINE SULFATE 2 MG: 2 INJECTION, SOLUTION INTRAMUSCULAR; INTRAVENOUS at 21:20

## 2021-01-01 RX ADMIN — SODIUM CHLORIDE, PRESERVATIVE FREE 3 ML: 5 INJECTION INTRAVENOUS at 18:56

## 2021-01-01 RX ADMIN — MIDAZOLAM 1 MG: 1 INJECTION INTRAMUSCULAR; INTRAVENOUS at 15:24

## 2021-01-01 RX ADMIN — HALOPERIDOL 2 MG: 1 TABLET ORAL at 01:02

## 2021-01-01 RX ADMIN — Medication 10 ML: at 05:37

## 2021-01-01 RX ADMIN — Medication 10 ML: at 21:00

## 2021-01-01 RX ADMIN — ALLOPURINOL 300 MG: 300 TABLET ORAL at 17:24

## 2021-01-01 RX ADMIN — PRASUGREL HYDROCHLORIDE 10 MG: 10 TABLET, FILM COATED ORAL at 08:56

## 2021-01-01 RX ADMIN — Medication 10 ML: at 21:05

## 2021-01-01 RX ADMIN — SODIUM CHLORIDE, SODIUM LACTATE, POTASSIUM CHLORIDE, AND CALCIUM CHLORIDE 100 ML/HR: 600; 310; 30; 20 INJECTION, SOLUTION INTRAVENOUS at 00:07

## 2021-01-01 RX ADMIN — MORPHINE SULFATE 2 MG: 2 INJECTION, SOLUTION INTRAMUSCULAR; INTRAVENOUS at 12:09

## 2021-01-01 RX ADMIN — POTASSIUM CHLORIDE 40 MEQ: 20 TABLET, EXTENDED RELEASE ORAL at 08:25

## 2021-01-01 RX ADMIN — SODIUM CHLORIDE, PRESERVATIVE FREE 3 ML: 5 INJECTION INTRAVENOUS at 12:37

## 2021-01-01 RX ADMIN — Medication 5 MG: at 21:51

## 2021-01-01 RX ADMIN — CEFTRIAXONE 1 G: 1 INJECTION, POWDER, FOR SOLUTION INTRAMUSCULAR; INTRAVENOUS at 20:39

## 2021-01-01 RX ADMIN — LIDOCAINE HYDROCHLORIDE 200 MG: 20 INJECTION, SOLUTION INFILTRATION; PERINEURAL at 15:34

## 2021-01-01 RX ADMIN — HYDRALAZINE HYDROCHLORIDE 10 MG: 10 TABLET, FILM COATED ORAL at 07:58

## 2021-01-01 RX ADMIN — HYDRALAZINE HYDROCHLORIDE 10 MG: 10 TABLET, FILM COATED ORAL at 16:40

## 2021-01-01 RX ADMIN — MORPHINE SULFATE 10 MG: 10 SOLUTION ORAL at 16:27

## 2021-01-01 RX ADMIN — PRAVASTATIN SODIUM 40 MG: 20 TABLET ORAL at 22:32

## 2021-01-01 RX ADMIN — PRAVASTATIN SODIUM 40 MG: 20 TABLET ORAL at 21:23

## 2021-01-01 RX ADMIN — HYDRALAZINE HYDROCHLORIDE 10 MG: 10 TABLET, FILM COATED ORAL at 21:29

## 2021-01-01 RX ADMIN — HYDRALAZINE HYDROCHLORIDE 10 MG: 10 TABLET, FILM COATED ORAL at 21:49

## 2021-01-01 RX ADMIN — POTASSIUM CHLORIDE 40 MEQ: 20 TABLET, EXTENDED RELEASE ORAL at 17:27

## 2021-01-01 RX ADMIN — LORAZEPAM 1 MG: 1 TABLET ORAL at 12:37

## 2021-01-01 RX ADMIN — Medication 10 ML: at 15:45

## 2021-01-01 RX ADMIN — PRAVASTATIN SODIUM 40 MG: 20 TABLET ORAL at 21:49

## 2021-01-01 RX ADMIN — ENOXAPARIN SODIUM 30 MG: 40 INJECTION SUBCUTANEOUS at 09:00

## 2021-01-01 RX ADMIN — MORPHINE SULFATE 2 MG: 2 INJECTION, SOLUTION INTRAMUSCULAR; INTRAVENOUS at 16:12

## 2021-01-01 RX ADMIN — HALOPERIDOL 2 MG: 1 TABLET ORAL at 00:52

## 2021-01-01 RX ADMIN — PRAVASTATIN SODIUM 40 MG: 20 TABLET ORAL at 20:16

## 2021-01-01 RX ADMIN — HYDRALAZINE HYDROCHLORIDE 10 MG: 10 TABLET, FILM COATED ORAL at 15:49

## 2021-01-01 RX ADMIN — LACTULOSE 30 ML: 20 SOLUTION ORAL at 10:47

## 2021-01-01 RX ADMIN — ONDANSETRON 4 MG: 2 INJECTION INTRAMUSCULAR; INTRAVENOUS at 10:38

## 2021-01-01 RX ADMIN — SODIUM CHLORIDE, SODIUM LACTATE, POTASSIUM CHLORIDE, AND CALCIUM CHLORIDE 75 ML/HR: 600; 310; 30; 20 INJECTION, SOLUTION INTRAVENOUS at 09:17

## 2021-01-01 RX ADMIN — Medication 5 MG: at 21:08

## 2021-01-01 RX ADMIN — OLANZAPINE 5 MG: 5 TABLET, FILM COATED ORAL at 18:33

## 2021-01-01 RX ADMIN — Medication 10 ML: at 05:36

## 2021-01-01 RX ADMIN — HYDRALAZINE HYDROCHLORIDE 10 MG: 10 TABLET, FILM COATED ORAL at 08:34

## 2021-01-01 RX ADMIN — LORAZEPAM 1 MG: 1 TABLET ORAL at 00:18

## 2021-01-01 RX ADMIN — ASPIRIN 81 MG 81 MG: 81 TABLET ORAL at 07:58

## 2021-01-01 RX ADMIN — MORPHINE SULFATE 10 MG: 10 SOLUTION ORAL at 06:18

## 2021-01-01 RX ADMIN — HYDRALAZINE HYDROCHLORIDE 10 MG: 10 TABLET, FILM COATED ORAL at 09:13

## 2021-01-01 RX ADMIN — MAGNESIUM GLUCONATE 500 MG ORAL TABLET 400 MG: 500 TABLET ORAL at 07:58

## 2021-01-01 RX ADMIN — ENOXAPARIN SODIUM 30 MG: 40 INJECTION SUBCUTANEOUS at 09:15

## 2021-01-01 RX ADMIN — ENOXAPARIN SODIUM 40 MG: 40 INJECTION SUBCUTANEOUS at 09:08

## 2021-01-01 RX ADMIN — SENNOSIDES AND DOCUSATE SODIUM 1 TABLET: 8.6; 5 TABLET ORAL at 09:08

## 2021-01-01 RX ADMIN — HYDRALAZINE HYDROCHLORIDE 10 MG: 10 TABLET, FILM COATED ORAL at 20:40

## 2021-01-01 RX ADMIN — MORPHINE SULFATE 2 MG: 2 INJECTION, SOLUTION INTRAMUSCULAR; INTRAVENOUS at 16:23

## 2021-01-01 RX ADMIN — SODIUM CHLORIDE, PRESERVATIVE FREE 3 ML: 5 INJECTION INTRAVENOUS at 21:15

## 2021-01-01 RX ADMIN — ASPIRIN 81 MG: 81 TABLET, CHEWABLE ORAL at 10:46

## 2021-01-01 RX ADMIN — OLANZAPINE 5 MG: 5 TABLET, FILM COATED ORAL at 16:40

## 2021-01-01 RX ADMIN — MAGNESIUM GLUCONATE 500 MG ORAL TABLET 400 MG: 500 TABLET ORAL at 09:14

## 2021-01-01 RX ADMIN — POTASSIUM CHLORIDE 20 MEQ: 20 TABLET, EXTENDED RELEASE ORAL at 12:22

## 2021-01-01 RX ADMIN — Medication 10 ML: at 13:39

## 2021-01-01 RX ADMIN — OLANZAPINE 5 MG: 5 TABLET, FILM COATED ORAL at 17:23

## 2021-01-01 RX ADMIN — ENOXAPARIN SODIUM 40 MG: 40 INJECTION SUBCUTANEOUS at 09:21

## 2021-01-01 RX ADMIN — HYDRALAZINE HYDROCHLORIDE 10 MG: 10 TABLET, FILM COATED ORAL at 23:49

## 2021-01-01 RX ADMIN — TUBERCULIN PURIFIED PROTEIN DERIVATIVE 5 UNITS: 5 INJECTION, SOLUTION INTRADERMAL at 17:28

## 2021-01-01 RX ADMIN — SODIUM CHLORIDE, PRESERVATIVE FREE 3 ML: 5 INJECTION INTRAVENOUS at 08:19

## 2021-01-01 RX ADMIN — PRAVASTATIN SODIUM 40 MG: 20 TABLET ORAL at 20:24

## 2021-01-01 RX ADMIN — ALLOPURINOL 300 MG: 300 TABLET ORAL at 16:40

## 2021-01-01 RX ADMIN — POTASSIUM CHLORIDE 40 MEQ: 20 TABLET, EXTENDED RELEASE ORAL at 18:32

## 2021-01-01 RX ADMIN — ASPIRIN 81 MG 81 MG: 81 TABLET ORAL at 09:15

## 2021-01-01 RX ADMIN — SODIUM CHLORIDE, PRESERVATIVE FREE 3 ML: 5 INJECTION INTRAVENOUS at 16:48

## 2021-01-01 RX ADMIN — ONDANSETRON 4 MG: 2 INJECTION INTRAMUSCULAR; INTRAVENOUS at 20:36

## 2021-01-01 RX ADMIN — Medication 10 ML: at 13:45

## 2021-01-01 RX ADMIN — Medication 10 ML: at 05:16

## 2021-01-01 RX ADMIN — Medication 10 ML: at 21:29

## 2021-01-01 RX ADMIN — CALCITONIN SALMON 300 INT'L UNITS: 200 INJECTION, SOLUTION INTRAMUSCULAR; SUBCUTANEOUS at 20:46

## 2021-01-01 RX ADMIN — HALOPERIDOL LACTATE 2 MG: 5 INJECTION, SOLUTION INTRAMUSCULAR at 21:14

## 2021-01-01 RX ADMIN — PRASUGREL HYDROCHLORIDE 10 MG: 10 TABLET, FILM COATED ORAL at 09:17

## 2021-01-01 RX ADMIN — SENNOSIDES 8.6 MG: 8.6 TABLET, FILM COATED ORAL at 08:18

## 2021-01-01 RX ADMIN — FOLIC ACID: 5 INJECTION, SOLUTION INTRAMUSCULAR; INTRAVENOUS; SUBCUTANEOUS at 08:56

## 2021-01-01 RX ADMIN — MORPHINE SULFATE 2 MG: 2 INJECTION, SOLUTION INTRAMUSCULAR; INTRAVENOUS at 12:36

## 2021-01-01 RX ADMIN — Medication 5 MG: at 21:53

## 2021-01-01 RX ADMIN — FAMOTIDINE 20 MG: 20 TABLET ORAL at 09:09

## 2021-01-01 RX ADMIN — CEFTRIAXONE 1 G: 1 INJECTION, POWDER, FOR SOLUTION INTRAMUSCULAR; INTRAVENOUS at 21:29

## 2021-01-01 RX ADMIN — Medication 10 ML: at 22:18

## 2021-01-01 RX ADMIN — CYCLOBENZAPRINE 5 MG: 10 TABLET, FILM COATED ORAL at 12:30

## 2021-01-01 RX ADMIN — OLANZAPINE 5 MG: 5 TABLET, FILM COATED ORAL at 18:23

## 2021-01-01 RX ADMIN — MORPHINE SULFATE 10 MG: 10 SOLUTION ORAL at 04:47

## 2021-01-01 RX ADMIN — Medication 10 ML: at 21:51

## 2021-01-01 RX ADMIN — Medication 10 ML: at 06:31

## 2021-01-01 RX ADMIN — PRASUGREL HYDROCHLORIDE 10 MG: 10 TABLET, FILM COATED ORAL at 09:14

## 2021-01-01 RX ADMIN — FOLIC ACID 1 MG: 1 TABLET ORAL at 09:15

## 2021-01-01 RX ADMIN — ZOLEDRONIC ACID 4 MG: 0.04 INJECTION, SOLUTION INTRAVENOUS at 10:04

## 2021-01-01 RX ADMIN — SODIUM CHLORIDE, PRESERVATIVE FREE 3 ML: 5 INJECTION INTRAVENOUS at 10:55

## 2021-01-01 RX ADMIN — Medication 100 MG: at 08:33

## 2021-01-01 RX ADMIN — SODIUM CHLORIDE 500 ML: 900 INJECTION, SOLUTION INTRAVENOUS at 22:35

## 2021-01-01 RX ADMIN — ALLOPURINOL 300 MG: 300 TABLET ORAL at 08:34

## 2021-01-01 RX ADMIN — CALCITONIN SALMON 300 INT'L UNITS: 200 INJECTION, SOLUTION INTRAMUSCULAR; SUBCUTANEOUS at 10:07

## 2021-01-01 RX ADMIN — ALLOPURINOL 300 MG: 300 TABLET ORAL at 18:23

## 2021-01-01 RX ADMIN — DILTIAZEM HYDROCHLORIDE 240 MG: 120 CAPSULE, COATED, EXTENDED RELEASE ORAL at 08:12

## 2021-01-01 RX ADMIN — ESCITALOPRAM OXALATE 10 MG: 10 TABLET ORAL at 09:21

## 2021-01-01 RX ADMIN — FAMOTIDINE 20 MG: 20 TABLET, FILM COATED ORAL at 07:58

## 2021-01-01 RX ADMIN — HALOPERIDOL LACTATE 2 MG: 5 INJECTION, SOLUTION INTRAMUSCULAR at 21:24

## 2021-01-01 RX ADMIN — MORPHINE SULFATE 2 MG: 2 INJECTION, SOLUTION INTRAMUSCULAR; INTRAVENOUS at 21:24

## 2021-01-01 RX ADMIN — HALOPERIDOL LACTATE 2 MG: 5 INJECTION, SOLUTION INTRAMUSCULAR at 00:16

## 2021-01-01 RX ADMIN — POTASSIUM CHLORIDE 20 MEQ: 14.9 INJECTION, SOLUTION INTRAVENOUS at 12:23

## 2021-01-01 RX ADMIN — DILTIAZEM HYDROCHLORIDE 240 MG: 120 CAPSULE, COATED, EXTENDED RELEASE ORAL at 09:08

## 2021-01-01 RX ADMIN — MORPHINE SULFATE 2 MG: 2 INJECTION, SOLUTION INTRAMUSCULAR; INTRAVENOUS at 05:19

## 2021-01-01 RX ADMIN — Medication 10 ML: at 18:23

## 2021-01-01 RX ADMIN — ALLOPURINOL 300 MG: 300 TABLET ORAL at 09:14

## 2021-01-01 RX ADMIN — SODIUM CHLORIDE, SODIUM LACTATE, POTASSIUM CHLORIDE, AND CALCIUM CHLORIDE 75 ML/HR: 600; 310; 30; 20 INJECTION, SOLUTION INTRAVENOUS at 06:12

## 2021-01-01 RX ADMIN — SENNOSIDES 8.6 MG: 8.6 TABLET, FILM COATED ORAL at 09:14

## 2021-04-12 NOTE — ED PROVIDER NOTES
Chief complaint : leg pain HISTORY OF PRESENT ILLNESS : 
Location : from right hip radiating down to right knee, and up towards right buttock Quality : aching Quantity : constant since a fall / [address], after tripping on a curb Timing : 3 weeks ago Severity : worsening a few days ago Context : fall, but has been ambulating, started using a friend's walker a few days ago as it worsened Alleviating / exacerbating factors : movement and palpation worsen it Has not tried any nsaids, tylenol, heat, nor cold therapy Associated Symptoms : no numbness, nor bladder sx Past Medical History:  
Diagnosis Date  Arthritis  Coronary atherosclerosis of native coronary artery  Diabetes mellitus type II, controlled ([facility] [address] 2/16/2010  Hypertension  Stable angina (HCC) Past Surgical History:  
Procedure Laterality Date  HX APPENDECTOMY  HX CHOLECYSTECTOMY [address] CATHETERIZATION  2010 Pt. had a heart attack during this procedure [address] Family History:  
Problem Relation Age of Onset  Cancer Mother  Cancer Father  Breast Cancer Neg Hx Social History Socioeconomic History  Marital status:  Spouse name: Not on file  Number of children: Not on file  Years of education: Not on file  Highest education level: Not on file Occupational History  Not on file Social Needs  Financial resource strain: Not on file  Food insecurity Worry: Not on file Inability: Not on file  Transportation needs Medical: Not on file Non-medical: Not on file Tobacco Use  Smoking status: Never Smoker  Smokeless tobacco: Never Used Substance and Sexual Activity  Alcohol use: No  
 Drug use: Not on file  Sexual activity: Not on file Lifestyle  Physical activity Days per week: Not on file Minutes per session: Not on file  Stress: Not on file Relationships  Social connections Talks on phone: Not on file Gets together: Not on file Attends [employer] service: Not on file Active member of club or organization: Not on file Attends meetings of clubs or organizations: Not on file Relationship status: Not on file  Intimate partner violence Fear of current or ex partner: Not on file Emotionally abused: Not on file Physically abused: Not on file Forced sexual activity: Not on file Other Topics Concern  Not on file Social History Narrative  Not on file ALLERGIES: Pcn [penicillins] Review of Systems Vitals:  
 04/12/21 1102 BP: (!) 152/72 Pulse: 65 Resp: 16 Temp: 98.4 °F (36.9 °C) SpO2: 96% Weight: 81.6 kg (180 lb) Height: 5' 4\" (1.626 m) Physical Exam 
Vitals signs and nursing note reviewed. Constitutional:   
   General: She is not in acute distress. Appearance: Normal appearance. She is well-developed. She is not ill-appearing, toxic-appearing or diaphoretic. HENT:  
   Head: Normocephalic and atraumatic. Right Ear: External ear normal.  
   Left Ear: External ear normal.  
   Mouth/Throat:  
   Mouth: Mucous membranes are moist.  
   Pharynx: Oropharynx is clear. No oropharyngeal exudate or posterior oropharyngeal erythema. Eyes:  
   General: No scleral icterus. Right eye: No discharge. Left eye: No discharge. Extraocular Movements: Extraocular movements intact. Conjunctiva/sclera: Conjunctivae normal.  
   Pupils: Pupils are equal, round, and reactive to light. Neck: Musculoskeletal: Normal range of motion and neck supple. Normal range of motion. Thyroid: No thyromegaly. Trachea: Trachea normal.  
Cardiovascular:  
   Rate and Rhythm: Normal rate and regular rhythm. Heart sounds: Normal heart sounds. No murmur. No gallop.    
Pulmonary:  
   Effort: Pulmonary effort is normal. No respiratory distress. Breath sounds: Normal breath sounds. No wheezing or rales. Abdominal:  
   Palpations: Abdomen is soft. There is no hepatomegaly, splenomegaly or pulsatile mass. Tenderness: There is no abdominal tenderness. There is no guarding. Musculoskeletal:     
   General: Tenderness present. Right hip: She exhibits decreased range of motion and tenderness. Lumbar back: She exhibits decreased range of motion and tenderness. Legs: 
 
Lymphadenopathy:  
   Cervical: No cervical adenopathy. Skin: 
   General: Skin is warm and dry. Neurological:  
   General: No focal deficit present. Mental Status: She is alert and oriented to person, place, and time. Mental status is at baseline. Sensory: Sensation is intact. No sensory deficit. Motor: No abnormal muscle tone. Deep Tendon Reflexes:  
   Reflex Scores: 
     Patellar reflexes are 2+ on the right side and 2+ on the left side. Achilles reflexes are 2+ on the right side and 1+ on the left side. Comments: cni 2-12 grossly Psychiatric:     
   Mood and Affect: Mood normal.     
   Behavior: Behavior normal.  
 
  
 
MDM Number of Diagnoses or Management Options Sciatica of right side: new and requires workup Type 2 diabetes mellitus without complication, without long-term current use of insulin ([facility] [address] Diagnosis management comments: Medical decision making note: 
Fall with hip pain, likely sciatica. Back and hip films ok. Pt has NIDDM, and fsbg 215 for ems. Takes metformin 500 bid, and has had difficulty getting fsbg (pcp told her to not worry about finger sticks, and just take her meds) Will check a bmp to look at renal function, before starting nsaids, and bumping up metformin. Lower carb drinking and dieting discussed, especially while on prednisone (lower than usual dose in light of NIDDM) This concludes the \"medical decision making note\" part of this emergency department visit note.  
 
 
  
Amount and/or Complexity of Data Reviewed Clinical lab tests: ordered and reviewed (Results Include: 
 
Recent Results (from the past 24 hour(s)) -METABOLIC PANEL, BASIC Collection Time: 04/12/21 12:27 PM 
     Result                      Value             Ref Range Sodium                      136               136 - 145 mm* Potassium                   3.7               3.5 - 5.1 mm* Chloride                    104               98 - 107 mmo* CO2                         30                21 - 32 mmol* Anion gap                   2 (L)             7 - 16 mmol/L Glucose                     165 (H)           65 - 100 mg/* BUN                         18                8 - 23 MG/DL Creatinine                  1.02 (H)          0.6 - 1.0 MG* 
     GFR est AA                  >60               >60 ml/min/1* GFR est non-AA              55 (L)            >60 ml/min/1* Calcium                     9.3               8.3 - 10.4 M* 
) Tests in the radiology section of CPT®: ordered and reviewed (XR HIP RT W OR WO PELV 2-3 VWS Final Result Unremarkable exam. 
   
  Lumbar spine: AP and lateral views of the lumbar spine were obtained. Comparison: 03/20/2015 Findings:  The vertebral body heights are well-maintained. There is slight 
  anterolisthesis of L4, unchanged. There is advanced disc space narrowing at L5-S1, unchanged There is moderate L5 3-4 through L5-S1 facet arthropathy 
  without significant change. There is no evidence of acute fracture. There is no 
  bony destruction. IMPRESSION: Stable lower lumbar spondylosis without evidence of acute bony 
  abnormality. XR SPINE LUMB 2 OR 3 V Final Result Unremarkable exam. 
   
  Lumbar spine: AP and lateral views of the lumbar spine were obtained. Comparison: 03/20/2015 Findings:  The vertebral body heights are well-maintained.  There is slight 
  anterolisthesis of L4, unchanged. There is advanced disc space narrowing at L5-S1, unchanged There is moderate L5 3-4 through L5-S1 facet arthropathy 
  without significant change. There is no evidence of acute fracture. There is no bony destruction. IMPRESSION: Stable lower lumbar spondylosis without evidence of acute bony abnormality. ) Decide to obtain previous medical records or to obtain history from someone other than the patient: yes (a1c 6.9 on 12/9/20) Obtain history from someone other than the patient: yes (daughter) Independent visualization of images, tracings, or specimens: yes (Kidney function today looks great) Risk of Complications, Morbidity, and/or Mortality Presenting problems: moderate Diagnostic procedures: minimal 
Management options: low Patient Progress Patient progress: improved Procedures

## 2021-04-12 NOTE — ED NOTES
I have reviewed discharge instructions with the patient. The patient verbalized understanding. Patient left ED via Discharge Method: wheelchair to Home with transport from family. The patient has been wheeled to car via nurse and appears in no acute distress. The patient has been provided discharge instructions, prescriptions x 4. Opportunity for questions and clarification provided. Patient given 4 scripts. To continue your aftercare when you leave the hospital, you may receive an automated call from our care team to check in on how you are doing. This is a free service and part of our promise to provide the best care and service to meet your aftercare needs.  If you have questions, or wish to unsubscribe from this service please call [phone]. Thank you for Choosing our [facility] Emergency Department.

## 2021-04-12 NOTE — ED TRIAGE NOTES
Patient arrives via [facility] from home. Patient had fall 3 weeks ago and over past several days has had worse right hip pain. Ambulatory on scene for EMS. No shortening or rotation observed. bgl 215.

## 2021-05-21 PROBLEM — E83.52 HYPERCALCEMIA: Status: ACTIVE | Noted: 2021-01-01

## 2021-05-22 PROBLEM — N17.0 ACUTE RENAL FAILURE WITH TUBULAR NECROSIS (HCC): Status: ACTIVE | Noted: 2021-01-01

## 2021-05-22 PROBLEM — E87.6 HYPOKALEMIA: Status: ACTIVE | Noted: 2021-01-01

## 2021-05-22 PROBLEM — G93.40 ACUTE ENCEPHALOPATHY: Status: ACTIVE | Noted: 2021-01-01

## 2021-05-22 NOTE — PROGRESS NOTES
Pt complaining of being restless and unable to sleep. Dr. Nico Summers aware and order Ambien 5 mg po x1 dose. Will give once pharmacy replies.

## 2021-05-22 NOTE — PROGRESS NOTES
05/22/21 0109 Dual Skin Pressure Injury Assessment Dual Skin Pressure Injury Assessment WDL Second Care Provider (Based on [address] [place] [address]) Ilana Carpio, RN Skin warm, dry, and intact. Blanchable redness to sacrum and barrier cream applied.

## 2021-05-22 NOTE — PROGRESS NOTES
Hospitalist Progress Note 2021 Admit Date: 2021  8:22 PM  
NAME: Nic Walls :  1940 MRN:  [id number] Attending: Lloyd Barber MD 
PCP:  Bobby Woods MD 
 
SUBJECTIVE:  
 
Nic Walls is a [de-identified]years old female with h/o CAD, HTN, HLD, admitted on  for acute encephalopathy secondary to hypercalcemia which is strongly suspected to be from underlying multiple myeloma based on radiographic evidence of translucent skull lesions on CT head. Pt also had acute renal failure with creatinine of 1.5. pt has suppressed PTH intact and vitamin D levels. : 
Patient is currently resting in bed, appears to be lethargic and somnolent. Will open eyes to name but will fall asleep right away. Patient's son is present at bedside, discussed about possible concerns for multiple myeloma. He reports that patient lives by herself and has been falling lately has been confused. No fever since admission. No nausea, vomiting, diarrhea. Review of Systems negative with exception of pertinent positives noted above PHYSICAL EXAM  
 
 
Visit Vitals BP (!) 158/76 (BP 1 Location: Left upper arm, BP Patient Position: At rest) Pulse 81 Temp 98.7 °F (37.1 °C) Resp 18 Ht 5' 4\" (1.626 m) Wt 81.6 kg (180 lb) SpO2 96% BMI 30.90 kg/m² Temp (24hrs), Av.9 °F (37.2 °C), Min:98.6 °F (37 °C), Max:99.5 °F (37.5 °C) Oxygen Therapy O2 Sat (%): 96 % (21 0417) Pulse via Oximetry: 70 beats per minute (21 2329) O2 Device: None (Room air) (21) No intake or output data in the 24 hours ending 21 0706 General: Elderly, NAD, somnolent, lethargic, on room air Head:  Atraumatic Normocephalic. Eyes:  PERRLA, EOMI, Anicteric. ENT:  No discharges/lesions. Lungs:  CTA Bilaterally. CVS:  Regular rate and rhythm,  No murmur, rub, or gallop, No JVD, No lower   extremity edema. Abdomen: Soft, Non distended, Non tender, Positive bowel sounds.  
MSK: No deformities, lesions, Spontaneously moves extremities. Neurologic:  Opening eyes to name with very lethargic and somnolent. Moving all 4 extremities spontaneously and intermittently following commands. Psychiatry:      Unable to assess. Skin:   No rash/lesions. Good skin turgor Heme/Lymph/Immune:  No petechiae, ecchymoses, overt signs of bleeding or    lymphadenopathy noted. Recent Results (from the past 24 hour(s)) EKG, 12 LEAD, INITIAL Collection Time: 05/21/21  8:35 PM  
Result Value Ref Range Ventricular Rate 73 BPM  
 Atrial Rate 202 BPM  
 QRS Duration 92 ms Q-T Interval 366 ms  
 QTC Calculation (Bezet) 403 ms Calculated R Axis 75 degrees Calculated T Axis 38 degrees Diagnosis    
  !! AGE AND GENDER SPECIFIC ECG ANALYSIS !! Normal sinus rhythm Nonspecific ST and T wave abnormality Abnormal ECG When compared with ECG of 17-FEB-2010 06:42, 
Junctional rhythm has replaced Sinus rhythm Confirmed by [facility]  MD ()Judi ([id number]) on 5/21/2021 8:56:19 PM 
  
PTH INTACT Collection Time: 05/21/21  8:45 PM  
Result Value Ref Range Calcium 13.8 (HH) 8.3 - 10.4 MG/DL  
 PTH, Intact <6.3 (L) 18.5 - 88.0 pg/mL VITAMIN D, 25 HYDROXY Collection Time: 05/21/21  8:45 PM  
Result Value Ref Range Vitamin D 25-Hydroxy 12.1 (L) 30.0 - 100.0 ng/mL TSH 3RD GENERATION Collection Time: 05/21/21  8:45 PM  
Result Value Ref Range TSH 3.440 0.358 - 3.740 uIU/mL CBC WITH AUTOMATED DIFF Collection Time: 05/21/21  8:46 PM  
Result Value Ref Range WBC 5.9 4.3 - 11.1 K/uL  
 RBC 3.35 (L) 4.05 - 5.2 M/uL  
 HGB 10.6 (L) 11.7 - 15.4 g/dL HCT 30.6 (L) 35.8 - 46.3 % MCV 91.3 79.6 - 97.8 FL  
 MCH 31.6 26.1 - 32.9 PG  
 MCHC 34.6 31.4 - 35.0 g/dL  
 RDW 13.3 11.9 - 14.6 % PLATELET 784 (L) 284 - 450 K/uL MPV 10.4 9.4 - 12.3 FL ABSOLUTE NRBC 0.00 0.0 - 0.2 K/uL  
 DF AUTOMATED NEUTROPHILS 69 43 - 78 % LYMPHOCYTES 20 13 - 44 %  MONOCYTES 9 4.0 - 12.0 %  
 EOSINOPHILS 0 (L) 0.5 - 7.8 % BASOPHILS 1 0.0 - 2.0 % IMMATURE GRANULOCYTES 1 0.0 - 5.0 %  
 ABS. NEUTROPHILS 4.1 1.7 - 8.2 K/UL  
 ABS. LYMPHOCYTES 1.2 0.5 - 4.6 K/UL  
 ABS. MONOCYTES 0.6 0.1 - 1.3 K/UL  
 ABS. EOSINOPHILS 0.0 0.0 - 0.8 K/UL  
 ABS. BASOPHILS 0.0 0.0 - 0.2 K/UL  
 ABS. IMM. GRANS. 0.1 0.0 - 0.5 K/UL METABOLIC PANEL, COMPREHENSIVE Collection Time: 05/21/21  8:46 PM  
Result Value Ref Range Sodium 141 136 - 145 mmol/L Potassium 3.4 (L) 3.5 - 5.1 mmol/L Chloride 105 98 - 107 mmol/L  
 CO2 30 21 - 32 mmol/L Anion gap 6 (L) 7 - 16 mmol/L Glucose 130 (H) 65 - 100 mg/dL BUN 26 (H) 8 - 23 MG/DL Creatinine 1.50 (H) 0.6 - 1.0 MG/DL  
 GFR est AA 43 (L) >60 ml/min/1.73m2 GFR est non-AA 36 (L) >60 ml/min/1.73m2 Calcium 13.7 (HH) 8.3 - 10.4 MG/DL Bilirubin, total 1.2 (H) 0.2 - 1.1 MG/DL  
 ALT (SGPT) 19 12 - 65 U/L  
 AST (SGOT) 42 (H) 15 - 37 U/L Alk. phosphatase 85 50 - 136 U/L Protein, total 6.5 6.3 - 8.2 g/dL Albumin 3.5 3.2 - 4.6 g/dL Globulin 3.0 2.3 - 3.5 g/dL A-G Ratio 1.2 1.2 - 3.5 CK Collection Time: 05/21/21  8:46 PM  
Result Value Ref Range  (H) 21 - 215 U/L  
URINALYSIS W/ RFLX MICROSCOPIC Collection Time: 05/21/21  9:08 PM  
Result Value Ref Range Color YELLOW Appearance CLOUDY Specific gravity 1.021 1.001 - 1.023    
 pH (UA) 5.0 5.0 - 9.0 Protein TRACE (A) NEG mg/dL Glucose Negative mg/dL Ketone TRACE (A) NEG mg/dL Bilirubin SMALL (A) NEG Blood TRACE (A) NEG Urobilinogen 1.0 0.2 - 1.0 EU/dL Nitrites Negative NEG Leukocyte Esterase Negative NEG    
 WBC 3-5 0 /hpf  
 RBC 0-3 0 /hpf Epithelial cells 0-3 0 /hpf Bacteria 0 0 /hpf Casts 0-3 0 /lpf Crystals, urine OCCASIONAL 0 /LPF Other observations RESULTS VERIFIED MANUALLY METABOLIC PANEL, COMPREHENSIVE Collection Time: 05/22/21  5:50 AM  
Result Value Ref Range Sodium 143 136 - 145 mmol/L  Potassium 3.2 (L) 3.5 - 5.1 mmol/L Chloride 106 98 - 107 mmol/L  
 CO2 29 21 - 32 mmol/L Anion gap 8 7 - 16 mmol/L Glucose 103 (H) 65 - 100 mg/dL BUN 24 (H) 8 - 23 MG/DL Creatinine 1.42 (H) 0.6 - 1.0 MG/DL  
 GFR est AA 46 (L) >60 ml/min/1.73m2 GFR est non-AA 38 (L) >60 ml/min/1.73m2 Calcium 13.5 (HH) 8.3 - 10.4 MG/DL Bilirubin, total 1.2 (H) 0.2 - 1.1 MG/DL  
 ALT (SGPT) 17 12 - 65 U/L  
 AST (SGOT) 44 (H) 15 - 37 U/L Alk. phosphatase 81 50 - 136 U/L Protein, total 5.9 (L) 6.3 - 8.2 g/dL Albumin 3.1 (L) 3.2 - 4.6 g/dL Globulin 2.8 2.3 - 3.5 g/dL A-G Ratio 1.1 (L) 1.2 - 3.5 MAGNESIUM Collection Time: 05/22/21  5:50 AM  
Result Value Ref Range Magnesium 1.5 (L) 1.8 - 2.4 mg/dL CBC WITH AUTOMATED DIFF Collection Time: 05/22/21  5:50 AM  
Result Value Ref Range WBC 5.4 4.3 - 11.1 K/uL  
 RBC 3.11 (L) 4.05 - 5.2 M/uL HGB 9.8 (L) 11.7 - 15.4 g/dL HCT 28.5 (L) 35.8 - 46.3 % MCV 91.6 79.6 - 97.8 FL  
 MCH 31.5 26.1 - 32.9 PG  
 MCHC 34.4 31.4 - 35.0 g/dL  
 RDW 13.3 11.9 - 14.6 % PLATELET 695 (L) 553 - 450 K/uL MPV 10.3 9.4 - 12.3 FL ABSOLUTE NRBC 0.00 0.0 - 0.2 K/uL  
 DF AUTOMATED NEUTROPHILS 65 43 - 78 % LYMPHOCYTES 22 13 - 44 % MONOCYTES 11 4.0 - 12.0 % EOSINOPHILS 0 (L) 0.5 - 7.8 % BASOPHILS 1 0.0 - 2.0 % IMMATURE GRANULOCYTES 1 0.0 - 5.0 %  
 ABS. NEUTROPHILS 3.5 1.7 - 8.2 K/UL  
 ABS. LYMPHOCYTES 1.2 0.5 - 4.6 K/UL  
 ABS. MONOCYTES 0.6 0.1 - 1.3 K/UL  
 ABS. EOSINOPHILS 0.0 0.0 - 0.8 K/UL  
 ABS. BASOPHILS 0.0 0.0 - 0.2 K/UL  
 ABS. IMM. GRANS. 0.1 0.0 - 0.5 K/UL Imaging Betsy Miller All diagnostic imaging personally reviewed by me. EXAM: Noncontrast CT head. 
  
INDICATION: Pain, fall injury. 
  
COMPARISON: None. 
  
TECHNIQUE: Noncontrast CT images of the head were obtained. Radiation dose 
reduction techniques were used for this study.   Our CT scanners use one or all 
of the following:  Automated exposure control, adjustment of the mA or kV 
according to patient size, iterative reconstruction. 
  
FINDINGS: There is mild volume loss and chronic small vessel ischemic changes in 
the white matter. No acute infarct, hemorrhage or mass is identified. There is 
no mass effect, midline shift or depressed fracture. The visualized paranasal 
sinuses and mastoid air cells are clear. There are multiple subcentimeter lucent 
lesions in both sides of the skull. 
  
IMPRESSION 1. No acute intracranial process. 2. Multiple small lucent lesions in the skull, without evidence of extraosseous 
extension. Differential considerations would include metastatic disease and 
multiple myeloma. ASSESSMENT Hospital Problems as of 5/22/2021 Date Reviewed: 6/15/2020 Codes Class Noted - Resolved POA Acute encephalopathy ICD-10-CM: G93.40 ICD-9-CM: 348.30  5/22/2021 - Present Unknown Hypokalemia ICD-10-CM: E87.6 ICD-9-CM: 276.8  5/22/2021 - Present Unknown Acute renal failure with tubular necrosis (HCC) ICD-10-CM: N17.0 ICD-9-CM: 584.5  5/22/2021 - Present Unknown * (Principal) Hypercalcemia ICD-10-CM: A98.19 
ICD-9-CM: 275.42  5/21/2021 - Present Unknown [facility] II, [facility] ICD-10-CM: E11.9 ICD-9-CM: 250.00  2/16/2010 - Present Yes Dyslipidemia ICD-10-CM: E78.5 ICD-9-CM: 272.4  2/16/2010 - Present Yes Essential hypertension, benign ICD-10-CM: I10 
ICD-9-CM: 401.1  2/16/2010 - Present Yes Plan: #Acute encephalopathy: 
Secondary to dehydration and hypercalcemia 5/22: 
Patient is still confused, somnolent and encephalopathic Calcium level is still elevated Continue correcting the underlying etiology of which is hypercalcemia and dehydration. No underlying infectious etiology suspected currently. #Hypercalcemia: 
5/22: 
Corrected calcium of 13.8 Currently on IV fluids. Ordered for 4 doses of calcitonin and 1 dose of Zometa.  
Patient was on HCTZ as outpatient that could have also contributed to hypercalcemia however given the radiographic evidence of multiple lytic lesion in her skull multiple myeloma is suspected. Oncology is on board, plan for bone marrow biopsy sometime next week. Follow-up with SPEP and serum free light chains. Ordered for skeletal survey and urine protein and PEP #Acute renal failure: 
5/22: Baseline is normal 
Currently creatinine is gradually trending down from 1.5-1.4. Plan to continue IV fluids. #Hypokalemia: 3.2 
5/22: 
Patient is currently on oral KCl 40 mEq twice daily for 4 doses. #Hypomagnesemia: 1.5 
5/22: Order for 1 dose IV magnesium sulfate 2 g #HTN: 
Continue home medication diltiazem CD. BP is optimally controlled, continue to monitor. #Anxiety depression: 
Continue Lexapro 10 mg p.o. daily #GI prophylaxis with Pepcid #CAD: 
Continue aspirin and Effient with Pravachol. #DVT prophylaxis with Lovenox PT and OT eval 
 
Disposition: Pending until medically stable Currently full code Lois Santos MD

## 2021-05-22 NOTE — ED PROVIDER NOTES
Patient is an 60-year-old female with a history of hypertension, diabetes, heart disease and chronic back pain comes to the emergency department today by EMS. Patient states yesterday evening she was in her kitchen using her walker her leg \"gave out\" and she fell. She was unable to get up and laid on the floor all day. Family could not reach her today son came to check on her found her on the floor at 11 AM put her back in bed. She was unable to get up all day EMS was called. She complains of pain in her low back and bilateral knees. She does seem a bit confused. She is not sure if she lost consciousness. The history is provided by the patient. Fall The accident occurred yesterday. The fall occurred while walking. She fell from a height of ground level. She landed on hard floor. The pain is present in the left knee and right knee (Low back). The pain is moderate. She was not ambulatory at the scene. There was no entrapment after the fall. There was no drug use involved in the accident. There was no alcohol use involved in the accident. Pertinent negatives include no fever, no numbness, no abdominal pain, no nausea, no vomiting, no headaches, no extremity weakness and no laceration. The risk factors include being elderly. The symptoms are aggravated by pressure on injury. She has tried nothing for the symptoms. Past Medical History:  
Diagnosis Date  Arthritis  Coronary atherosclerosis of native coronary artery  Diabetes mellitus type II, controlled ([facility] [address] 2/16/2010  Hypertension  Stable angina (HCC) Past Surgical History:  
Procedure Laterality Date  HX APPENDECTOMY  HX CHOLECYSTECTOMY [address] CATHETERIZATION  2010 Pt. had a heart attack during this procedure [address] Family History:  
Problem Relation Age of Onset  Cancer Mother  Cancer Father  Breast Cancer Neg Hx Social History Socioeconomic History  Marital status:  Spouse name: Not on file  Number of children: Not on file  Years of education: Not on file  Highest education level: Not on file Occupational History  Not on file Tobacco Use  Smoking status: Never Smoker  Smokeless tobacco: Never Used Substance and Sexual Activity  Alcohol use: No  
 Drug use: Not on file  Sexual activity: Not on file Other Topics Concern  Not on file Social History Narrative  Not on file Social Determinants of Health Financial Resource Strain:  Difficulty of Paying Living Expenses:   
Food Insecurity:  Worried About [address] in the Last Year:   
[address] in the Last Year:   
Transportation Needs:   
 Lack of Transportation (Medical):  Lack of Transportation (Non-Medical): Physical Activity:   
 Days of Exercise per Week:  Minutes of Exercise per Session:   
Stress:  Feeling of Stress :   
Social Connections:  Frequency of Communication with Friends and Family:  Frequency of Social Gatherings with Friends and Family:  Attends Faith Services:  Active Member of Clubs or Organizations:  Attends Club or Organization Meetings:  Marital Status: Intimate Partner Violence:  Fear of Current or Ex-Partner:  Emotionally Abused:   
 Physically Abused:   
 Sexually Abused: ALLERGIES: Pcn [penicillins] Review of Systems Constitutional: Negative for chills, fatigue and fever. HENT: Negative for congestion, rhinorrhea and sore throat. Eyes: Negative for pain, discharge and visual disturbance. Respiratory: Negative for cough and shortness of breath. Cardiovascular: Negative for chest pain and palpitations. Gastrointestinal: Negative for abdominal pain, diarrhea, nausea and vomiting. Endocrine: Negative for polydipsia and polyuria. Genitourinary: Negative for dysuria, frequency and urgency. Musculoskeletal: Negative for back pain, extremity weakness and neck pain. Skin: Negative for rash. Neurological: Negative for seizures, syncope, speech difficulty, weakness, numbness and headaches. Hematological: Negative. Psychiatric/Behavioral: Positive for confusion. Vitals:  
 05/21/21 2027 BP: (!) 156/72 Pulse: 73 Resp: 16 Temp: 98.6 °F (37 °C) SpO2: 97% Weight: 81.6 kg (180 lb) Height: 5' 4\" (1.626 m) Physical Exam 
Vitals and nursing note reviewed. Constitutional:   
   Appearance: Normal appearance. She is well-developed. HENT:  
   Head: Normocephalic and atraumatic. Eyes:  
   Conjunctiva/sclera: Conjunctivae normal.  
   Pupils: Pupils are equal, round, and reactive to light. Cardiovascular:  
   Rate and Rhythm: Normal rate and regular rhythm. Pulses: Normal pulses. Pulmonary:  
   Effort: Pulmonary effort is normal.  
   Breath sounds: Normal breath sounds. Abdominal:  
   Palpations: Abdomen is soft. Tenderness: There is no abdominal tenderness. There is no guarding or rebound. Musculoskeletal:     
   General: Tenderness present. No deformity or signs of injury. Normal range of motion. Cervical back: Normal range of motion and neck supple. Comments: Tender over the lumbar spine and bilateral knees. Lymphadenopathy:  
   Cervical: No cervical adenopathy. Skin: 
   General: Skin is warm and dry. Capillary Refill: Capillary refill takes less than 2 seconds. Coloration: Skin is pale. Findings: No laceration or rash. Neurological:  
   General: No focal deficit present. Mental Status: She is alert. She is confused. GCS: GCS eye subscore is 4. GCS verbal subscore is 5. GCS motor subscore is 6. Cranial Nerves: No cranial nerve deficit, dysarthria or facial asymmetry. Sensory: Sensation is intact. No sensory deficit. Motor: Tremor present. No seizure activity or pronator drift. Coordination: Coordination is intact. Comments: Diffusely weak but no obvious focal deficits. MDM Number of Diagnoses or Management Options Diagnosis management comments: I wore appropriate PPE throughout this patient's ED visit. Dylan Salomon MD, 9:11 PM 
 
=============================================== 
ED EKG Interpretation EKG was interpreted in the absence of a cardiologist. 
 
EKG rhythm: Normal sinus rhythm Rate: 73 ST Segments: Normal ST segments - NO STEMI Dylan Salomon MD; 5/21/2021 @9:11 PM================ 
 
CT HEAD WO CONT Final Result 1. No acute intracranial process. 2. Multiple small lucent lesions in the skull, without evidence of extraosseous 
  extension. Differential considerations would include metastatic disease and 
  multiple myeloma. XR KNEE RT 3 V Final Result No acute process. XR KNEE LT 3 V Final Result No acute process. XR SPINE LUMB 2 OR 3 V Final Result No acute process. XR PELV AP ONLY Final Result No acute process. XR CHEST PA LAT Final Result No acute process. 9:59 PM 
urinalysis negative for infection CK minimally elevated 340 She is dehydrated with mild acute kidney injury White count normal 
Calcium markedly elevated at 13 Concern for possible metastatic disease with the lucencies noted on the skull. Patient has no known history of cancer. Given her diffuse weakness unable to ambulate, dehydration, hyperkalemia, and concern for cancer I will consult with the hospitalist service for admission and further work-up. Voice dictation software was used during the making of this note. This software is not perfect and grammatical and other typographical errors may be present. This note has been proofread, but may still contain errors. Dylan Salomon MD; 5/21/2021 @10:02 PM  
=================================================================== Amount and/or Complexity of Data Reviewed Clinical lab tests: ordered and reviewed Tests in the medicine section of CPT®: ordered and reviewed Review and summarize past medical records: yes Discuss the patient with other providers: yes Independent visualization of images, tracings, or specimens: yes Risk of Complications, Morbidity, and/or Mortality Presenting problems: moderate Diagnostic procedures: low Management options: low Patient Progress Patient progress: stable Procedures

## 2021-05-22 NOTE — PROGRESS NOTES
Hourly rounding completed on this shift. All needs met. Pt able to rest after Ambien given. Pt is currently resting in bed. 24 hour urine started at 0130 after cook placement. Urine on ice at bedside. Contacted Dr. Rob Marin at bedside and reported pt halving loose stools prior to admission. No stools on this shift. Waiting on response. Will continue to monitor and give report to oncoming nurse. Update: Dr. Rob Marin does not want order to test for C. Diff until stool can be assessed as loose/ watery.

## 2021-05-22 NOTE — PROGRESS NOTES
PT findings this afternoon are consistent with internist.   Patient is currently lying in bed, appears to be lethargic and somnolent. Will open eyes to name but will fall asleep right away. PT evaluation deferred until more aware and awake, will check back as able with patient more appropriate. Lovett Harada, PT, DPT, OCS.

## 2021-05-22 NOTE — ACP (ADVANCE CARE PLANNING)
Advance care planninginitial encounter Face to face time - 16 minutes face-to-face time spent discussion the care Pt's decision making capacity  
[] Yes [x ] NO Names of POA/surrogate decision maker when patient is altered 
:Daniel Nina Other members present in the meeting : Daniel Nina Patient / surrogate decision-maker ultimately chose. .. [x] Full code- full aggressive medical and surgical interventions, including intubations, resuscitations, pressors, artificial tube feeding [ ] DO NOT RESUSCITATE -okay to intubate,  and other aggressive medical and surgical intervention [ ] Not resuscitate, DO NOT INTUBATE, but okay for other aggressive medical and surgical intervention [ ] DNR/DNI, hospice, comfort focus care to maximize patient's quality of life [ ] DNR/DNI, strict comfort care ONLY Further discussion regarding principle disease process. prognosis, plans of care, and treatment goals 
: Findings suggestive of possible cancer explained, all questions answered, patient's goals of care is full code for aggressive treatments at this time, power of  at bedside son agreed with patient's, all questions answered

## 2021-05-22 NOTE — CONSULTS
HEMATOLOGY/ MEDICAL ONCOLOGY CONSULTATION Patient Name: Abel Cummins    Date of Consult: 2021 : 1940  Age:80 y.o. ZGT:[id number] Reason for Consultation: Ms. Silvio Marc is a [de-identified] y.o. female admitted on 2021 with a primary diagnosis of The primary encounter diagnosis was Hypercalcemia. Diagnoses of Dehydration and Weakness were also pertinent to this visit. Pablo Borges History of Present Illness: We are asked to see Benjamin Lynne for evaluation of hypercalcemia. She has a history of hypertensions with limited data in the [facility] system. A CMP from [facility] one year ago showed no hypercalcemia. She was seen in our ED after a fall in April and had a normal calcium at that time with a creatinine of 1.02. Subsequent to this fall, she has had some back pain, but also become progressively more listless and anorexic especially over the past four days. This became associated with some mild confusions prompting medical evaluation. She was found to be hypercalcemic and with worsening renal function and was admitted. She was taking HCTZ as an outpatient. CT scanning of her head to evaluate her encephalopathy was notable for normal brain but multiple lytic lesions in the calvarium. Medications:  
Current Facility-Administered Medications Medication Dose Route Frequency Provider Last Rate Last Admin  aspirin chewable tablet 81 mg  81 mg Oral DAILY , DO   81 mg at 21 0809  
 dilTIAZem ER (CARDIZEM CD) capsule 240 mg  240 mg Oral DAILY , DO   240 mg at 21 2593  escitalopram oxalate (LEXAPRO) tablet 10 mg  10 mg Oral DAILY , DO   10 mg at 21 3288  prasugreL (EFFIENT) tablet 10 mg  10 mg Oral DAILY , DO   10 mg at 21 0900  pravastatin (PRAVACHOL) tablet 40 mg  40 mg Oral Q24H , DO      
 sodium chloride (NS) flush 5-40 mL  5-40 mL IntraVENous Q8H , DO   10 mL at 21 9661  sodium chloride (NS) flush 5-40 mL  5-40 mL IntraVENous PRN Ardath Deutscher, DO      
 acetaminophen (TYLENOL) tablet 650 mg  650 mg Oral Q6H PRN Ardath Deutscher, DO Or  
 acetaminophen (TYLENOL) suppository 650 mg  650 mg Rectal Q6H PRN Ardath Deutscher, DO      
 polyethylene glycol (MIRALAX) packet 17 g  17 g Oral DAILY Ardath Deutscher, DO   17 g at 05/22/21 0809  
 senna-docusate (PERICOLACE) 8.6-50 mg per tablet 1 Tablet  1 Tablet Oral BID Ardath Deutscher, DO   1 Tablet at 05/22/21 0810  
 magnesium hydroxide (MILK OF MAGNESIA) 400 mg/5 mL oral suspension 30 mL  30 mL Oral DAILY PRN Ardath Deutscher, DO      
 bisacodyL (DULCOLAX) suppository 10 mg  10 mg Rectal DAILY PRN Ardath Deutscher, DO      
 ondansetron (ZOFRAN ODT) tablet 4 mg  4 mg Oral Q8H PRN Ardath Deutscher, DO Or  
 ondansetron (ZOFRAN) injection 4 mg  4 mg IntraVENous Q6H PRN Ardath Deutscher, DO   4 mg at 05/22/21 1038  famotidine (PEPCID) tablet 20 mg  20 mg Oral DAILY Ardath Deutscher, DO   20 mg at 05/22/21 3048  enoxaparin (LOVENOX) injection 40 mg  40 mg SubCUTAneous DAILY Ardath Deutscher, DO   40 mg at 05/22/21 1890  potassium chloride (K-DUR, KLOR-CON) SR tablet 40 mEq  40 mEq Oral BID Eulalia Holcomb MD   40 mEq at 05/22/21 4445  calciTONIN (MIACALCIN) injection 300 Int'l Units  300 Int'l Units IntraMUSCular Q12H Eulalia Holcomb MD   300 Int'l Units at 05/22/21 1007  lactated Ringers infusion  125 mL/hr IntraVENous CONTINUOUS Eulalia Holcomb  mL/hr at 05/22/21 0812 125 mL/hr at 05/22/21 4561 Allergies: Allergies Allergen Reactions  Pcn [Penicillins] Rash Review of Systems: The Review of Systems is documented in full in the internal medical record. All systems are negative other than for those noted above. Past Medical History: 
Past Medical History:  
Diagnosis Date  Arthritis  Coronary atherosclerosis of native coronary artery  [facility]2010  Hypertension  St[facility] angina ([facility])  Stable angina (HCC) Past Surgical History: 
Past Surgical History:  
Procedure Laterality Date  HX APPENDECTOMY  HX CHOLECYSTECTOMY [address] CATHETERIZATION  2010 Pt. had a heart attack during this procedure [address] [facility] Social History: 
Social History Tobacco Use  Smoking status: Never Smoker  Smokeless tobacco: Never Used Substance Use Topics  Alcohol use: No  
 Drug use: Not on file Family History: 
Family History Problem Relation Age of Onset  Cancer Mother  Cancer Father  Breast Cancer Neg Hx Physical Examination: 
General Appearance: Mildly ill appearing and somewhat somnolent  patient in no acute distress. Vital signs:  
Visit Vitals BP (!) 176/78 (BP 1 Location: Left upper arm, BP Patient Position: Lying) Pulse 77 Temp 98.3 °F (36.8 °C) Resp 18 Ht 5' 4\" (1.626 m) Wt 180 lb (81.6 kg) SpO2 96% BMI 30.90 kg/m² HEENT: No oral exam performed. No nodular skull lesions. Neck: Supple. There is no thyromegaly. Lymph nodes: There is no cervical, supraclavicular, axillary or inguinal adenopathy. Breasts: There are no palpable masses, nipple discharge or skin retraction. Lungs: The lungs are clear to auscultation and percussion. There is no egophony. There is no chest wall tenderness and no  use of accessory respiratory musculature. Heart: There is no jugular venous distention. The rate is normal and rhythm regular. The S1 and S2 are normal and there are no murmurs or rubs. Abdomen: Soft, non-tender, bowel sounds present and normal, no appreciated hepatosplenomegaly. No palpable masses. Skin: No rash, petechiae or ecchymoses. No evidence of malignancy. Musculoskeletal: No bony or muscular tenderness. No joint effusions Extremities: No cyanosis, clubbing or edema. Neurologic: Sleepy but coherent. Moves all extremities Labs: 
 
Recent Results (from the past 24 hour(s)) EKG, 12 LEAD, INITIAL Collection Time: 05/21/21  8:35 PM  
Result Value Ref Range Ventricular Rate 73 BPM  
 Atrial Rate 202 BPM  
 QRS Duration 92 ms Q-T Interval 366 ms  
 QTC Calculation (Bezet) 403 ms Calculated R Axis 75 degrees Calculated T Axis 38 degrees Diagnosis    
  !! AGE AND GENDER SPECIFIC ECG ANALYSIS !! Normal sinus rhythm Nonspecific ST and T wave abnormality Abnormal ECG When compared with ECG of 17-FEB-2010 06:42, 
Junctional rhythm has replaced Sinus rhythm Confirmed by Allyson Rust MD (), Jose Jeffries ([id number]) on 5/21/2021 8:56:19 PM 
  
PTH INTACT Collection Time: 05/21/21  8:45 PM  
Result Value Ref Range Calcium 13.8 (HH) 8.3 - 10.4 MG/DL  
 PTH, Intact <6.3 (L) 18.5 - 88.0 pg/mL VITAMIN D, 25 HYDROXY Collection Time: 05/21/21  8:45 PM  
Result Value Ref Range Vitamin D 25-Hydroxy 12.1 (L) 30.0 - 100.0 ng/mL TSH 3RD GENERATION Collection Time: 05/21/21  8:45 PM  
Result Value Ref Range TSH 3.440 0.358 - 3.740 uIU/mL CBC WITH AUTOMATED DIFF Collection Time: 05/21/21  8:46 PM  
Result Value Ref Range WBC 5.9 4.3 - 11.1 K/uL  
 RBC 3.35 (L) 4.05 - 5.2 M/uL  
 HGB 10.6 (L) 11.7 - 15.4 g/dL HCT 30.6 (L) 35.8 - 46.3 % MCV 91.3 79.6 - 97.8 FL  
 MCH 31.6 26.1 - 32.9 PG  
 MCHC 34.6 31.4 - 35.0 g/dL  
 RDW 13.3 11.9 - 14.6 % PLATELET 477 (L) 545 - 450 K/uL MPV 10.4 9.4 - 12.3 FL ABSOLUTE NRBC 0.00 0.0 - 0.2 K/uL  
 DF AUTOMATED NEUTROPHILS 69 43 - 78 % LYMPHOCYTES 20 13 - 44 % MONOCYTES 9 4.0 - 12.0 % EOSINOPHILS 0 (L) 0.5 - 7.8 % BASOPHILS 1 0.0 - 2.0 % IMMATURE GRANULOCYTES 1 0.0 - 5.0 %  
 ABS. NEUTROPHILS 4.1 1.7 - 8.2 K/UL  
 ABS. LYMPHOCYTES 1.2 0.5 - 4.6 K/UL  
 ABS. MONOCYTES 0.6 0.1 - 1.3 K/UL  
 ABS. EOSINOPHILS 0.0 0.0 - 0.8 K/UL  
 ABS. BASOPHILS 0.0 0.0 - 0.2 K/UL  
 ABS. IMM. GRANS. 0.1 0.0 - 0.5 K/UL METABOLIC PANEL, COMPREHENSIVE  Collection Time: 05/21/21  8:46 PM  
Result Value Ref Range Sodium 141 136 - 145 mmol/L Potassium 3.4 (L) 3.5 - 5.1 mmol/L Chloride 105 98 - 107 mmol/L  
 CO2 30 21 - 32 mmol/L Anion gap 6 (L) 7 - 16 mmol/L Glucose 130 (H) 65 - 100 mg/dL BUN 26 (H) 8 - 23 MG/DL Creatinine 1.50 (H) 0.6 - 1.0 MG/DL  
 GFR est AA 43 (L) >60 ml/min/1.73m2 GFR est non-AA 36 (L) >60 ml/min/1.73m2 Calcium 13.7 (HH) 8.3 - 10.4 MG/DL Bilirubin, total 1.2 (H) 0.2 - 1.1 MG/DL  
 ALT (SGPT) 19 12 - 65 U/L  
 AST (SGOT) 42 (H) 15 - 37 U/L Alk. phosphatase 85 50 - 136 U/L Protein, total 6.5 6.3 - 8.2 g/dL Albumin 3.5 3.2 - 4.6 g/dL Globulin 3.0 2.3 - 3.5 g/dL A-G Ratio 1.2 1.2 - 3.5 CK Collection Time: 05/21/21  8:46 PM  
Result Value Ref Range  (H) 21 - 215 U/L  
URINALYSIS W/ RFLX MICROSCOPIC Collection Time: 05/21/21  9:08 PM  
Result Value Ref Range Color YELLOW Appearance CLOUDY Specific gravity 1.021 1.001 - 1.023    
 pH (UA) 5.0 5.0 - 9.0 Protein TRACE (A) NEG mg/dL Glucose Negative mg/dL Ketone TRACE (A) NEG mg/dL Bilirubin SMALL (A) NEG Blood TRACE (A) NEG Urobilinogen 1.0 0.2 - 1.0 EU/dL Nitrites Negative NEG Leukocyte Esterase Negative NEG    
 WBC 3-5 0 /hpf  
 RBC 0-3 0 /hpf Epithelial cells 0-3 0 /hpf Bacteria 0 0 /hpf Casts 0-3 0 /lpf Crystals, urine OCCASIONAL 0 /LPF Other observations RESULTS VERIFIED MANUALLY METABOLIC PANEL, COMPREHENSIVE Collection Time: 05/22/21  5:50 AM  
Result Value Ref Range Sodium 143 136 - 145 mmol/L Potassium 3.2 (L) 3.5 - 5.1 mmol/L Chloride 106 98 - 107 mmol/L  
 CO2 29 21 - 32 mmol/L Anion gap 8 7 - 16 mmol/L Glucose 103 (H) 65 - 100 mg/dL BUN 24 (H) 8 - 23 MG/DL Creatinine 1.42 (H) 0.6 - 1.0 MG/DL  
 GFR est AA 46 (L) >60 ml/min/1.73m2 GFR est non-AA 38 (L) >60 ml/min/1.73m2 Calcium 13.5 (HH) 8.3 - 10.4 MG/DL  Bilirubin, total 1.2 (H) 0.2 - 1.1 MG/DL  
 ALT (SGPT) 17 12 - 65 U/L  
 AST (SGOT) 44 (H) 15 - 37 U/L Alk. phosphatase 81 50 - 136 U/L Protein, total 5.9 (L) 6.3 - 8.2 g/dL Albumin 3.1 (L) 3.2 - 4.6 g/dL Globulin 2.8 2.3 - 3.5 g/dL A-G Ratio 1.1 (L) 1.2 - 3.5 MAGNESIUM Collection Time: 05/22/21  5:50 AM  
Result Value Ref Range Magnesium 1.5 (L) 1.8 - 2.4 mg/dL CBC WITH AUTOMATED DIFF Collection Time: 05/22/21  5:50 AM  
Result Value Ref Range WBC 5.4 4.3 - 11.1 K/uL  
 RBC 3.11 (L) 4.05 - 5.2 M/uL HGB 9.8 (L) 11.7 - 15.4 g/dL HCT 28.5 (L) 35.8 - 46.3 % MCV 91.6 79.6 - 97.8 FL  
 MCH 31.5 26.1 - 32.9 PG  
 MCHC 34.4 31.4 - 35.0 g/dL  
 RDW 13.3 11.9 - 14.6 % PLATELET 198 (L) 210 - 450 K/uL MPV 10.3 9.4 - 12.3 FL ABSOLUTE NRBC 0.00 0.0 - 0.2 K/uL  
 DF AUTOMATED NEUTROPHILS 65 43 - 78 % LYMPHOCYTES 22 13 - 44 % MONOCYTES 11 4.0 - 12.0 % EOSINOPHILS 0 (L) 0.5 - 7.8 % BASOPHILS 1 0.0 - 2.0 % IMMATURE GRANULOCYTES 1 0.0 - 5.0 %  
 ABS. NEUTROPHILS 3.5 1.7 - 8.2 K/UL  
 ABS. LYMPHOCYTES 1.2 0.5 - 4.6 K/UL  
 ABS. MONOCYTES 0.6 0.1 - 1.3 K/UL  
 ABS. EOSINOPHILS 0.0 0.0 - 0.8 K/UL  
 ABS. BASOPHILS 0.0 0.0 - 0.2 K/UL  
 ABS. IMM. GRANS. 0.1 0.0 - 0.5 K/UL Imaging: No images are attached to the encounter. ASSESSMENT: 
Ms. Rhoda Ortiz clearly has hypercalcemia assocated with a low PTH, suggestive of malignancy. With renal dysfunction, anemia and lytic skull lesions, myeloma has to top the list of potential offenders, but solid tumors and lymphoma cannot be ruled out at this time. PLAN: 
Agree with plans to date. Calcitonin started and Zometa added. Avoid HCTZ. Use only Lasix for diuresis. SPEP pending. I have added serum free light chains. Would proceed with skeletal survey and urine for protein and PEP. She will likely require a bone marrow biopsy. If her workup is not suggestive of myeloma, then I would do CT CAP to look for alternative primary. Monae Parnell MD FACP Oncology and Hematology  [place] [address] [address] 
P [phone] F [phone] 
[email or web address] 
 
 
Elements of this note have been dictated using speech recognition software. As a result, errors of speech recognition may have occurred.

## 2021-05-22 NOTE — H&P
[address] [facility] Service History and Physical 
 
Patient ID: 
Flores De Leon 
female 1940 
[id number] Admission Date: 5/21/2021 Chief Complaint: Diffuse lower back pain and joint pain Reason for Admission: Hypercalcemia and acute kidney injury ASSESSMENT & PLAN: 
 
Skull lesion - possible undiagnosed multiple myeloma vs. Others - f/u skeletal survey, spep, upep Hypercalcemianew onset, etiology unclear, given age, undiagnosed malignancies more likely Follow-up  Above workup Follow-up PTH, TSH, vitamin D level, Continue IV fluid hydration, serial calcium daily 
- consider further imaging if indicated, after renal function improved Confusionssuspect metabolic encephalopathy secondary to above, monitor response Dehydration with acute kidney injury, discontinue IV fluids, consider further renal work-up if serum creatinine fails to normalize overnight Chronic back pain and knee pain - pain control, monitor History of coronary artery disease, hypertension, hyperlipidemia, diet-controlled diabetes 
-Hold home lisinopril and hydrochlorothiazide, resume monitor home medications as indicated,,and monitor clinically while inpatient, currently stable co morbidities add to patient's case complexity Disposition: gen med Diet: cardiac  
VTE ppx: lovenox GI ppx: pecid CODE STATUS: full Surrogate decision-maker: Cyndy Sargent HISTORY OF PRESENT ILLNESS: 
Patient is a [de-identified] y.o. female with medical h/o coronary artery disease, hypertension some blood pressure medications including hydrochlorothiazide, chronic back pain no other significant past medical history is, who presented to [facility] [place] [facility] with generalized weakness called leg gave out\" unable to get up, patient was found to be slightly confused, with back and knee pains bilaterally, 
 
 no evidence of trauma in the emergency room, patient is found to have new onset of hypercalcemia of 13.6.  
 
Patient denies unintentional weight loss, denies night sweats, denies chills Patient denies new supplementation of calcium Allergies Allergen Reactions  Pcn [Penicillins] Rash Prior to Admission Medications Prescriptions Last Dose Informant Patient Reported? Taking? Aspirin, Buffered 81 mg tab   Yes No  
Sig: Take  by mouth. LISINOPRIL/HYDROCHLOROTHIAZIDE (ZESTORETIC PO)   Yes No  
Sig: Take  by mouth. Prasugrel (EFFIENT) 10 mg tablet   No No  
Sig: Take 1 Tab by mouth daily. diltiazem CD (CARDIZEM CD) 240 mg ER capsule   Yes No  
Sig: Take 240 mg by mouth daily. escitalopram oxalate (LEXAPRO) 10 mg tablet   Yes No  
Sig: Take 10 mg by mouth daily. meloxicam (MOBIC) 15 mg tablet   Yes No  
Sig: Take 15 mg by mouth daily. nitroglycerin (NITROSTAT) 0.4 mg SL tablet   No No  
Si Tab by SubLINGual route every five (5) minutes as needed for Chest Pain.  
pravastatin (PRAVACHOL) 40 mg tablet   Yes No  
Sig: Take 40 mg by mouth daily. Facility-Administered Medications: None Past Medical History:  
Diagnosis Date  Arthritis  Coronary atherosclerosis of native coronary artery  Diabetes mellitus type II, controlled ([facility] [address] 2010  Hypertension  Stable angina (HCC) Past Surgical History:  
Procedure Laterality Date  HX APPENDECTOMY  HX CHOLECYSTECTOMY [address] CATHETERIZATION   Pt. had a heart attack during this procedure [facility] Social History Tobacco Use  Smoking status: Never Smoker  Smokeless tobacco: Never Used Substance Use Topics  Alcohol use: No  
 
 
FAMILY HISTORY:    
Family History Problem Relation Age of Onset  Cancer Mother  Cancer Father  Breast Cancer Neg Hx REVIEW OF SYSTEMS: 
A 14 point review of systems was taken and pertinent positive as per HPI. PHYSICAL EXAM: 
 
Visit Vitals BP (!) 175/86 Pulse 79 Temp 98.6 °F (37 °C) Resp 16 Ht 5' 4\" (1.626 m) Wt 81.6 kg (180 lb) SpO2 98% BMI 30.90 kg/m² General: No acute distress, speaking in full sentences, no use of accessory muscles HEENT: Pupils equal and reactive to light and accommodation, oropharynx is clear Neck: Supple, no lymphadenopathy, no JVD Lungs: Clear to auscultation bilaterally Cardiovascular: Regular rate and rhythm with normal S1 and S2 Abdomen: Soft, nontender, nondistended, normoactive bowel sounds Extremities: Joint tenderness without effusions, no cyanosis clubbing or edema Neuro: Nonfocal, A&O x2, mild confusion GCS 14 
Psych: Normal mood and affect Intake/Output last 3 shifts: 
 
 
 
Labs: 
CMP:  
Lab Results Component Value Date/Time  05/21/2021 08:46 PM  
 K 3.4 (L) 05/21/2021 08:46 PM  
  05/21/2021 08:46 PM  
 CO2 30 05/21/2021 08:46 PM  
 AGAP 6 (L) 05/21/2021 08:46 PM  
  (H) 05/21/2021 08:46 PM  
 BUN 26 (H) 05/21/2021 08:46 PM  
 CREA 1.50 (H) 05/21/2021 08:46 PM  
 GFRAA 43 (L) 05/21/2021 08:46 PM  
 GFRNA 36 (L) 05/21/2021 08:46 PM  
 CA 13.7 (HH) 05/21/2021 08:46 PM  
 ALB 3.5 05/21/2021 08:46 PM  
 TBILI 1.2 (H) 05/21/2021 08:46 PM  
 TP 6.5 05/21/2021 08:46 PM  
 GLOB 3.0 05/21/2021 08:46 PM  
 AGRAT 1.2 05/21/2021 08:46 PM  
 ALT 19 05/21/2021 08:46 PM  
 
 
 
CBC:   
Lab Results Component Value Date/Time WBC 5.9 05/21/2021 08:46 PM  
 HGB 10.6 (L) 05/21/2021 08:46 PM  
 HCT 30.6 (L) 05/21/2021 08:46 PM  
  (L) 05/21/2021 08:46 PM  
 
 
Lab Results Component Value Date/Time INR 1.0 02/15/2010 01:00 PM  
 Prothrombin time 10.5 02/15/2010 01:00 PM  
 
 
ABG:  No results found for: PH, PHI, PCO2, PCO2I, PO2, PO2I, HCO3, HCO3I, FIO2, FIO2I Lab Results Component Value Date/Time  (H) 05/21/2021 08:46 PM  
 Troponin-I, Qt. 0.66 (HH) 02/17/2010 04:45 AM  
 
 
 
Imaging & Other Studies: XR Results (maximum last 3): Results from [facility] [place] Encounter encounter on 05/21/21 XR CHEST PA LAT Narrative EXAM: Chest x-ray. INDICATION: Pain after falling. COMPARISON: None. TECHNIQUE: Frontal and lateral view chest x-ray. FINDINGS: The lungs are clear. The cardiac size, mediastinal contour and 
pulmonary vasculature are normal. No pneumothorax or pleural effusion is seen. There are degenerative changes in the spine. No acute displaced fracture is 
seen. Impression No acute process. XR PELV AP ONLY Narrative EXAM: Pelvis x-ray. INDICATION: Pain after falling. COMPARISON: None. TECHNIQUE: Single frontal view. FINDINGS: No acute fracture or dislocation is seen. The pelvic ring is intact. Minimal arthritic changes are seen in both hip and sacroiliac joints. Impression No acute process. XR SPINE LUMB 2 OR 3 V Narrative EXAM: Lumbar spine x-rays. INDICATION: Pain after falling. COMPARISON: None. TECHNIQUE: 3 views. FINDINGS: No acute fracture or malalignment is identified. The disc spaces are 
preserved. There is minor multilevel degenerative spondylosis and facet 
arthritis. Impression No acute process. CT Results (maximum last 3): [place] [facility]21 CT HEAD WO CONT Narrative EXAM: Noncontrast CT head. INDICATION: Pain, fall injury. COMPARISON: None. TECHNIQUE: Noncontrast CT images of the head were obtained. Radiation dose 
reduction techniques were used for this study. Our CT scanners use one or all 
of the following:  Automated exposure control, adjustment of the mA or kV 
according to patient size, iterative reconstruction. FINDINGS: There is mild volume loss and chronic small vessel ischemic changes in 
the white matter. No acute infarct, hemorrhage or mass is identified. There is 
no mass effect, midline shift or depressed fracture. The visualized paranasal 
sinuses and mastoid air cells are clear. There are multiple subcentimeter lucent 
lesions in both sides of the skull. Impression 1. No acute intracranial process. 2. Multiple small lucent lesions in the skull, without evidence of extraosseous 
extension. Differential considerations would include metastatic disease and 
multiple myeloma. MRI Results (maximum last 3): No results found for this or any previous visit. Nuclear Medicine Results (maximum last 3): No results found for this or any previous visit. US Results (maximum last 3): Results from Office Visit encounter on 01/15/16 Kendal Oleary 1735 Narrative Final Vascular Lab ultrasound report scanned under the imaging 
tab. Click RESULTS link to view. DEXA Results (maximum last 3): No results found for this or any previous visit. WILLI Results (maximum last 3): Results from Hospital Encounter encounter on 12/01/16 WILLI MAMMO BI SCREENING DIGTL Narrative STUDY:  Bilateral digital screening mammogram 
 
INDICATION:  Screening; 
 
COMPARISON:  5/22/2014, others back to 12/14/2009 FINDINGS: Bilateral digital screening mammography was performed, and is 
interpreted in conjunction with a computer assisted detection (CAD) system. The breast parenchyma is heterogeneously dense, which may limit detection of 
small masses. No suspicious masses, calcifications, or architectural distortion are 
identified. There are scattered typically benign calcifications bilaterally, and 
stable asymmetries in each posterior breast.  There is no skin thickening or 
nipple retraction. Impression IMPRESSION:  No mammographic evidence of malignancy. Recommend annual mammogram in one year. A reminder letter will be scheduled. BI-RADS Assessment Category 2: Benign finding. BD3 [place] [facility] [place] on 05/22/14 WILLI MAMMO BI SCREENING DIGTL Narrative STUDY:  Bilateral digital screening mammogram 
 
INDICATION:  Screening COMPARISON:  11/08/2012 FINDINGS: Bilateral digital mammography was performed, and is interpreted in 
conjunction with a computer assisted detection (CAD) system. The breast parenchyma is heterogeneously dense. There is stable secretory 
calcification in the inferior left breast.  No significant masses or suspicious 
calcifications are identified. There is no skin thickening or nipple 
retraction. There is no architectural distortion. Impression 
:  No mammographic evidence of malignancy. BI-RADS Assessment Category 2: Benign finding. Annual mammograms are recommended for all women over the age of 36. A 
reminder letter will be sent to the patient. IR Results (maximum last 3): No results found for this or any previous visit. VAS/US Results (maximum last 3): No results found for this or any previous visit. PET Results (maximum last 3): No results found for this or any previous visit. EKG Results Procedure 720 Value Units Date/Time EKG 12 LEAD INITIAL [[id number]] Collected: 05/21/21 2035 Order Status: Completed Updated: 05/21/21 2056 Ventricular Rate 73 BPM   
  Atrial Rate 202 BPM   
  QRS Duration 92 ms Q-T Interval 366 ms   
  QTC Calculation (Bezet) 403 ms Calculated R Axis 75 degrees Calculated T Axis 38 degrees Diagnosis --  
  !! AGE AND GENDER SPECIFIC ECG ANALYSIS !! Normal sinus rhythm Nonspecific ST and T wave abnormality Abnormal ECG When compared with ECG of 17-FEB-2010 06:42, 
Junctional rhythm has replaced Sinus rhythm Confirmed by Maryam Sanchez MD (), Nathan Rasmussen ([id number]) on 5/21/2021 8:56:19 PM 
  
  
 
 
Active Problems: 
Patient Active Problem List  
 Diagnosis Date Noted  Hypercalcemia 05/21/2021  Female stress incontinence 06/23/2015  Cystocele, midline 06/23/2015  Urge incontinence 05/28/2014  Coronary atherosclerosis of native coronary artery 02/16/2010  Stable angina ([facility] [address] 02/16/2010  Diabetes mellitus type II, controlled ([address] 02/16/2010  Dyslipidemia 02/16/2010  Essential hypertension, benign 02/16/2010 Carlos Perkins  [place] [facility] 5/21/2021 11:43 PM

## 2021-05-22 NOTE — PROGRESS NOTES
TRANSFER - IN REPORT: 
 
Verbal report received from Sal Jerry RN on Citlaly Menon  being received from ED for routine progression of care Report consisted of patients Situation, Background, Assessment and  
Recommendations(SBAR). Information from the following report(s) SBAR was reviewed with the receiving nurse. Opportunity for questions and clarification was provided. Assessment completed upon patients arrival to unit and care assumed.

## 2021-05-22 NOTE — ACP (ADVANCE CARE PLANNING)
Advance Care Planning Advance Care Planning Inpatient Note [facility] Today's Date: 5/22/2021 Unit: [facility] 6 MED SURG Received request from patient. Upon review of chart and communication with care team, request Health Care Provider's clarification of patient's decision making capacity. Patient and Child/children was/were present in the room during visit. Goals of ACP Conversation: 
patient sleeping Health Care Decision Makers:   
 
Click here to complete [address] including selection of the Healthcare Decision Maker Relationship (ie \"Primary\") Today we: 
Verified Documents Advance Care Planning Documents (Patient Wishes) on file: 
None Assessment:   
*deferred conversation** Interventions: 
Reviewed but did not complete ACP document Outcomes/Plan: 
follow up later when patient is alert Electronically signed by Chaplain Mariluz on 5/22/2021 at 9:34 AM

## 2021-05-22 NOTE — ED NOTES
TRANSFER - OUT REPORT: 
 
Verbal report given to Jun Boyce (name) on Janiya Kimble  being transferred to [facility](unit) for routine progression of care Report consisted of patients Situation, Background, Assessment and  
Recommendations(SBAR). Information from the following report(s) SBAR, ED Summary, [facility] and Recent Results was reviewed with the receiving nurse. Lines:  
Peripheral IV 05/21/21 Left;Posterior Wrist (Active) Site Assessment Clean, dry, & intact 05/21/21 2302 Phlebitis Assessment 0 05/21/21 2302 Infiltration Assessment 0 05/21/21 2302 Dressing Status Clean, dry, & intact 05/21/21 2302 Dressing Type Transparent 05/21/21 2606 Hub Color/Line Status Blue;Flushed 05/21/21 2302 Opportunity for questions and clarification was provided. Patient transported with: 
 [employer] [facility]

## 2021-05-22 NOTE — PROGRESS NOTES
Advance directive consult: 
 
Patient was resting Spoke with family Will defer conversation till later

## 2021-05-22 NOTE — ED TRIAGE NOTES
Pt arrives via EMS from home with CO fall last night. Pt was found on the floor at 1100 this morning. Son put pt back in bed. Reports lower back and bilateral leg pain. Pt unsure if she hit her head Unknown LOC. 550 NSS given en route. per son pt is more confused than baseline. Pt reports mechanical fall with walker yesterday evening \"my knee gave out on me\"

## 2021-05-23 NOTE — PROGRESS NOTES
Daily Progress Note -- Hematology/ Medical Oncology Patient Name: Zeynep Shukla    Date of Visit: 2021 : 1940  Age:80 y.o. We are asked to see Ramy Stewart for evaluation of hypercalcemia. She has a history of hypertensions with limited data in the BSSF system. A CMP from [facility] one year ago showed no hypercalcemia. She was seen in our ED after a fall in April and had a normal calcium at that time with a creatinine of 1.02. Subsequent to this fall, she has had some back pain, but also become progressively more listless and anorexic especially over the past four days. This became associated with some mild confusions prompting medical evaluation. She was found to be hypercalcemic and with worsening renal function and was admitted. She was taking HCTZ as an outpatient. CT scanning of her head to evaluate her encephalopathy was notable for normal brain but multiple lytic lesions in the calvarium. Interval history: 
Sleepy during my visit today. Calcium dropping-now 11.8 with albumin of 3.3 Most diagnostic labs still pending Skeletal survey reviewed. Skull films very suggestive of myeloma. Medications:  
Current Facility-Administered Medications Medication Dose Route Frequency Provider Last Rate Last Admin  aspirin chewable tablet 81 mg  81 mg Oral DAILY Drea Smart, DO   81 mg at 21 0809  
 dilTIAZem ER (CARDIZEM CD) capsule 240 mg  240 mg Oral DAILY Drea Smart, DO   240 mg at 21 5639  escitalopram oxalate (LEXAPRO) tablet 10 mg  10 mg Oral DAILY Drea Smart, DO   10 mg at 21 3378  prasugreL (EFFIENT) tablet 10 mg  10 mg Oral DAILY Drea Smart, DO   10 mg at 21 0900  pravastatin (PRAVACHOL) tablet 40 mg  40 mg Oral Q24H Drea Smart, DO   40 mg at 21 2047  sodium chloride (NS) flush 5-40 mL  5-40 mL IntraVENous Q8H Drea Smart, DO   10 mL at 21 6303  sodium chloride (NS) flush 5-40 mL  5-40 mL IntraVENous PRN Barragan Sos, Ramón Byrneo,       
 acetaminophen (TYLENOL) tablet 650 mg  650 mg Oral Q6H PRN Chalo Cera, DO Or  
 acetaminophen (TYLENOL) suppository 650 mg  650 mg Rectal Q6H PRN Chalo Cera, DO      
 polyethylene glycol (MIRALAX) packet 17 g  17 g Oral DAILY Chalo Cera, DO   17 g at 05/22/21 0809  
 senna-docusate (PERICOLACE) 8.6-50 mg per tablet 1 Tablet  1 Tablet Oral BID Chalo Cera, DO   1 Tablet at 05/22/21 2047  
 magnesium hydroxide (MILK OF MAGNESIA) 400 mg/5 mL oral suspension 30 mL  30 mL Oral DAILY PRN Chalo Cera, DO      
 bisacodyL (DULCOLAX) suppository 10 mg  10 mg Rectal DAILY PRN Chalo Cera, DO      
 ondansetron (ZOFRAN ODT) tablet 4 mg  4 mg Oral Q8H PRN Chalo Cera, DO Or  
 ondansetron (ZOFRAN) injection 4 mg  4 mg IntraVENous Q6H PRN Chalo Cera, DO   4 mg at 05/23/21 1862  famotidine (PEPCID) tablet 20 mg  20 mg Oral DAILY Chalo Cera, DO   20 mg at 05/22/21 0407  enoxaparin (LOVENOX) injection 40 mg  40 mg SubCUTAneous DAILY Chalo Cera, DO   40 mg at 05/23/21 2113  potassium chloride (K-DUR, KLOR-CON) SR tablet 40 mEq  40 mEq Oral BID Anjelica Oates MD   40 mEq at 05/22/21 1559  melatonin tablet 5 mg  5 mg Oral QHS Rojas, Thu, DO   5 mg at 05/22/21 2259 Allergies: Allergies Allergen Reactions  Pcn [Penicillins] Rash Review of Systems: The Review of Systems is documented in full in the internal medical record. All systems are negative other than for those noted above. Physical Examination: 
General Appearance: Healthy appearing patient in no acute distress. Vital signs:  
Visit Vitals BP (!) 156/78 (BP Patient Position: Lying) Pulse 74 Temp 97.2 °F (36.2 °C) Resp 18 Ht 5' 4\" (1.626 m) Wt 182 lb 3.2 oz (82.6 kg) SpO2 96% BMI 31.27 kg/m² HEENT: No oral exam performed Lymph nodes: There is no cervical, supraclavicular, axillary or inguinal adenopathy. Lungs: The lungs are clear to auscultation and percussion.  There is no egophony. Heart: There is no jugular venous distention. The rate is normal and rhythm regular. The S1 and S2 are normal and there are no murmurs or rubs. Abdomen: Soft, non-tender, bowel sounds present and normal, no appreciated hepatosplenomegaly. No palpable masses. Skin: No rash, petechiae or ecchymoses. No evidence of malignancy. Extremities: No cyanosis, clubbing or edema. Neurologic: Sleepy, not examined Labs/Imaging: 
Lab Results Component Value Date/Time WBC 6.0 05/23/2021 06:00 AM  
 HGB 10.2 (L) 05/23/2021 06:00 AM  
 HCT 28.1 (L) 05/23/2021 06:00 AM  
 PLATELET 717 (L) 46/42/7129 06:00 AM  
 MCV 89.8 05/23/2021 06:00 AM  
 
 
Lab Results Component Value Date/Time Sodium 142 05/23/2021 06:00 AM  
 Potassium 3.7 05/23/2021 06:00 AM  
 Chloride 107 05/23/2021 06:00 AM  
 CO2 29 05/23/2021 06:00 AM  
 Anion gap 6 (L) 05/23/2021 06:00 AM  
 Glucose 167 (H) 05/23/2021 06:00 AM  
 BUN 24 (H) 05/23/2021 06:00 AM  
 Creatinine 1.56 (H) 05/23/2021 06:00 AM  
 BUN/Creatinine ratio 22 10/29/2015 11:16 AM  
 GFR est AA 41 (L) 05/23/2021 06:00 AM  
 GFR est non-AA 34 (L) 05/23/2021 06:00 AM  
 Calcium 11.8 (H) 05/23/2021 06:00 AM  
 Alk. phosphatase 84 05/23/2021 06:00 AM  
 Protein, total 6.2 (L) 05/23/2021 06:00 AM  
 Albumin 3.3 05/23/2021 06:00 AM  
 Globulin 2.9 05/23/2021 06:00 AM  
 A-G Ratio 1.1 (L) 05/23/2021 06:00 AM  
 ALT (SGPT) 21 05/23/2021 06:00 AM  
 
 
 
 
ASSESSMENT: 
As noted previously, she has lytic bone lesions, anemia, hypercalcemia and renal insufficiency. There is a high suspicion for myeloma versus other metastatic malignancy with lab data pending. PLAN: 
Dr. Rachel Dasilva takes over service tomorrow. Await lab data, and she will likely need a marrow. Ernesto Orta MD FACP Oncology and Hematology  [address] 
P [phone] F [phone] 
[email or web address] 
 Elements of this note have been dictated using speech recognition software. As a result, errors of speech recognition may have occurred.

## 2021-05-23 NOTE — PROGRESS NOTES
SPEECH PATHOLOGY NOTE: 
Consult received and chart reviewed and discussed with RN. Attempted to see patient for initial assessment, but she is currently working with PT. Will try again later as schedule permits and patient available.  
Vianca Rushing MA, CCC-SLP

## 2021-05-23 NOTE — PROGRESS NOTES
CM contacted the patient's son Peter Lincoln [phone]- listed as emergency contact,  to complete assessment. Demographic information verified by son. The patient lives alone in a one story home with a ramp at the entrance. Son confirmed prior to this visit, the patient is independent with completing ADL's and drives. DME: Renetta June Diabetes: YES Patient obtains her prescription medications from [facility] in [place]. Son voiced no difficulty with obtaining medications in the community. Discharge planning: PT has been consulted. Per therapy evaluation this day, the patient has been recommended for STR. Per son, patient and family are agreeable to STR. CM will place SNF list at bedside. Son will provide SNF choices, after reviewing list. PPD has not been ordered. Please consult or notify CM if any needs shall arise. CM continues to monitor the patient during this hospitalization. Care Management Interventions PCP Verified by CM: Yes Mode of Transport at Discharge: Other (see comment) (TBD: Based upon need. ) Transition of Care Consult (CM Consult): Discharge Planning Discharge Durable Medical Equipment: No 
Physical Therapy Consult: Yes Occupational Therapy Consult: No 
Speech Therapy Consult: Yes Current Support Network: Lives Alone, Own Home Confirm Follow Up Transport: Family (Patient is able to drive. ) Name of the Patient Representative Who was Provided with a Choice of Provider and Agrees with the Discharge Plan: Patient's Son Peter Lincoln [employer] Information Provided?: No 
Discharge Location Discharge Placement: Skilled nursing facility

## 2021-05-23 NOTE — PROGRESS NOTES
Pt appears to have trouble swallowing, and coughing slightly with swallowing. Will hold PO meds. MD made aware.

## 2021-05-23 NOTE — PROGRESS NOTES
Shift Summary 
  
Patient admitted 2 days ago with hypercalcemia, dehydration and weakness . Patient received IM calcitonin this shift. Iv fluid discontinued this shift. Hourly rounds performed on pt. Left wrist IV site remained patent. Patient has been afebrile, 2 episode of nausea noted and Zofran IV was given, MD made aware and new order received. Restlessness noted and MD made aware an order for melatonin given and completed with little relief noted. 24 Hr urine completed and sent to lab this shift. No other concern voiced or noted this shift. Patient remained alert and oriented x 2 VSS, see chart, adequate output and 0 BM noted. Son remained at bedside. No acute distress noted

## 2021-05-23 NOTE — PROGRESS NOTES
ACUTE PHYSICAL THERAPY GOALS: 
(Developed with and agreed upon by patient and/or caregiver. ) LTG: 
(1.)Ms. Etta Mercado will move from supine to sit and sit to supine , scoot up and down and roll side to side in bed with MODERATE ASSIST within 7 treatment day(s). (2.)Ms. Etta Mercado will transfer from bed to chair and chair to bed with MODERATE ASSIST using the least restrictive device within 7 treatment day(s). (3.)Ms. Etta Mercado will ambulate with MODERATE ASSIST for 20 feet with the least restrictive device within 7 treatment day(s). (4.)Ms. Etta Mercado will tolerate at least 23 min of dynamic standing activity to assist standing ADLs with the least restrictive device within 7 treatment days. ________________________________________________________________________________________________ PHYSICAL THERAPY ASSESSMENT: Initial Assessment, Daily Note and AM PT Treatment Day # 1 Nic Walls is a [de-identified] y.o. female PRIMARY DIAGNOSIS: Hypercalcemia Hypercalcemia [E83.52] Reason for Referral: ICD-10: Treatment Diagnosis: Generalized Muscle Weakness (M62.81) Other lack of cordination (R27.8) Difficulty in walking, Not elsewhere classified (R26.2) Other abnormalities of gait and mobility (R26.89) Unspecified Lack of Coordination (R27.9) [place]PAT[place]: [place] / [place] / Product Type: Medicare /  
 
ASSESSMENT:  
 
REHAB RECOMMENDATIONS:  
Recommendation to date pending progress: 
Setting:  Short-term Rehab Equipment:  
 already has RW PRIOR LEVEL OF FUNCTION: 
(Prior to Hospitalization) INITIAL/CURRENT LEVEL OF FUNCTION: 
(Most Recently Demonstrated) Bed Mobility: 
 Independent Sit to Stand:  Modified Independent Transfers:  Modified Independent Gait/Mobility:  Modified Independent Bed Mobility:  Maximal Assistance Sit to Stand: 
 Unable to perform Transfers: 
 Unable to perform Gait/Mobility: 
 Unable to perform ASSESSMENT: Ms. Arelis Cruz presents in supine, extremely lethargic, A&Ox1, adult son present. She moves extremely slowly w/ max A across the board today. B LE are noted to have little tone/ active movement. Once sitting EOB, she displays good static sitting balance. She is unable to initiate standing, but lateral scooting alongside EOB is performed w/ significant extra time, heavy/constant multi-modal cues, and max A. Pnt then performs sit to supine and bilateral rolling w/ max A. Overall, she presents as functioning severely lower than her baseline in both cognition and mobility. Will continue to follow. SUBJECTIVE:  
Ms. Arelis Cruz states, \"Son? .\" 
 
SOCIAL HISTORY/LIVING ENVIRONMENT: lives alone, 2 recent falls, mod I w/ RW, adult son lives in town Home Environment: Private residence # Steps to Enter: 0 (ramp) One/Two Story Residence: One story Living Alone: Yes Support Systems: Child(ericka) OBJECTIVE:  
 
PAIN: VITAL SIGNS: LINES/DRAINS:  
Pre Treatment: Pain Screen Pain Scale 1: Numeric (0 - 10) Pain Intensity 1: 0 Post Treatment: 0   Schneider Catheter O2 Device: None (Room air) GROSS EVALUATION: 
 Within Functional Limits Abnormal/ Functional Abnormal/ Non-Functional (see comments) Not Tested Comments: AROM [] [] [x] [] Little active movement of bilateral LE  
PROM [] [] [x] [] Strength [] [x] [] [] Decreased bilateral hip and knee extension Balance [] [x] [] [] Posture [] [x] [] [] flexed Sensation [x] [] [] [] Coordination [] [x] [] [] Tone [] [x] [] [] Low LE tone Edema [] [x] [] [] Activity Tolerance [] [x] [] [] Deconditioned   
 [] [] [] [] COGNITION/ 
PERCEPTION: Intact Impaired  
(see comments) Comments:  
Orientation [] [x] x1 Vision [x] [] Hearing [x] [] Command Following [] [x] Requires constant, heavy multi-modal cues Safety Awareness [] [x]   
 [] [] MOBILITY: I Mod I S SBA CGA Min Mod Max Total  NT x2 Comments:  
Bed Mobility Rolling [] [] [] [] [] [] [] [x] [] [] [] Supine to Sit [] [] [] [] [] [] [] [x] [] [] [] Scooting [] [] [] [] [] [] [] [x] [] [] [] Sit to Supine [] [] [] [] [] [] [] [x] [] [] []   
Transfers Sit to Stand [] [] [] [] [] [] [] [] [] [x] [] Bed to Chair [] [] [] [] [] [] [] [] [] [x] [] Stand to Sit [] [] [] [] [] [] [] [] [] [x] [] I=Independent, Mod I=Modified Independent, S=Supervision, SBA=Standby Assistance, CGA=Contact Guard Assistance,  
Min=Minimal Assistance, Mod=Moderate Assistance, Max=Maximal Assistance, Total=Total Assistance, NT=Not Tested GAIT: I Mod I S SBA CGA Min Mod Max Total  NT x2 Comments:  
Level of Assistance [] [] [] [] [] [] [] [] [] [x] [] Distance n/a   
DME N/A Gait Quality n/a Weightbearing Status N/A I=Independent, Mod I=Modified Independent, S=Supervision, SBA=Standby Assistance, CGA=Contact Guard Assistance,  
Min=Minimal Assistance, Mod=Moderate Assistance, Max=Maximal Assistance, Total=Total Assistance, NT=Not Tested [facility] Mobility Inpatient Short Form How much difficulty does the patient currently have. .. Unable A Lot A Little None 1. Turning over in bed (including adjusting bedclothes, sheets and blankets)? [] 1   [x] 2   [] 3   [] 4  
2. Sitting down on and standing up from a chair with arms ( e.g., wheelchair, bedside commode, etc.)   [] 1   [x] 2   [] 3   [] 4  
3. Moving from lying on back to sitting on the side of the bed? [] 1   [x] 2   [] 3   [] 4 How much help from another person does the patient currently need. .. Total A Lot A Little None 4. Moving to and from a bed to a chair (including a wheelchair)? [] 1   [x] 2   [] 3   [] 4  
5. Need to walk in hospital room? [] 1   [x] 2   [] 3   [] 4  
6. Climbing 3-5 steps with a railing? [] 1   [x] 2   [] 3   [] 4  
© 2007, Trustees of [address] [place] [address] [address] [id number], under license to [employer]. All rights reserved Score:  Initial: 12 Most Recent: X (Date: -- )   
Interpretation of Tool:  Represents activities that are increasingly more difficult (i.e. Bed mobility, Transfers, Gait). PLAN:  
FREQUENCY/DURATION: PT Plan of Care: 3 times/week for duration of hospital stay or until stated goals are met, whichever comes first. 
 
PROBLEM LIST:  
(Skilled intervention is medically necessary to address:) 1. Decreased ADL/Functional Activities 2. Decreased Activity Tolerance 3. Decreased AROM/PROM 4. Decreased Balance 5. Decreased Cognition 6. Decreased Coordination 7. Decreased Gait Ability 8. Decreased Strength 9. Decreased Transfer Abilities INTERVENTIONS PLANNED:  
(Benefits and precautions of physical therapy have been discussed with the patient.) 1. Therapeutic Activity 2. Therapeutic Exercise/HEP 3. Neuromuscular Re-education 4. Gait Training 5. Manual Therapy 6. Education TREATMENT:  
 
EVALUATION: Moderate Complexity : (Untimed Charge) TREATMENT:  
($$ Therapeutic Activity: 23-37 mins    ) Therapeutic Activity (30 Minutes): Therapeutic activity included Rolling, Supine to Sit, Sit to Supine, Scooting, Lateral Scooting and Sitting balance  to improve functional Mobility, Strength, ROM and Activity tolerance. TREATMENT GRID: 
N/A 
 
AFTER TREATMENT POSITION/PRECAUTIONS: 
Alarm Activated, Bed, Needs within reach, RN notified and Visitors at bedside INTERDISCIPLINARY COLLABORATION: 
RN/PCT and PT/PTA TOTAL TREATMENT DURATION: 
PT Patient Time In/Time Out Time In: 1034 Time Out: 1114 Patti Toscano

## 2021-05-23 NOTE — PROGRESS NOTES
Pt is more alert and awake at this hour. She does not know her name,  [employer], or situation. She did state she was at [address]. She attempted to eat dinner tray, but only was able to take in maybe three bites of spaghetti. She drank a few sips of tea, all without any difficulty/coughing. Family present.

## 2021-05-23 NOTE — PROGRESS NOTES
Hospitalist Progress Note 2021 Admit Date: 2021  8:22 PM  
NAME: Kaiden Moralez :  1940 MRN:  [id number] Attending: Barb Henriquez MD 
PCP:  Shady Woods MD 
 
SUBJECTIVE:  
 
Kaiden Moralez is a [de-identified]years old female with h/o CAD, HTN, HLD, admitted on  for acute encephalopathy secondary to hypercalcemia which is strongly suspected to be from underlying multiple myeloma based on radiographic evidence of translucent skull lesions on CT head. Pt also had acute renal failure with creatinine of 1.5. pt has suppressed PTH intact and vitamin D levels. : 
Patient is currently somnolent and difficult to arouse. Patient's son is present at bedside who reports patient was noted to be agitated and restless last night. Patient was given melatonin that helped her to sleep. No nausea, vomiting, diarrhea. No fever. ROS: 
10 point review system was limited as patient is currently not able to answer any questions, information obtained from patient's son. PHYSICAL EXAM  
 
 
Visit Vitals BP (!) 164/86 Pulse 74 Temp 97.8 °F (36.6 °C) Resp 18 Ht 5' 4\" (1.626 m) Wt 82.6 kg (182 lb 3.2 oz) SpO2 94% BMI 31.27 kg/m² Temp (24hrs), Av.3 °F (36.8 °C), Min:97.8 °F (36.6 °C), Max:98.9 °F (37.2 °C) Oxygen Therapy O2 Sat (%): 94 % (21 0309) Pulse via Oximetry: 70 beats per minute (21 2329) O2 Device: None (Room air) (21 1930) Intake/Output Summary (Last 24 hours) at 2021 0495 Last data filed at 2021 Gross per 24 hour Intake 3192.08 ml Output 1300 ml Net 1892.08 ml General: Elderly, NAD, sleeping, on room air Head:  Atraumatic Normocephalic. Eyes:  PERRLA, EOMI, Anicteric. ENT:  No discharges/lesions. Lungs:  CTA Bilaterally. CVS:  Regular rate and rhythm,  No murmur, rub, or gallop, No JVD, No lower   extremity edema. Abdomen: Soft, Non distended, Non tender, Positive bowel sounds.  
MSK:  No deformities, lesions, Spontaneously moves extremities. Neurologic:  Moving all 4 extremities spontaneously and to painful stimuli. Psychiatry:      Unable to assess. Skin:   No rash/lesions. Good skin turgor Heme/Lymph/Immune:  No petechiae, ecchymoses, overt signs of bleeding or    lymphadenopathy noted. Recent Results (from the past 24 hour(s)) CBC WITH AUTOMATED DIFF Collection Time: 05/23/21  6:00 AM  
Result Value Ref Range WBC 6.0 4.3 - 11.1 K/uL  
 RBC 3.13 (L) 4.05 - 5.2 M/uL  
 HGB 10.2 (L) 11.7 - 15.4 g/dL HCT 28.1 (L) 35.8 - 46.3 % MCV 89.8 79.6 - 97.8 FL  
 MCH 32.6 26.1 - 32.9 PG  
 MCHC 36.3 (H) 31.4 - 35.0 g/dL  
 RDW 13.2 11.9 - 14.6 % PLATELET 427 (L) 068 - 450 K/uL MPV 10.5 9.4 - 12.3 FL ABSOLUTE NRBC 0.00 0.0 - 0.2 K/uL  
 DF AUTOMATED NEUTROPHILS 82 (H) 43 - 78 % LYMPHOCYTES 10 (L) 13 - 44 % MONOCYTES 7 4.0 - 12.0 % EOSINOPHILS 0 (L) 0.5 - 7.8 % BASOPHILS 0 0.0 - 2.0 % IMMATURE GRANULOCYTES 1 0.0 - 5.0 %  
 ABS. NEUTROPHILS 4.9 1.7 - 8.2 K/UL  
 ABS. LYMPHOCYTES 0.6 0.5 - 4.6 K/UL  
 ABS. MONOCYTES 0.4 0.1 - 1.3 K/UL  
 ABS. EOSINOPHILS 0.0 0.0 - 0.8 K/UL  
 ABS. BASOPHILS 0.0 0.0 - 0.2 K/UL  
 ABS. IMM. GRANS. 0.1 0.0 - 0.5 K/UL Imaging Betsy Miller All diagnostic imaging personally reviewed by me. EXAM: Noncontrast CT head. 
  
INDICATION: Pain, fall injury. 
  
COMPARISON: None. 
  
TECHNIQUE: Noncontrast CT images of the head were obtained. Radiation dose 
reduction techniques were used for this study. Our CT scanners use one or all 
of the following:  Automated exposure control, adjustment of the mA or kV 
according to patient size, iterative reconstruction. 
  
FINDINGS: There is mild volume loss and chronic small vessel ischemic changes in 
the white matter. No acute infarct, hemorrhage or mass is identified. There is 
no mass effect, midline shift or depressed fracture.  The visualized paranasal 
sinuses and mastoid air cells are clear. There are multiple subcentimeter lucent 
lesions in both sides of the skull. 
  
IMPRESSION 1. No acute intracranial process. 2. Multiple small lucent lesions in the skull, without evidence of extraosseous 
extension. Differential considerations would include metastatic disease and 
multiple myeloma. ASSESSMENT Hospital Problems as of 5/23/2021 Date Reviewed: 6/15/2020 Codes Class Noted - Resolved POA Acute encephalopathy ICD-10-CM: G93.40 ICD-9-CM: 348.30  5/22/2021 - Present Unknown Hypokalemia ICD-10-CM: E87.6 ICD-9-CM: 276.8  5/22/2021 - Present Unknown Acute renal failure with tubular necrosis (HCC) ICD-10-CM: N17.0 ICD-9-CM: 584.5  5/22/2021 - Present Unknown * (Principal) Hypercalcemia ICD-10-CM: O71.41 
ICD-9-CM: 275.42  5/21/2021 - Present Unknown [facility], [facility] ICD-10-CM: E11.9 ICD-9-CM: 250.00  2/16/2010 - Present Yes Dyslipidemia ICD-10-CM: E78.5 ICD-9-CM: 272.4  2/16/2010 - Present Yes Essential hypertension, benign ICD-10-CM: I10 
ICD-9-CM: 401.1  2/16/2010 - Present Yes Plan: #Acute encephalopathy: 
Secondary to dehydration and hypercalcemia 5/23: 
Patient continues to be confused, somnolent and encephalopathic Calcium down from 13.5-11.8, albumin 3.3. Continue correcting the underlying etiology of which is hypercalcemia and dehydration. No underlying infectious etiology suspected currently. #Hypercalcemia: 
5/23: 
Calcium down from 13.5-11.8, albumin 3.3 Currently on IV fluids. Status post 1 dose of Zometa Status post 2 doses of calcitonin Patient was on HCTZ as outpatient that could have also contributed to hypercalcemia however given the radiographic evidence of multiple lytic lesion in her skull multiple myeloma is suspected. Consulted IR for bone marrow biopsy for tomorrow Follow-up with SPEP and serum free light chains.  
Ordered for skeletal survey and urine protein and PEP #Acute renal failure: 
5/23: Baseline is normal 
Creatinine is stable, 1.5 (1.4 yesterday). Plan to continue IV fluids. #Hypokalemia: 3.7 
5/23: 
Patient is currently on oral KCl 40 mEq twice daily for 4 doses. #Hypomagnesemia: 1.5 
5/23: 
Status post replacement, follow with magnesium level tomorrow 5/24. #HTN: 
Continue home medication diltiazem CD. BP is optimally controlled, continue to monitor. #Anxiety depression: 
Continue Lexapro 10 mg p.o. daily #GI prophylaxis with Pepcid #CAD: 
Continue aspirin and Effient with Pravachol. #DVT prophylaxis with Lovenox PT and OT eval 
 
Ordered for speech eval, patient is currently n.p.o. Disposition: Pending until medically stable FULL CODE Monica Garza MD

## 2021-05-24 NOTE — CONSULTS
Renal Consult Subjective:  
 
Sherry Mckeon is a [de-identified] y.o.  female who is being seen for VIET. She has a history of hypertension on an ace and HCTZ , with normal baseline creatinine at 1.0. She was seen in our ED after a fall in April and had a normal calcium at that time with a creatinine of 1.02. She was started on meloxicam after. She has some chronic  back pain, fell again prior to admission and family noted more confusion. Typically  Is up and about, driving and doing well. . With above symptoms she came to the ED and was found to be hypercalcemic, with VIET and was admitted. . CT scanning of her head to evaluate her encephalopathy was notable for normal brain but multiple lytic lesions in the calvarium suggestive of myleoma. Her SPEP is pending and may be getting bone marrow biopsy. 24 hour urine was done which showed 200mg of urine protein. She has been on IVF and creatinine has  Risen to 1.89. Calcium was treated with calcitonin and bisphosphonate and has normalized. She is more alert today but still confused per family. No fever, chills, sweats, epistaxis, vision changes, headache, shortness of breath, wheezing,  chest pain, palpitations. No nausea/vomitting, diarrhea or constipation. No dysuria, hematuria. No paresthesia, seizure history, or skin rashes. Past Medical History:  
Diagnosis Date  Arthritis  Coronary atherosclerosis of native coronary artery  Diabetes mellitus type II, controlled ([facility] 2/16/2010  Hypertension  Stable angina (HCC)  Stable angina (HCC) Past Surgical History:  
Procedure Laterality Date  HX APPENDECTOMY  HX [address]LECYSTECTOMY [address] CATHETERIZATION  2010 Pt. had a heart attack during this procedure [address] [facility] Family History Problem Relation Age of Onset  Cancer Mother  Cancer Father  Breast Cancer Neg Hx Social History Tobacco Use  Smoking status: Never Smoker  Smokeless tobacco: Never Used Substance Use Topics  Alcohol use: No  
   
Current Facility-Administered Medications Medication Dose Route Frequency Provider Last Rate Last Admin  lactated Ringers infusion  75 mL/hr IntraVENous CONTINUOUS Angy Pop MD 75 mL/hr at 05/24/21 0917 75 mL/hr at 05/24/21 9206  tuberculin injection 5 Units  5 Units IntraDERMal Toma Castro MD   5 Units at 05/23/21 1728  aspirin chewable tablet 81 mg  81 mg Oral DAILY Oleta Branch, DO   81 mg at 05/24/21 1268  dilTIAZem ER (CARDIZEM CD) capsule 240 mg  240 mg Oral DAILY Oleta Branch, DO   240 mg at 05/24/21 0462  escitalopram oxalate (LEXAPRO) tablet 10 mg  10 mg Oral DAILY Oleta Branch, DO   10 mg at 05/24/21 0568  prasugreL (EFFIENT) tablet 10 mg  10 mg Oral DAILY Oleta Branch, DO   10 mg at 05/24/21 0900  pravastatin (PRAVACHOL) tablet 40 mg  40 mg Oral Q24H Oleta Branch, DO   40 mg at 05/23/21 2024  sodium chloride (NS) flush 5-40 mL  5-40 mL IntraVENous Q8H Oleta Branch, DO   10 mL at 05/24/21 2498  sodium chloride (NS) flush 5-40 mL  5-40 mL IntraVENous PRN Oleta Branch, DO      
 acetaminophen (TYLENOL) tablet 650 mg  650 mg Oral Q6H PRN Oleta Branch, DO Or  
 acetaminophen (TYLENOL) suppository 650 mg  650 mg Rectal Q6H PRN Oleta Branch, DO      
 polyethylene glycol (MIRALAX) packet 17 g  17 g Oral DAILY Oleta Branch, DO   17 g at 05/24/21 7947  senna-docusate (PERICOLACE) 8.6-50 mg per tablet 1 Tablet  1 Tablet Oral BID Oleta Branch, DO   1 Tablet at 05/24/21 0097  magnesium hydroxide (MILK OF MAGNESIA) 400 mg/5 mL oral suspension 30 mL  30 mL Oral DAILY PRN Oleta Branch, DO      
 bisacodyL (DULCOLAX) suppository 10 mg  10 mg Rectal DAILY PRN Oleta Branch, DO      
 ondansetron (ZOFRAN ODT) tablet 4 mg  4 mg Oral Q8H PRN Oleta Branch, DO  Or  
 ondansetron (ZOFRAN) injection 4 mg  4 mg IntraVENous Q6H PRN Mariela Lozano DO   4 mg at 05/23/21 0247  famotidine (PEPCID) tablet 20 mg  20 mg Oral DAILY Delmy Cuevas, DO   20 mg at 05/24/21 5268  enoxaparin (LOVENOX) injection 40 mg  40 mg SubCUTAneous DAILY Delmyjeovany Cuevas, DO   40 mg at 05/24/21 4252  melatonin tablet 5 mg  5 mg Oral QHS Rojas, Thu, DO   5 mg at 05/23/21 2153 Allergies Allergen Reactions  Pcn [Penicillins] Rash Review of Systems: A comprehensive review of systems was negative except for that written in the History of Present Illness. Objective: Intake and Output:   
No intake/output data recorded. 05/22 1901 - 05/24 0700 In: 2962.1 [P.O.:200; I.V.:2762.1] Out: 1450 [NMURU:[id number]] Physical Exam:  
General appearance: no distress, appears stated age Head: atraumatic Eyes: conjunctivae/corneas clear. Nose: no discharge Throat: Lips, mucosa dry Neck: supple, symmetrical, trachea midline and no JVD Lungs: clear to auscultation bilaterally Heart: regular rate and rhythm, S1, S2, no pericardial friction rub Abdomen: soft, non-tender. Bowel sounds normal.  
Extremities: No edema Skin:  No rashes or lesions Data Review:  
Recent Results (from the past 24 hour(s)) METABOLIC PANEL, COMPREHENSIVE Collection Time: 05/24/21  6:23 AM  
Result Value Ref Range Sodium 140 136 - 145 mmol/L Potassium 3.2 (L) 3.5 - 5.1 mmol/L Chloride 107 98 - 107 mmol/L  
 CO2 31 21 - 32 mmol/L Anion gap 2 (L) 7 - 16 mmol/L Glucose 129 (H) 65 - 100 mg/dL BUN 29 (H) 8 - 23 MG/DL Creatinine 1.88 (H) 0.6 - 1.0 MG/DL  
 GFR est AA 33 (L) >60 ml/min/1.73m2 GFR est non-AA 27 (L) >60 ml/min/1.73m2 Calcium 10.8 (H) 8.3 - 10.4 MG/DL Bilirubin, total 1.1 0.2 - 1.1 MG/DL  
 ALT (SGPT) 18 12 - 65 U/L  
 AST (SGOT) 40 (H) 15 - 37 U/L Alk. phosphatase 86 50 - 136 U/L Protein, total 5.9 (L) 6.3 - 8.2 g/dL Albumin 3.1 (L) 3.2 - 4.6 g/dL Globulin 2.8 2.3 - 3.5 g/dL  A-G Ratio 1.1 (L) 1.2 - 3.5    
CBC WITH AUTOMATED DIFF  
 Collection Time: 05/24/21  6:23 AM  
Result Value Ref Range WBC 5.4 4.3 - 11.1 K/uL  
 RBC 3.22 (L) 4.05 - 5.2 M/uL  
 HGB 10.2 (L) 11.7 - 15.4 g/dL HCT 28.9 (L) 35.8 - 46.3 % MCV 89.8 79.6 - 97.8 FL  
 MCH 31.7 26.1 - 32.9 PG  
 MCHC 35.3 (H) 31.4 - 35.0 g/dL  
 RDW 13.2 11.9 - 14.6 % PLATELET 131 (L) 117 - 450 K/uL MPV 10.3 9.4 - 12.3 FL ABSOLUTE NRBC 0.00 0.0 - 0.2 K/uL  
 DF AUTOMATED NEUTROPHILS 77 43 - 78 % LYMPHOCYTES 13 13 - 44 % MONOCYTES 9 4.0 - 12.0 % EOSINOPHILS 0 (L) 0.5 - 7.8 % BASOPHILS 0 0.0 - 2.0 % IMMATURE GRANULOCYTES 1 0.0 - 5.0 %  
 ABS. NEUTROPHILS 4.1 1.7 - 8.2 K/UL  
 ABS. LYMPHOCYTES 0.7 0.5 - 4.6 K/UL  
 ABS. MONOCYTES 0.5 0.1 - 1.3 K/UL  
 ABS. EOSINOPHILS 0.0 0.0 - 0.8 K/UL  
 ABS. BASOPHILS 0.0 0.0 - 0.2 K/UL  
 ABS. IMM. GRANS. 0.0 0.0 - 0.5 K/UL Assessment:  
 
Principal Problem: Hypercalcemia (5/21/2021) Active Problems: 
  Diabetes mellitus type II, controlled ([facility] (2/16/2010) Dyslipidemia (2/16/2010) Essential hypertension, benign (2/16/2010) Acute encephalopathy (5/22/2021) Hypokalemia (5/22/2021) Acute renal failure with tubular necrosis ([facility] (5/22/2021) Plan: VIET  In setting of suspected myleoma. Hypercalcemia, anemia, and lytic lesions. The 24 hour urine with only 200mg of urine protein would argue against myeloma as cause of VIET. She has significant risk for ATN with initial volume depletion, ace inhibition, NSAID( mobic), and hypercalcemia, which is a potent renal arteriolar vasoconstrictor. She had a few granular cast, also consistent with an ATN. At this point, I would not recommend kidney biopsy. Continue IVF and get renal ultrasound. Discussed the course of VIET with ATN. Hopefully improvement soon Also pursuing definitive dx of the myeloma. Signed By: Zakia Abraham MD   
 May 24, 2021

## 2021-05-24 NOTE — PROGRESS NOTES
Problem: Falls - Risk of 
Goal: *Absence of Falls Description: Document [place] Fall Risk and appropriate interventions in the flowsheet. Outcome: Not Progressing Towards Goal 
Note: Fall Risk Interventions: 
Mobility Interventions: Assess mobility with egress test, OT consult for ADLs, Patient to call before getting OOB, PT Consult for mobility concerns, PT Consult for assist device competence, Strengthening exercises (ROM-active/passive) Medication Interventions: Assess postural VS orthostatic hypotension, Evaluate medications/consider consulting pharmacy, Patient to call before getting OOB, Teach patient to arise slowly Elimination Interventions: Call light in reach, Elevated toilet seat, Patient to call for help with toileting needs, Stay With Me (per policy), Toilet paper/wipes in reach, Toileting schedule/hourly rounds History of Falls Interventions: Consult care management for discharge planning, Door open when patient unattended, Evaluate medications/consider consulting pharmacy, Investigate reason for fall, Room close to nurse's station, Assess for delayed presentation/identification of injury for 48 hrs (comment for end date), Vital signs minimum Q4HRs X 24 hrs (comment for end date), Utilize gait belt for transfer/ambulation Problem: Altered Thought Process (Adult/Pediatric) Goal: Interventions Outcome: Not Progressing Towards Goal 
  
Problem: Patient Education: Go to Patient Education Activity Goal: Patient/Family Education Outcome: Not Progressing Towards Goal 
  
Problem: Delirium Prevention Goal: Interventions Outcome: Not Progressing Towards Goal 
  
Problem: Delirium Treatment Goal: *Level of consciousness restored to baseline Outcome: Not Progressing Towards Goal 
Goal: *Level of environmental perceptions restored to baseline Outcome: Not Progressing Towards Goal 
Goal: *Sensory perception restored to baseline Outcome: Not Progressing Towards Goal 
Goal: *Emotional stability restored to baseline Outcome: Not Progressing Towards Goal 
Goal: *Functional assessment restored to baseline Outcome: Not Progressing Towards Goal 
Goal: *Absence of falls Outcome: Not Progressing Towards Goal 
Goal: *Will remain free of delirium, CAM Score negative Outcome: Not Progressing Towards Goal 
Goal: *Cognitive status will be restored to baseline Outcome: Not Progressing Towards Goal 
Goal: Interventions Outcome: Not Progressing Towards Goal 
  
Problem: Pressure Injury - Risk of 
Goal: *Prevention of pressure injury Description: Document Isaac Scale and appropriate interventions in the flowsheet. Outcome: Not Progressing Towards Goal 
Note: Pressure Injury Interventions: 
Sensory Interventions: Assess changes in LOC, Assess need for specialty bed, Avoid rigorous massage over bony prominences, Chair cushion, Check visual cues for pain, Discuss PT/OT consult with provider, Float heels, Keep linens dry and wrinkle-free, Minimize linen layers, Monitor skin under medical devices, Maintain/enhance activity level, Pad between skin to skin, Pressure redistribution bed/mattress (bed type), Sit a 90-degree angle/use footstool if needed, Turn and reposition approx. every two hours (pillows and wedges if needed), Use 30-degree side-lying position Activity Interventions: Assess need for specialty bed, Chair cushion, Increase time out of bed, Pressure redistribution bed/mattress(bed type), PT/OT evaluation Mobility Interventions: Assess need for specialty bed, Chair cushion, Float heels, HOB 30 degrees or less, Pressure redistribution bed/mattress (bed type), PT/OT evaluation, Turn and reposition approx. every two hours(pillow and wedges) Nutrition Interventions: Document food/fluid/supplement intake Friction and Shear Interventions: Apply protective barrier, creams and emollients, Feet elevated on foot rest, Foam dressings/transparent film/skin sealants, HOB 30 degrees or less, Lift sheet, Lift team/patient mobility team, Minimize layers, Sit at 90-degree angle

## 2021-05-24 NOTE — PROGRESS NOTES
Shift Summary 
  
Patient admitted 3 days ago with hypercalcemia, dehydration and weakness . Hourly rounds performed on pt. Left wrist IV site remained patent. Patient has been afebrile, drowsiness noted this shift. Patient remained alert and oriented x 2 when awakened,  VSS, see chart, adequate output via cook catheter and 0 BM noted. Fall precautions observed, bed low and locked with exit alarm on, call light within patient and family reach. Son remained at bedside. No acute distress noted.

## 2021-05-24 NOTE — PROGRESS NOTES
SPEECH PATHOLOGY NOTE: 
 
Per hospitalist, patient NPO for bone marrow biopsy. Will follow up for bedside swallow evaluation at later time/date as patient is available and as schedule permits.  
 
 
Luisa Melgoza MS, CCC-SLP

## 2021-05-24 NOTE — PROGRESS NOTES
Hospitalist Progress Note 2021 Admit Date: 2021  8:22 PM  
NAME: Sherry Mckeon :  1940 MRN:  [id number] Attending: Sajan Estrada MD 
PCP:  Liana Woods MD 
 
SUBJECTIVE:  
 
Sherry Mckeon is a [de-identified]years old female with h/o CAD, HTN, HLD, admitted on  for acute encephalopathy secondary to hypercalcemia which is strongly suspected to be from underlying multiple myeloma based on radiographic evidence of translucent skull lesions on CT head. Pt also had acute renal failure with creatinine of 1.5. pt has suppressed PTH intact and vitamin D levels. : 
Patient seen at bedside, comfortably resting and sleeping in bed. Easily arousable but appears to be lethargic. Patient's son and daughter-in-law present at bedside as well. Discussed about plan for IR guided bone marrow biopsy and worsening renal function despite IV fluids. Third daughter-in-law, patient was awake and alert yesterday was able to have her meals. No fever, nausea vomiting or diarrhea. ROS: 
10 point review system was limited as patient is currently not able to answer any questions, information obtained from patient's son. PHYSICAL EXAM  
 
 
Visit Vitals BP (!) 157/73 (BP 1 Location: Left upper arm, BP Patient Position: At rest) Pulse 73 Temp 98.5 °F (36.9 °C) Resp 18 Ht 5' 4\" (1.626 m) Wt 82.2 kg (181 lb 3.5 oz) SpO2 95% BMI 31.11 kg/m² Temp (24hrs), Av.1 °F (36.7 °C), Min:97.2 °F (36.2 °C), Max:98.5 °F (36.9 °C) Oxygen Therapy O2 Sat (%): 95 % (21 0835) Pulse via Oximetry: 70 beats per minute (21 2329) O2 Device: None (Room air) (21 1905) Intake/Output Summary (Last 24 hours) at 2021 1109 Last data filed at 2021 7626 Gross per 24 hour Intake 10 ml Output 850 ml Net -840 ml General: Elderly, NAD, sleeping, on room air Head:  Atraumatic Normocephalic. Eyes:  PERRLA, EOMI, Anicteric. ENT:  No discharges/lesions. Lungs:  CTA Bilaterally. CVS:  Regular rate and rhythm,  No murmur, rub, or gallop, No JVD, No lower   extremity edema. Abdomen: Soft, Non distended, Non tender, Positive bowel sounds. MSK:  No deformities, lesions, Spontaneously moves extremities. Neurologic:  Moving all 4 extremities spontaneously and to painful stimuli. Psychiatry:      Unable to assess. Skin:   No rash/lesions. Good skin turgor Heme/Lymph/Immune:  No petechiae, ecchymoses, overt signs of bleeding or    lymphadenopathy noted. Recent Results (from the past 24 hour(s)) METABOLIC PANEL, COMPREHENSIVE Collection Time: 05/24/21  6:23 AM  
Result Value Ref Range Sodium 140 136 - 145 mmol/L Potassium 3.2 (L) 3.5 - 5.1 mmol/L Chloride 107 98 - 107 mmol/L  
 CO2 31 21 - 32 mmol/L Anion gap 2 (L) 7 - 16 mmol/L Glucose 129 (H) 65 - 100 mg/dL BUN 29 (H) 8 - 23 MG/DL Creatinine 1.88 (H) 0.6 - 1.0 MG/DL  
 GFR est AA 33 (L) >60 ml/min/1.73m2 GFR est non-AA 27 (L) >60 ml/min/1.73m2 Calcium 10.8 (H) 8.3 - 10.4 MG/DL Bilirubin, total 1.1 0.2 - 1.1 MG/DL  
 ALT (SGPT) 18 12 - 65 U/L  
 AST (SGOT) 40 (H) 15 - 37 U/L Alk. phosphatase 86 50 - 136 U/L Protein, total 5.9 (L) 6.3 - 8.2 g/dL Albumin 3.1 (L) 3.2 - 4.6 g/dL Globulin 2.8 2.3 - 3.5 g/dL A-G Ratio 1.1 (L) 1.2 - 3.5    
CBC WITH AUTOMATED DIFF Collection Time: 05/24/21  6:23 AM  
Result Value Ref Range WBC 5.4 4.3 - 11.1 K/uL  
 RBC 3.22 (L) 4.05 - 5.2 M/uL  
 HGB 10.2 (L) 11.7 - 15.4 g/dL HCT 28.9 (L) 35.8 - 46.3 % MCV 89.8 79.6 - 97.8 FL  
 MCH 31.7 26.1 - 32.9 PG  
 MCHC 35.3 (H) 31.4 - 35.0 g/dL  
 RDW 13.2 11.9 - 14.6 % PLATELET 388 (L) 385 - 450 K/uL MPV 10.3 9.4 - 12.3 FL ABSOLUTE NRBC 0.00 0.0 - 0.2 K/uL  
 DF AUTOMATED NEUTROPHILS 77 43 - 78 % LYMPHOCYTES 13 13 - 44 % MONOCYTES 9 4.0 - 12.0 % EOSINOPHILS 0 (L) 0.5 - 7.8 % BASOPHILS 0 0.0 - 2.0 % IMMATURE GRANULOCYTES 1 0.0 - 5.0 %  
 ABS.  NEUTROPHILS 4.1 1.7 - 8.2 K/UL  
 ABS. LYMPHOCYTES 0.7 0.5 - 4.6 K/UL  
 ABS. MONOCYTES 0.5 0.1 - 1.3 K/UL  
 ABS. EOSINOPHILS 0.0 0.0 - 0.8 K/UL  
 ABS. BASOPHILS 0.0 0.0 - 0.2 K/UL  
 ABS. IMM. GRANS. 0.0 0.0 - 0.5 K/UL Imaging Christopher Rm All diagnostic imaging personally reviewed by me. EXAM: Noncontrast CT head. 
  
INDICATION: Pain, fall injury. 
  
COMPARISON: None. 
  
TECHNIQUE: Noncontrast CT images of the head were obtained. Radiation dose 
reduction techniques were used for this study. Our CT scanners use one or all 
of the following:  Automated exposure control, adjustment of the mA or kV 
according to patient size, iterative reconstruction. 
  
FINDINGS: There is mild volume loss and chronic small vessel ischemic changes in 
the white matter. No acute infarct, hemorrhage or mass is identified. There is 
no mass effect, midline shift or depressed fracture. The visualized paranasal 
sinuses and mastoid air cells are clear. There are multiple subcentimeter lucent 
lesions in both sides of the skull. 
  
IMPRESSION 1. No acute intracranial process. 2. Multiple small lucent lesions in the skull, without evidence of extraosseous 
extension. Differential considerations would include metastatic disease and 
multiple myeloma. ASSESSMENT Hospital Problems as of 5/24/2021 Date Reviewed: 6/15/2020 Codes Class Noted - Resolved POA Acute encephalopathy ICD-10-CM: G93.40 ICD-9-CM: 348.30  5/22/2021 - Present Unknown Hypokalemia ICD-10-CM: E87.6 ICD-9-CM: 276.8  5/22/2021 - Present Unknown Acute renal failure with tubular necrosis (HCC) ICD-10-CM: N17.0 ICD-9-CM: 584.5  5/22/2021 - Present Unknown * (Principal) Hypercalcemia ICD-10-CM: I34.54 
ICD-9-CM: 275.42  5/21/2021 - Present Unknown Diabetes mellitus type II, controlled ([facility] [address] ICD-10-CM: E11.9 ICD-9-CM: 250.00  2/16/2010 - Present Yes Dyslipidemia ICD-10-CM: E78.5 ICD-9-CM: 272.4  2/16/2010 - Present Yes Essential hypertension, benign ICD-10-CM: I10 
ICD-9-CM: 401.1  2/16/2010 - Present Yes Plan: #Acute encephalopathy: Improving Secondary to dehydration and hypercalcemia 5/24: 
Per family members patient's mentation is improving Continue correcting the underlying etiology of which is hypercalcemia and dehydration. No underlying infectious etiology suspected currently. #Hypercalcemia: 
5/24: 
Calcium down to 10.8 Currently on IV fluids. Status post 1 dose of Zometa Status post 4 doses of calcitonin Patient was on HCTZ as outpatient that could have also contributed to hypercalcemia however given the radiographic evidence of multiple lytic lesion in her skull multiple myeloma is suspected. Consulted IR for bone marrow biopsy for today Follow-up with SPEP and serum free light chains. Ordered for skeletal survey and urine protein and PEP #Acute renal failure: 
5/24: Baseline is normal 
Creatinine continues to increase from 1.5-1.88, BUN 29. Consulting nephrology to evaluate patient for myeloma kidney. #Hypokalemia: 3.2 
5/24: 
Patient is on oral and IV KCl replacement. Check BMP #Hypomagnesemia: 
5/24: 
Replaced #HTN: 
Continue home medication diltiazem CD. BP is optimally controlled, continue to monitor. #Anxiety depression: 
Continue Lexapro 10 mg p.o. daily #GI prophylaxis with Pepcid #CAD: 
Continue aspirin and Effient with Pravachol. #DVT prophylaxis with Lovenox PT and OT eval 
 
Ordered for speech eval, patient is currently n.p.o. Disposition: Pending until medically stable FULL CODE Shmuel Rodriguez MD

## 2021-05-24 NOTE — PROGRESS NOTES
TRANSFER - IN REPORT: 
 
Verbal report received from KATTY Duenas(name) on [place]  being received from Karen Mendoza RN(unit) for routine progression of care Report consisted of patients Situation, Background, Assessment and  
Recommendations(SBAR). Information from the following report(s) Kardex was reviewed with the receiving nurse. Opportunity for questions and clarification was provided. Assessment completed upon patients arrival to unit and care assumed.

## 2021-05-24 NOTE — PROGRESS NOTES
OT orders received and chart reviewed. Attempted to see pt for OT/PT treatment session this PM. Per chart review, pt off floor to CT. Will follow up and attempt to see pt as schedule permits and as pt is available to be seen. Thank you, Kary Velez OTR/L

## 2021-05-24 NOTE — H&P
Department of Interventional Radiology 
([phone]) History and Physical 
 
Patient:  Liang Avendano MRN:  [id number]  SSN:  [id number] YOB: 1940  Age:  [de-identified] y.o. Sex:  female Primary Care Provider:  Alexey Haywood MD 
Referring Physician:  No ref. provider found Subjective: Chief Complaint:CT evidence of Multiple myeloma History of the Present Illness: The patient is a [de-identified] y.o. female who presented to [facility] with acute encephalopathy. Work up suggests MM based on labs and CT imaging. Patient is pleasantly confused. Admits to back pain. Past Medical History:  
Diagnosis Date  Arthritis  Coronary atherosclerosis of native coronary artery  Diabetes mellitus type II, controlled ([facility] [address] 2/16/2010  Hypertension  Stable angina (HCC)  Stable angina (HCC) Past Surgical History:  
Procedure Laterality Date  HX APPENDECTOMY  HX CHOLECYSTECTOMY [address] CATHETERIZATION  2010 Pt. had a heart attack during this procedure [address] Review of Systems:   
unable to obtain due to patient factors Current Facility-Administered Medications Medication Dose Route Frequency  lactated Ringers infusion  75 mL/hr IntraVENous CONTINUOUS  
 potassium chloride 20 mEq in 100 ml IVPB  20 mEq IntraVENous Q2H  
 potassium chloride (K-DUR, KLOR-CON) SR tablet 40 mEq  40 mEq Oral BID  
 0.9% sodium chloride infusion  25 mL/hr IntraVENous ONCE  
 lidocaine (XYLOCAINE) 20 mg/mL (2 %) injection  mg   mg IntraDERMal Rad Multiple  fentaNYL citrate (PF) injection  mcg   mcg IntraVENous Rad Multiple  midazolam (VERSED) injection 0.5-2 mg  0.5-2 mg IntraVENous Rad Multiple  tuberculin injection 5 Units  5 Units IntraDERMal ONCE  
 aspirin chewable tablet 81 mg  81 mg Oral DAILY  dilTIAZem ER (CARDIZEM CD) capsule 240 mg  240 mg Oral DAILY  escitalopram oxalate (LEXAPRO) tablet 10 mg  10 mg Oral DAILY  prasugreL (EFFIENT) tablet 10 mg  10 mg Oral DAILY  pravastatin (PRAVACHOL) tablet 40 mg  40 mg Oral Q24H  
 sodium chloride (NS) flush 5-40 mL  5-40 mL IntraVENous Q8H  
 sodium chloride (NS) flush 5-40 mL  5-40 mL IntraVENous PRN  
 acetaminophen (TYLENOL) tablet 650 mg  650 mg Oral Q6H PRN Or  
 acetaminophen (TYLENOL) suppository 650 mg  650 mg Rectal Q6H PRN  polyethylene glycol (MIRALAX) packet 17 g  17 g Oral DAILY  senna-docusate (PERICOLACE) 8.6-50 mg per tablet 1 Tablet  1 Tablet Oral BID  magnesium hydroxide (MILK OF MAGNESIA) 400 mg/5 mL oral suspension 30 mL  30 mL Oral DAILY PRN  
 bisacodyL (DULCOLAX) suppository 10 mg  10 mg Rectal DAILY PRN  
 ondansetron (ZOFRAN ODT) tablet 4 mg  4 mg Oral Q8H PRN Or  
 ondansetron (ZOFRAN) injection 4 mg  4 mg IntraVENous Q6H PRN  
 famotidine (PEPCID) tablet 20 mg  20 mg Oral DAILY  enoxaparin (LOVENOX) injection 40 mg  40 mg SubCUTAneous DAILY  melatonin tablet 5 mg  5 mg Oral QHS Allergies Allergen Reactions  Pcn [Penicillins] Rash Family History Problem Relation Age of Onset  Cancer Mother  Cancer Father  Breast Cancer Neg Hx Social History Tobacco Use  Smoking status: Never Smoker  Smokeless tobacco: Never Used Substance Use Topics  Alcohol use: No  
  
 
Objective:    
 
Physical Examination:   
Vitals:  
 05/24/21 0538 05/24/21 0835 05/24/21 1222 05/24/21 1453 BP:  (!) 157/73 (!) 157/80 (!) 177/81 Pulse:  73 73 69 Resp:  18 18 16 Temp:  98.5 °F (36.9 °C) 98.2 °F (36.8 °C) 97.4 °F (36.3 °C) SpO2:  95% 95% 97% Weight: 82.2 kg (181 lb 3.5 oz)   82.1 kg (181 lb) Height:    5' 4\" (1.626 m) Blood pressure (!) 177/81, pulse 69, temperature 97.4 °F (36.3 °C), resp. rate 16, height 5' 4\" (1.626 m), weight 82.1 kg (181 lb), SpO2 97 %. General: alert but encephalopathic, pleasant Lungs: clear, bilaterally, non labored Heart: RRR Extremities: no pitting edema Laboratory:    
Lab Results Component Value Date/Time Sodium 140 05/24/2021 06:23 AM  
 Sodium 142 05/23/2021 06:00 AM  
 Potassium 3.2 (L) 05/24/2021 06:23 AM  
 Potassium 3.7 05/23/2021 06:00 AM  
 Chloride 107 05/24/2021 06:23 AM  
 Chloride 107 05/23/2021 06:00 AM  
 CO2 31 05/24/2021 06:23 AM  
 CO2 29 05/23/2021 06:00 AM  
 Anion gap 2 (L) 05/24/2021 06:23 AM  
 Anion gap 6 (L) 05/23/2021 06:00 AM  
 Glucose 129 (H) 05/24/2021 06:23 AM  
 Glucose 167 (H) 05/23/2021 06:00 AM  
 BUN 29 (H) 05/24/2021 06:23 AM  
 BUN 24 (H) 05/23/2021 06:00 AM  
 Creatinine 1.88 (H) 05/24/2021 06:23 AM  
 Creatinine 1.56 (H) 05/23/2021 06:00 AM  
 GFR est AA 33 (L) 05/24/2021 06:23 AM  
 GFR est AA 41 (L) 05/23/2021 06:00 AM  
 GFR est non-AA 27 (L) 05/24/2021 06:23 AM  
 GFR est non-AA 34 (L) 05/23/2021 06:00 AM  
 Calcium 10.8 (H) 05/24/2021 06:23 AM  
 Calcium 11.8 (H) 05/23/2021 06:00 AM  
 Magnesium 1.5 (L) 05/22/2021 05:50 AM  
 Albumin 3.1 (L) 05/24/2021 06:23 AM  
 Albumin 3.3 05/23/2021 06:00 AM  
 Protein, total 5.9 (L) 05/24/2021 06:23 AM  
 Protein, total 6.2 (L) 05/23/2021 06:00 AM  
 Globulin 2.8 05/24/2021 06:23 AM  
 Globulin 2.9 05/23/2021 06:00 AM  
 A-G Ratio 1.1 (L) 05/24/2021 06:23 AM  
 A-G Ratio 1.1 (L) 05/23/2021 06:00 AM  
 ALT (SGPT) 18 05/24/2021 06:23 AM  
 ALT (SGPT) 21 05/23/2021 06:00 AM  
 
Lab Results Component Value Date/Time WBC 5.4 05/24/2021 06:23 AM  
 WBC 6.0 05/23/2021 06:00 AM  
 HGB 10.2 (L) 05/24/2021 06:23 AM  
 HGB 10.2 (L) 05/23/2021 06:00 AM  
 HCT 28.9 (L) 05/24/2021 06:23 AM  
 HCT 28.1 (L) 05/23/2021 06:00 AM  
 PLATELET 704 (L) 93/59/1402 06:23 AM  
 PLATELET 105 (L) 93/36/3970 06:00 AM  
 
Lab Results Component Value Date/Time  Prothrombin time 10.5 02/15/2010 01:00 PM  
 INR 1.0 02/15/2010 01:00 PM  
 
 
Assessment:  
 
[de-identified] y.o. female with acute encephalopathy on admission and CT evidcence of suspected Multiple Myeloma Plan:  
 
Planned Procedure:  CT guided Bone marrow aspiration and biopsy; moderate sedation Risks, benefits, and alternatives reviewed with patient and her Son Melissa Gates via telephone. They wish for her to proceed with the procedure. Signed By: Marcus Shin NP May 24, 2021

## 2021-05-25 NOTE — PROGRESS NOTES
CM attempted to make contact with Tim Brunner [phone]- patient's son, to discuss discharge planning. CM was unable to reach son. CM left a  requesting call back. CM continues to follow.

## 2021-05-25 NOTE — PROGRESS NOTES
Problem: Falls - Risk of 
Goal: *Absence of Falls Description: Document [place] Fall Risk and appropriate interventions in the flowsheet. Outcome: Not Progressing Towards Goal 
Note: Fall Risk Interventions: 
Mobility Interventions: Assess mobility with egress test 
 
  
 
Medication Interventions: Assess postural VS orthostatic hypotension Elimination Interventions: Call light in reach History of Falls Interventions: Consult care management for discharge planning Problem: Patient Education: Go to Patient Education Activity Goal: Patient/Family Education Outcome: Not Progressing Towards Goal 
  
Problem: Nausea/Vomiting (Adult) Goal: *Absence of nausea/vomiting Outcome: Not Progressing Towards Goal 
  
Problem: Altered Thought Process (Adult/Pediatric) Goal: Interventions Outcome: Not Progressing Towards Goal 
  
Problem: Patient Education: Go to Patient Education Activity Goal: Patient/Family Education Outcome: Not Progressing Towards Goal 
  
Problem: Delirium Prevention Goal: Interventions Outcome: Not Progressing Towards Goal 
  
Problem: Delirium Treatment Goal: *Level of consciousness restored to baseline Outcome: Not Progressing Towards Goal 
Goal: *Level of environmental perceptions restored to baseline Outcome: Not Progressing Towards Goal 
Goal: *Sensory perception restored to baseline Outcome: Not Progressing Towards Goal 
Goal: *Emotional stability restored to baseline Outcome: Not Progressing Towards Goal 
Goal: *Functional assessment restored to baseline Outcome: Not Progressing Towards Goal 
Goal: *Absence of falls Outcome: Not Progressing Towards Goal 
Goal: *Will remain free of delirium, CAM Score negative Outcome: Not Progressing Towards Goal 
Goal: *Cognitive status will be restored to baseline Outcome: Not Progressing Towards Goal 
Goal: Interventions Outcome: Not Progressing Towards Goal 
  
Problem: Pressure Injury - Risk of 
Goal: *Prevention of pressure injury Description: Document Isaac Scale and appropriate interventions in the flowsheet. Outcome: Not Progressing Towards Goal 
Note: Pressure Injury Interventions: 
Sensory Interventions: Assess changes in LOC, Assess need for specialty bed, Avoid rigorous massage over bony prominences, Chair cushion, Check visual cues for pain, Discuss PT/OT consult with provider, Float heels, Keep linens dry and wrinkle-free, Minimize linen layers, Monitor skin under medical devices, Maintain/enhance activity level, Pad between skin to skin, Pressure redistribution bed/mattress (bed type), Sit a 90-degree angle/use footstool if needed, Turn and reposition approx. every two hours (pillows and wedges if needed), Use 30-degree side-lying position Activity Interventions: Assess need for specialty bed, Chair cushion, Increase time out of bed, Pressure redistribution bed/mattress(bed type), PT/OT evaluation Mobility Interventions: Assess need for specialty bed, Chair cushion, Float heels, HOB 30 degrees or less, Pressure redistribution bed/mattress (bed type), PT/OT evaluation, Turn and reposition approx. every two hours(pillow and wedges) Nutrition Interventions: Document food/fluid/supplement intake Friction and Shear Interventions: Apply protective barrier, creams and emollients, Feet elevated on foot rest, Foam dressings/transparent film/skin sealants, HOB 30 degrees or less, Lift sheet, Lift team/patient mobility team, Minimize layers, Sit at 90-degree angle Problem: Patient Education: Go to Patient Education Activity Goal: Patient/Family Education Outcome: Not Progressing Towards Goal

## 2021-05-25 NOTE — PROGRESS NOTES
CM was notified by primary nurse Faraz West, patient's family present at bedside. CM met with patient and family, to obtain SNF choices. Per patient's daughter in law Ines present at bedside, patient's son David Iyer would like referrals sent to 1. [address] [facility] 2. [facility] Referrals sent. CM will follow up. CM continues to follow.

## 2021-05-25 NOTE — PROGRESS NOTES
Hospitalist Progress Note 2021 Admit Date: 2021  8:22 PM  
NAME: Ricardo Peng :  1940 MRN:  [id number] Attending: Colletta Charnley, MD 
PCP:  Lynn Woods MD 
 
SUBJECTIVE:  
 
Ricardo Peng is a [de-identified]years old female with h/o CAD, HTN, HLD, admitted on  for acute encephalopathy secondary to hypercalcemia which is strongly suspected to be from underlying multiple myeloma based on radiographic evidence of translucent skull lesions on CT head. Pt also had acute renal failure with creatinine of 1.5. pt has suppressed PTH intact and vitamin D levels. : 
Pt resting in bed, appearing comfortable and in no acute distress. Patient's son is present as well, discussed about pending bone marrow biopsy results. No nausea vomiting. Patient is still confused per family. No overnight events. No constipation reported. ROS: 
10 point review system was limited as patient is currently not able to answer any questions, information obtained from patient's son. PHYSICAL EXAM  
 
 
Visit Vitals BP (!) 155/65 (BP 1 Location: Left arm, BP Patient Position: At rest) Pulse 68 Temp 98.8 °F (37.1 °C) Resp 16 Ht 5' 4\" (1.626 m) Wt 82.2 kg (181 lb 3.2 oz) SpO2 98% BMI 31.10 kg/m² Temp (24hrs), Av.3 °F (36.8 °C), Min:97.4 °F (36.3 °C), Max:98.8 °F (37.1 °C) Oxygen Therapy O2 Sat (%): 98 % (21 0739) Pulse via Oximetry: 60 beats per minute (21 1622) O2 Device: None (Room air) (21 1915) O2 Flow Rate (L/min): 3 l/min (21 1523) ETCO2 (mmHg): 42 mmHg (21 1543) Intake/Output Summary (Last 24 hours) at 2021 6061 Last data filed at 2021 2805 Gross per 24 hour Intake 1739.17 ml Output 100 ml Net 1639.17 ml General: Elderly, NAD, lethargic, alert awake, on room air Head:  Atraumatic Normocephalic. Eyes:  PERRLA, EOMI, Anicteric. ENT:  No discharges/lesions. Lungs:  CTA Bilaterally.   
CVS:  Regular rate and rhythm,  No murmur, rub, or gallop, No JVD, No lower   extremity edema. Abdomen: Soft, Non distended, Non tender, Positive bowel sounds. MSK:  No deformities, lesions, Spontaneously moves extremities. Neurologic:  Moving all 4 extremities spontaneously, answering questions Psychiatry:      Flat affect Skin:   No rash/lesions. Good skin turgor Heme/Lymph/Immune:  No petechiae, ecchymoses, overt signs of bleeding or    lymphadenopathy noted. Recent Results (from the past 24 hour(s)) BONE MARROW PREP & LEE STAIN Collection Time: 05/24/21  1:40 PM  
Result Value Ref Range Bone Marrow Prep & Jenita Cord FOR HEMATOLOGY SERVICES RENDERED    
GLUCOSE, POC Collection Time: 05/24/21  2:55 PM  
Result Value Ref Range Glucose (POC) 144 (H) 65 - 100 mg/dL Performed by Robb Arreola CALCIUM Collection Time: 05/25/21  6:22 AM  
Result Value Ref Range Calcium 10.1 8.3 - 10.4 MG/DL Imaging Nitesh Fletcher All diagnostic imaging personally reviewed by me. EXAM: Noncontrast CT head. 
  
INDICATION: Pain, fall injury. 
  
COMPARISON: None. 
  
TECHNIQUE: Noncontrast CT images of the head were obtained. Radiation dose 
reduction techniques were used for this study. Our CT scanners use one or all 
of the following:  Automated exposure control, adjustment of the mA or kV 
according to patient size, iterative reconstruction. 
  
FINDINGS: There is mild volume loss and chronic small vessel ischemic changes in 
the white matter. No acute infarct, hemorrhage or mass is identified. There is 
no mass effect, midline shift or depressed fracture. The visualized paranasal 
sinuses and mastoid air cells are clear. There are multiple subcentimeter lucent 
lesions in both sides of the skull. 
  
IMPRESSION 1. No acute intracranial process. 2. Multiple small lucent lesions in the skull, without evidence of extraosseous 
extension.  Differential considerations would include metastatic disease and 
multiple myeloma. ASSESSMENT Hospital Problems as of 5/25/2021 Date Reviewed: 6/15/2020 Codes Class Noted - Resolved POA Acute encephalopathy ICD-10-CM: G93.40 ICD-9-CM: 348.30  5/22/2021 - Present Unknown Hypokalemia ICD-10-CM: E87.6 ICD-9-CM: 276.8  5/22/2021 - Present Unknown Acute renal failure with tubular necrosis (HCC) ICD-10-CM: N17.0 ICD-9-CM: 584.5  5/22/2021 - Present Unknown * (Principal) Hypercalcemia ICD-10-CM: J65.27 
ICD-9-CM: 275.42  5/21/2021 - Present Unknown Diabetes mellitus type II, controlled ([facility] ICD-10-CM: E11.9 ICD-9-CM: 250.00  2/16/2010 - Present Yes Dyslipidemia ICD-10-CM: E78.5 ICD-9-CM: 272.4  2/16/2010 - Present Yes Essential hypertension, benign ICD-10-CM: I10 
ICD-9-CM: 401.1  2/16/2010 - Present Yes Plan: #Acute encephalopathy: Improving Secondary to dehydration and hypercalcemia 5/25: 
Per family members patient's mentation is improving Continue correcting the underlying etiology of which is hypercalcemia and dehydration. No underlying infectious etiology suspected currently. #Hypercalcemia: Resolved 5/25: 
Calcium down to 10.1 Currently on IV fluids. Status post 1 dose of Zometa Status post 4 doses of calcitonin Patient was on HCTZ as outpatient that could have also contributed to hypercalcemia however given the radiographic evidence of multiple lytic lesion in her skull multiple myeloma is suspected. Status post IR guided bone marrow biopsy on 5/25, pending pathology results. Follow-up with SPEP and serum free light chains. Ordered for skeletal survey and urine protein and PEP #Acute renal failure: Resolving 5/25: Baseline is normal 
Creatinine continues to increase from 1.5-1.88, BUN 29. Status post nephrology evaluation. Low concerns for myeloma kidney however ATN is a possibility. Creatinine down from 1.8-1.7. Plan to continue IV fluids for now.  
Renal ultrasound unremarkable #Hypokalemia: Resolved 5/25: 
3.8 today Continue supplementation as needed via #Hypomagnesemia: 
5/25: 
Replaced #HTN: 
Continue home medication diltiazem CD. BP is optimally controlled, continue to monitor. #Anxiety depression: 
Continue Lexapro 10 mg p.o. daily #GI prophylaxis with Pepcid #CAD: 
Continue aspirin and Effient with Pravachol. #DVT prophylaxis with Lovenox PT and OT eval 
 
Ordered for speech eval, patient is currently n.p.o. Disposition: Pending until medically stable FULL CODE Loc Johnson MD

## 2021-05-25 NOTE — PROGRESS NOTES
SPEECH LANGUAGE PATHOLOGY: DYSPHAGIA- Initial Assessment and Discharge NAME/AGE/GENDER: Mary Amanda is a [de-identified] y.o. female DATE: 5/25/2021 PRIMARY DIAGNOSIS: Hypercalcemia [E83.52] ICD-10: Treatment Diagnosis: R13.12 Dysphagia, Oropharyngeal Phase RECOMMENDATIONS  
DIET:  
 Regular Consistency  Thin Liquids MEDICATIONS: With liquid or floated in puree ASPIRATION PRECAUTIONS · Slow rate of intake · Small bites/sips · Upright at 90 degrees during meal 
  
COMPENSATORY STRATEGIES/MODIFICATIONS · None RECOMMENDATIONS for CONTINUED SPEECH THERAPY:  
No further speech therapy indicated at this time. ASSESSMENT Patient presents with oropharyngeal swallow function that is within normal limits. No s/sx of dysphagia identified. Recommend continue regular consistency diet/thin liquids. Meds with liquid rinse. EDUCATION: 
· Recommendations discussed with Patient, Family and RN 
CONTINUATION OF SKILLED SERVICES/MEDICAL NECESSITY: 
 No additional speech services warranted. REHABILITATION POTENTIAL FOR STATED GOALS: Excellent PLAN   
FREQUENCY/DURATION: No further speech therapy indicated at this time as oropharyngeal swallow function is within normal limits. - Recommendations for next treatment session: No additional speech therapy indicated at this time. SUBJECTIVE Patient alert upright in bed for session. Friendly and pleasant. Hard of hearing. Family at bedside. Oxygen Device: room air Pain: Pain Scale 1: Numeric (0 - 10) Pain Intensity 1: 0 History of Present Injury/Illness: Ms. Anay Louie  has a past medical history of Arthritis, Coronary atherosclerosis of native coronary artery, Diabetes mellitus type II, controlled ([address] (2/16/2010), Hypertension, Stable angina ([address] and Stable angina ([address]  She also has no past medical history of Arrhythmia, Asthma, Autoimmune disease ([address] Cancer ([address] Chronic kidney disease, Coagulation defects, COPD, GERD (gastroesophageal reflux disease), Heart failure ([facility] Liver disease, Other ill-defined conditions(799.89), Psychiatric disorder, PUD (peptic ulcer disease), Seizures ([address] Stroke ([facility] Thromboembolus ([facility] or Thyroid disease. . She also  has a past surgical history that includes hx appendectomy; hx cholecystectomy; hx hysterectomy (1974); pr lap,cholecystectomy (2000); and hx heart catheterization (2010). PRECAUTIONS/ALLERGIES: Pcn [penicillins] Problem List:  (Impairments causing functional limitations): 1. Oropharyngeal dysphagia- No symptoms identified Previous Dysphagia: NONE REPORTED RN reports single occurrence of coughing with thin liquids when taking meds when drowsy after taking [place] Diet Prior to Evaluation: Regular/thin Orientation: 
Person Place Time Situation Cognitive-Linguistic Screening: 
Patient is alert and follows commands given repetition due to hearing impairment. Oriented x2. No word finding deficits noted. OBJECTIVE Oral Motor:  
· Labial: No impairment · Dentition: Intact and Natural 
· Oral Hygiene: Adequate · Lingual: No impairment Dysphagia evaluation: 
 Patient consumed trials of thin by cup/straw/serial sips, mixed, and solid consistency trials without overt s/sx airway compromise. Functional oral prep and clearance with all textures. Tool Used: Dysphagia Outcome and Severity Scale (VIVEK) Score Comments Normal Diet  [x] 7 With no strategies or extra time needed Functional Swallow  [] 6 May have mild oral or pharyngeal delay Mild Dysphagia  [] 5 Which may require one diet consistency restricted Mild-Moderate Dysphagia  [] 4 With 1-2 diet consistencies restricted Moderate Dysphagia  [] 3 With 2 or more diet consistencies restricted Moderate-Severe Dysphagia  [] 2 With partial PO strategies (trials with ST only) Severe Dysphagia  [] 1 With inability to tolerate any PO safely Score:  Initial: 7 Most Recent: x (Date 05/25/21 ) Interpretation of Tool: The Dysphagia Outcome and Severity Scale (VIVEK) is a simple, easy-to-use, 7-point scale developed to systematically rate the functional severity of dysphagia based on objective assessment and make recommendations for diet level, independence level, and type of nutrition. Current Medications: No current facility-administered medications on file prior to encounter. Current Outpatient Medications on File Prior to Encounter Medication Sig Dispense Refill  Aspirin, Buffered 81 mg tab Take  by mouth.  meloxicam (MOBIC) 15 mg tablet Take 15 mg by mouth daily.  escitalopram oxalate (LEXAPRO) 10 mg tablet Take 10 mg by mouth daily.  Prasugrel (EFFIENT) 10 mg tablet Take 1 Tab by mouth daily. 30 Tab 11  
 diltiazem CD (CARDIZEM CD) 240 mg ER capsule Take 240 mg by mouth daily.  pravastatin (PRAVACHOL) 40 mg tablet Take 40 mg by mouth daily.  LISINOPRIL/HYDROCHLOROTHIAZIDE (ZESTORETIC PO) Take  by mouth.  nitroglycerin (NITROSTAT) 0.4 mg SL tablet 1 Tab by SubLINGual route every five (5) minutes as needed for Chest Pain. (Patient not taking: Reported on 5/22/2021) 1 Bottle 2 INTERDISCIPLINARY COLLABORATION: RN After treatment position/precautions: · Upright in bed · Family at bedside · RN notified Total Treatment Duration:  
Time In: 4175 Time Out: 3464 Brenda Townsend MS, CCC-SLP

## 2021-05-25 NOTE — PROGRESS NOTES
Shift Summary 
  
Patient admitted 4 days ago with hypercalcemia, dehydration and weakness . Hourly rounds performed on pt. Left wrist IV site remained patent. Patient has been afebrile, drowsiness noted this shift. Patient remained alert and oriented x 2 when awakened,  VSS, see chart,some output and 3 BM noted. Fall precautions observed, bed low and locked with exit alarm on, call light within patient and family reach. Son remained at bedside. No acute distress noted.

## 2021-05-25 NOTE — PROGRESS NOTES
Hourly rounds completed, pt denies needs at this time. Pt doing well today and more alert. SLP evaluated and pt with no deficits. Pt swallowing all pills except K+ which has been dissolved in apple juice. Pt did receive stool softeners and has had multiple extra large BMs this shift, MD aware and orders to stop softeners. Family at bedside most of the day.

## 2021-05-25 NOTE — PROGRESS NOTES
ACUTE PHYSICAL THERAPY GOALS: 
(Developed with and agreed upon by patient and/or caregiver. ) LTG: 
(1.)Ms. Kendell Arroyo will move from supine to sit and sit to supine , scoot up and down and roll side to side in bed with MODERATE ASSIST within 7 treatment day(s). (2.)Ms. Kendell Arroyo will transfer from bed to chair and chair to bed with MODERATE ASSIST using the least restrictive device within 7 treatment day(s). (3.)Ms. Kendell Arroyo will ambulate with MODERATE ASSIST for 20 feet with the least restrictive device within 7 treatment day(s). (4.)Ms. Kendell Arroyo will tolerate at least 23 min of dynamic standing activity to assist standing ADLs with the least restrictive device within 7 treatment days PHYSICAL THERAPY: Daily Note and PM Treatment Day # 2 Ricardo Peng is a [de-identified] y.o. female PRIMARY DIAGNOSIS: Hypercalcemia Hypercalcemia [E83.52] ASSESSMENT:  
 
REHAB RECOMMENDATIONS: CURRENT LEVEL OF FUNCTION: 
(Most Recently Demonstrated) Recommendation to date pending progress: 
Setting:  Short-term Rehab Equipment:  To Be Determined Bed Mobility:  Moderate Assistance x 2 Sit to Stand:  Moderate Assistance x 2 Transfers:  Not tested Gait/Mobility:  Moderate Assistance x 2 to min assist x 2  
 
ASSESSMENT: 
Ms. Kendell Arroyo is very pleasant, she is supine and her son and daughter in law present. She worked on bed mobility, standing, scooting, and ambulation using a rolling walker. She was perseverating on going into the bathroom this afternoon. She returned supine and was left with needs in reach. Will continue with POC. SUBJECTIVE:  
Ms. Kendell Arroyo states, \"I am going to go to the bathroom. \" SOCIAL HISTORY/ LIVING ENVIRONMENT: lives alone, 2 recent falls, mod I w/ RW, adult son lives in town Home Environment: Private residence # Steps to Enter: 0 (ramp) One/Two Story Residence: One story Living Alone: Yes Support Systems: Child(ericka) OBJECTIVE:  
 
PAIN: VITAL SIGNS: LINES/DRAINS:  
Pre Treatment:   
Post Treatment: not rated   IV and Purewick O2 Device: None (Room air) MOBILITY: I Mod I S SBA CGA Min Mod Max Total  NT x2 Comments:  
Bed Mobility Rolling [] [] [] [] [] [] [] [] [] [] [] Supine to Sit [] [] [] [] [] [] [x] [] [] [] [x] Scooting [] [] [] [] [] [] [x] [] [] [] [] Sit to Supine [] [] [] [] [] [] [x] [] [] [] [x] Transfers Sit to Stand [] [] [] [] [] [] [x] [] [] [] [x] Bed to Chair [] [] [] [] [] [] [] [] [] [] []   
Stand to Sit [] [] [] [] [] [] [x] [] [] [] [x] I=Independent, Mod I=Modified Independent, S=Supervision, SBA=Standby Assistance, CGA=Contact Guard Assistance,  
Min=Minimal Assistance, Mod=Moderate Assistance, Max=Maximal Assistance, Total=Total Assistance, NT=Not Tested BALANCE: Good Fair+ Fair Fair- Poor NT Comments Sitting Static [] [x] [] [] [] [] Sitting Dynamic [] [x] [] [] [] []   
         
Standing Static [] [] [x] [] [] []   
Standing Dynamic [] [] [x] [] [] [] GAIT: I Mod I S SBA CGA Min Mod Max Total  NT x2 Comments:  
Level of Assistance [] [] [] [] [] [x] [x] [] [] [] [x] Distance 6', 2'   
DME Rolling Walker Gait Quality Short steps, slightly flexed posture Weightbearing Status N/A I=Independent, Mod I=Modified Independent, S=Supervision, SBA=Standby Assistance, CGA=Contact Guard Assistance,  
Min=Minimal Assistance, Mod=Moderate Assistance, Max=Maximal Assistance, Total=Total Assistance, NT=Not Tested PLAN:  
FREQUENCY/DURATION: PT Plan of Care: 3 times/week for duration of hospital stay or until stated goals are met, whichever comes first. 
TREATMENT:  
 
TREATMENT:  
($$ Therapeutic Activity: 23-37 mins    ) Co-Treatment PT/OT necessary due to patient's decreased overall endurance/tolerance levels, as well as need for high level skilled assistance to complete functional transfers/mobility and functional tasks Therapeutic Activity (26 Minutes):  Therapeutic activity included Supine to Sit, Sit to Supine, Scooting, Ambulation on level ground, Sitting balance  and Standing balance to improve functional Mobility, Strength and Activity tolerance. TREATMENT GRID: 
N/A 
 
AFTER TREATMENT POSITION/PRECAUTIONS: 
Alarm Activated, Bed, Needs within reach, RN notified and Visitors at bedside INTERDISCIPLINARY COLLABORATION: 
RN/PCT, PT/PTA and OT/RECINOS TOTAL TREATMENT DURATION: 
PT Patient Time In/Time Out Time In: 1096 Time Out: [address], PTA

## 2021-05-25 NOTE — PROGRESS NOTES
[facility] Admission Date: 5/21/2021 Renal Daily Progress Note: 5/25/2021 The patient's chart is reviewed and the patient is discussed with the staff. Follow up VIET, hypercalcemia Subjective:  
Patient seen and examined on rounds, son and daughter in law at the bedside, pt hard of hearing reports BM today constipation better, good uop. ROS: 
General: no fever/chills, appetite better CV: no CP Lung: no SOB, no cough GI: no N/V/D 
: no dysuria uop via purwik Ext: no edema Current Facility-Administered Medications Medication Dose Route Frequency  lactated Ringers infusion  75 mL/hr IntraVENous CONTINUOUS  
 potassium chloride (K-DUR, KLOR-CON) SR tablet 40 mEq  40 mEq Oral BID  aspirin chewable tablet 81 mg  81 mg Oral DAILY  dilTIAZem ER (CARDIZEM CD) capsule 240 mg  240 mg Oral DAILY  escitalopram oxalate (LEXAPRO) tablet 10 mg  10 mg Oral DAILY  prasugreL (EFFIENT) tablet 10 mg  10 mg Oral DAILY  pravastatin (PRAVACHOL) tablet 40 mg  40 mg Oral Q24H  
 sodium chloride (NS) flush 5-40 mL  5-40 mL IntraVENous Q8H  
 sodium chloride (NS) flush 5-40 mL  5-40 mL IntraVENous PRN  
 acetaminophen (TYLENOL) tablet 650 mg  650 mg Oral Q6H PRN Or  
 acetaminophen (TYLENOL) suppository 650 mg  650 mg Rectal Q6H PRN  polyethylene glycol (MIRALAX) packet 17 g  17 g Oral DAILY  senna-docusate (PERICOLACE) 8.6-50 mg per tablet 1 Tablet  1 Tablet Oral BID  magnesium hydroxide (MILK OF MAGNESIA) 400 mg/5 mL oral suspension 30 mL  30 mL Oral DAILY PRN  
 bisacodyL (DULCOLAX) suppository 10 mg  10 mg Rectal DAILY PRN  
 ondansetron (ZOFRAN ODT) tablet 4 mg  4 mg Oral Q8H PRN Or  
 ondansetron (ZOFRAN) injection 4 mg  4 mg IntraVENous Q6H PRN  
 famotidine (PEPCID) tablet 20 mg  20 mg Oral DAILY  enoxaparin (LOVENOX) injection 40 mg  40 mg SubCUTAneous DAILY  melatonin tablet 5 mg  5 mg Oral QHS Objective:  
 
Vitals: 05/25/21 2189 05/25/21 0526 05/25/21 0739 05/25/21 1034 BP:  (!) 156/80 (!) 155/65 (!) 149/75 Pulse:  74 68 66 Resp:  16 16 16 Temp:  98 °F (36.7 °C) 98.8 °F (37.1 °C) 97.9 °F (36.6 °C) SpO2:  96% 98% 96% Weight: 82.2 kg (181 lb 3.2 oz) Height:      
 
Intake and Output:  
05/23 1901 - 05/25 0700 In: 1749.2 [I.V.:1749.2] Out: 450 [Urine:450] No intake/output data recorded. Physical Exam:  
Constitutional:  the patient is well developed and in no acute distress, alert and oriented HEENT:  Sclera clear, pupils equal, oral mucosa moist, hard of hearing Lungs: clear lung sounds bilaterally Cardiovascular:  Regular rate, S1, S2,  No Rub Abd/GI: soft and non-tender; with positive bowel sounds. Ext: warm without cyanosis. There is no lower leg edema. Skin:  no jaundice or rashes Neuro: no gross neuro deficits Psychiatric: Calm. LAB Recent Labs  
  05/24/21 
0623 05/23/21 
0600 WBC 5.4 6.0 HGB 10.2* 10.2* HCT 28.9* 28.1*  
* 128* Recent Labs  
  05/25/21 
0622 05/24/21 
0623 05/23/21 
0600  140 142  
K 3.8 3.2* 3.7 * 107 107 CO2 29 31 29 * 129* 167* BUN 29* 29* 24* CREA 1.71* 1.88* 1.56* ALB  --  3.1* 3.3 No results for input(s): PH, PCO2, PO2, HCO3 in the last 72 hours. Assessment:  (Medical Decision Making) Hospital Problems  Date Reviewed: 6/15/2020 Codes Class Noted POA Acute encephalopathy ICD-10-CM: G93.40 ICD-9-CM: 348.30  5/22/2021 Unknown Hypokalemia ICD-10-CM: E87.6 ICD-9-CM: 276.8  5/22/2021 Unknown Acute renal failure with tubular necrosis (HCC) ICD-10-CM: N17.0 ICD-9-CM: 584.5  5/22/2021 Unknown * (Principal) Hypercalcemia ICD-10-CM: P58.56 
ICD-9-CM: 275.42  5/21/2021 Unknown Diabetes mellitus type II, controlled ([facility] ICD-10-CM: E11.9 ICD-9-CM: 250.00  2/16/2010 Yes Dyslipidemia ICD-10-CM: E78.5 ICD-9-CM: 272.4  2/16/2010 Yes  Essential hypertension, benign ICD-10-CM: I10 
ICD-9-CM: 401.1  2/16/2010 Yes Plan:  (Medical Decision Making) 1. VIET likely pre renal vs ATN in the setting of NSAID mobic, ACEi, volume depletion, granular casts on UA 
- Cr improving down to 1.71, non oliguric, renal US normal size kidneys without evidence of obstructive nephropathy  
-trending renal indices with strict I&O 2. Hypercalcemia- s/p Zometa and calcitonin 
-CT with evidence of bone lesions in the skull, MM work up in progress - K/L ratio 0.99, SPEP pending, flow cytometry and s/p bone marrow aspiration and bx 5/24 3. Hypokalemia/hypomag replete per hosp 4. HTN- continue diltiazem CD Severiano Dart, NP

## 2021-05-25 NOTE — PROGRESS NOTES
ACUTE OT GOALS: 
(Developed with and agreed upon by patient and/or caregiver.) 1. Patient will complete upper body bathing and dressing with supervision and adaptive equipment as needed. 2. Patient will complete toileting with moderate assistance. 3. Patient will tolerate 30 minutes of OT treatment with 2-3 rest breaks to increase activity tolerance for ADLs. 4. Patient will complete functional transfers with minimal assistance and adaptive equipment as needed. 5. Patient will complete functional mobility for short household distances with minimal assistance and good safety awareness. Timeframe: 7 visits OCCUPATIONAL THERAPY ASSESSMENT: Initial Assessment and Daily Note OT Treatment Day # 1 Janiya Kimble is a [de-identified] y.o. female PRIMARY DIAGNOSIS: Hypercalcemia Hypercalcemia [E83.52] Reason for Referral: ICD-10: Treatment Diagnosis: Generalized Muscle Weakness (M62.81) Other lack of cordination (R27.8) History of falling (Z91.81) Low Back Pain (M54.5) [place] / Product Type: Medicare /  
ASSESSMENT:  
 
REHAB RECOMMENDATIONS:  
Recommendation to date pending progress: 
Setting:  Short-term Rehab Equipment:  To Be Determined PRIOR LEVEL OF FUNCTION: 
(Prior to Hospitalization)  INITIAL/CURRENT LEVEL OF FUNCTION: 
(Based on today's evaluation) Bathing:  Modified Independent Dressing:  Modified Independent Feeding/Grooming:  Modified Independent Toileting:  Modified Independent Functional Mobility:  Modified Independent Bathing:  Moderate Assistance Dressing:  Maximal Assistance Feeding/Grooming:  Moderate Assistance Toileting:  Maximal Assistance Functional Mobility:  Moderate Assistance x 2  
 
ASSESSMENT: 
Ms. Sonia Rogers presents to the hospital with hypercalcemia and is getting further oncology work-up.  Pt is confused this pm but extremely pleasant and son/daughter-in-law at bedside are supportive. Pt has chronic back pain but is not able to rate her pain today. Pt tolerated bed mobility, standing edge of bed, and taking a few steps at the edge of the bed using a RW and moderate assistance x 2. Pt perseverating on going into the bathroom this afternoon. Pt assisted back to the bed at the end of session. Pt is currently functioning below her baseline and will benefit from OT services to address stated goals and plan of care. SUBJECTIVE:  
Ms. Arelis Cruz states, \"I need to go to the bathroom. \" SOCIAL HISTORY/LIVING ENVIRONMENT: lives alone at baseline and is typically modified independent using a rolling walker; pt has had a few falls recently; pt typically mod I with ADL and has a walk-in shower with grab bars and a shower chair; pt still drives and was recently going to OP PT Home Environment: Private residence # Steps to Enter: 0 (ramp) One/Two Story Residence: One story Living Alone: Yes Support Systems: Child(ericka) OBJECTIVE:  
 
PAIN: VITAL SIGNS: LINES/DRAINS:  
Pre Treatment: Pain Screen Pain Scale 1: Numeric (0 - 10) Pain Intensity 1: 0 Post Treatment: 0   IV and Purewick O2 Device: None (Room air) GROSS EVALUATION: 
B UE Within Functional Limits Abnormal/ Functional Abnormal/ Non-Functional (see comments) Not Tested Comments: AROM [] [x] [] [] Somewhat limited at the shoulders PROM [x] [] [] [] Strength [] [x] [] [] B UE Balance [] [x] [] [] Fair sitting; fair- standing Posture [] [x] [] [] Sensation [] [] [] [x] Coordination [] [x] [] [] Tone [] [] [] [x] Edema [] [] [] [x] Activity Tolerance [] [x] [] []   
 [] [] [] [] COGNITION/ 
PERCEPTION: Intact Impaired  
(see comments) Comments:  
Orientation [] [x] Vision [x] [] Hearing [x] [] Judgment/ Insight [] [x] Attention [] [x] Memory [] [x] Command Following [x] [] Emotional Regulation [x] []   
 [] [] ACTIVITIES OF DAILY LIVING: I Mod I S SBA CGA Min Mod Max Total NT Comments BASIC ADLs:             
Bathing/ Showering [] [] [] [] [] [] [] [] [] [x] Toileting [] [] [] [] [] [] [] [] [] [x] Dressing [] [] [] [] [] [] [] [] [] [x] Feeding [] [] [] [] [] [] [] [] [] [x] Grooming [] [] [] [] [] [] [] [] [] [x] Personal Device Care [] [] [] [] [] [] [] [] [] [x] Functional Mobility [] [] [] [] [] [] [x] [] [] [] X 2  
I=Independent, Mod I=Modified Independent, S=Supervision, SBA=Standby Assistance, CGA=Contact Guard Assistance,  
Min=Minimal Assistance, Mod=Moderate Assistance, Max=Maximal Assistance, Total=Total Assistance, NT=Not Tested MOBILITY: I Mod I S SBA CGA Min Mod Max Total  NT x2 Comments:  
Supine to sit [] [] [] [] [] [] [x] [] [] [] [x] Sit to supine [] [] [] [] [] [] [x] [] [] [] [x] Sit to stand [] [] [] [] [] [] [x] [] [] [] [x] Bed to chair [] [] [] [] [] [] [x] [] [] [] [x] I=Independent, Mod I=Modified Independent, S=Supervision, SBA=Standby Assistance, CGA=Contact Guard Assistance,  
Min=Minimal Assistance, Mod=Moderate Assistance, Max=Maximal Assistance, Total=Total Assistance, NT=Not Tested [facility] Daily Activity Inpatient Short Form How much help from another person does the patient currently need. .. Total A Lot A Little None 1. Putting on and taking off regular lower body clothing? [x] 1   [] 2   [] 3   [] 4  
2. Bathing (including washing, rinsing, drying)? [] 1   [x] 2   [] 3   [] 4  
3. Toileting, which includes using toilet, bedpan or urinal?   [] 1   [x] 2   [] 3   [] 4  
4. Putting on and taking off regular upper body clothing? [] 1   [x] 2   [] 3   [] 4  
5. Taking care of personal grooming such as brushing teeth? [] 1   [x] 2   [] 3   [] 4  
6. Eating meals? [] 1   [] 2   [x] 3   [] 4  
© 2007, Trustees of [address] [place] [address] [address] [id number], under license to [facility]. All rights reserved Score:  Initial: 12 Most Recent: X (Date: -- ) Interpretation of Tool: Represents activities that are increasingly more difficult (i.e. Bed mobility, Transfers, Gait). PLAN:  
FREQUENCY/DURATION: OT Plan of Care: 3 times/week for duration of hospital stay or until stated goals are met, whichever comes first. 
 
PROBLEM LIST:  
(Skilled intervention is medically necessary to address:) 1. Decreased ADL/Functional Activities 2. Decreased Activity Tolerance 3. Decreased AROM/PROM 4. Decreased Balance 5. Decreased Cognition 6. Decreased Coordination 7. Decreased Gait Ability 8. Decreased Strength 9. Decreased Transfer Abilities 10. Increased Pain INTERVENTIONS PLANNED:  
(Benefits and precautions of occupational therapy have been discussed with the patient.) 1. Self Care Training 2. Therapeutic Activity 3. Therapeutic Exercise/HEP 4. Neuromuscular Re-education 5. Education TREATMENT:  
 
EVALUATION: Moderate Complexity : (Untimed Charge) TREATMENT:  
( $$ Neuromuscular Re-Education: 8-22 mins   ) Co-Treatment PT/OT necessary due to patient's decreased overall endurance/tolerance levels, as well as need for high level skilled assistance to complete functional transfers/mobility and functional tasks Neuromuscular Re-education (15 Minutes): Neuromuscular Re-education included Balance Training, Coordination training, Postural training, Sitting balance training and Standing balance training to improve Balance, Coordination, Functional Mobility and Postural Control. TREATMENT GRID: 
N/A 
 
AFTER TREATMENT POSITION/PRECAUTIONS: 
Alarm Activated, Bed, Needs within reach, RN notified and Visitors at bedside INTERDISCIPLINARY COLLABORATION: 
RN/PCT, PT/PTA and OT/RECINOS TOTAL TREATMENT DURATION: 
OT Patient Time In/Time Out Time In: 9966 Time Out: 1428 Rudy Wakefield OT

## 2021-05-26 NOTE — PROGRESS NOTES
Pt resting in bed with son at bedside all shift. Hourly round completed. No needs verbalized at this time. Bed in low/locked position, alarm on, side rails up x3 with call bell in reach. Will continue to monitor and report to oncoming shift.

## 2021-05-26 NOTE — PROGRESS NOTES
ACUTE OT GOALS: 
(Developed with and agreed upon by patient and/or caregiver.) 1. Patient will complete upper body bathing and dressing with supervision and adaptive equipment as needed. 2. Patient will complete toileting with moderate assistance. 3. Patient will tolerate 30 minutes of OT treatment with 2-3 rest breaks to increase activity tolerance for ADLs. 4. Patient will complete functional transfers with minimal assistance and adaptive equipment as needed. 5. Patient will complete functional mobility for short household distances with minimal assistance and good safety awareness.  
  
Timeframe: 7 visits OCCUPATIONAL THERAPY: Daily Note OT Treatment Day # 2 Radha Doshi is a [de-identified] y.o. female PRIMARY DIAGNOSIS: Hypercalcemia Hypercalcemia [E83.52] [place] / Product Type: Medicare /  
ASSESSMENT:  
 
REHAB RECOMMENDATIONS: CURRENT LEVEL OF FUNCTION: 
(Most Recently Demonstrated) Recommendation to date pending progress: 
Setting:  Short-term Rehab Equipment:  To Be Determined Bathing: 
 Not tested Dressing:  Not tested Feeding/Grooming:  Minimal Assistance to moderate assistance Toileting:  Not tested Functional Mobility:  Moderate Assistance x 2  
 
ASSESSMENT: 
Ms. Ender Vera presents supine in the bed. Son at bedside reports she had a rough night last night. Pt remains confused and isn't as interactive today as she was the previous session. Pt has difficulty initiating movement out of bed with little ability to move her legs to the edge of the bed. Pt also had trouble initiating grooming tasks with pt provided with hand over hand assistance as needed. Pt perseverated with ADL and needed cues to terminate the task as well. Pt worked on standing balance while grooming and did fairly well. Pt tolerated standing and functional mobility with min-mod A x 2. Pt is supposed to get a MRI due to confusion.  Pt to continue per plan of care. SUBJECTIVE:  
Ms. Sharonda Osorio states, \"I can try. \" SOCIAL HISTORY/LIVING ENVIRONMENT:  
Home Environment: Private residence # Steps to Enter: 0 (ramp) One/Two Story Residence: One story Living Alone: Yes Support Systems: Child(ericka) OBJECTIVE:  
 
PAIN: VITAL SIGNS: LINES/DRAINS:  
Pre Treatment: Pain Screen Pain Scale 1: Numeric (0 - 10) Pain Intensity 1: 0 Post Treatment: 0   IV 
O2 Device: None (Room air) ACTIVITIES OF DAILY LIVING: I Mod I S SBA CGA Min Mod Max Total NT Comments BASIC ADLs:             
Bathing/ Showering [] [] [] [] [] [] [] [] [] [x] Toileting [] [] [] [] [] [] [] [] [] [x] Dressing [] [] [] [] [] [] [] [] [x] [] Donning socks Feeding [] [] [] [] [] [] [] [] [] [x] Grooming [] [] [] [] [] [x] [x] [] [] [] Brushing teeth, washing face, combing hair Personal Device Care [] [] [] [] [] [] [] [] [] [x] Functional Mobility [] [] [] [] [] [x] [x] [] [] [] X 2  
I=Independent, Mod I=Modified Independent, S=Supervision, SBA=Standby Assistance, CGA=Contact Guard Assistance,  
Min=Minimal Assistance, Mod=Moderate Assistance, Max=Maximal Assistance, Total=Total Assistance, NT=Not Tested MOBILITY: I Mod I S SBA CGA Min Mod Max Total  NT x2 Comments:  
Supine to sit [] [] [] [] [] [] [x] [] [] [] [x] Sit to supine [] [] [] [] [] [] [] [] [] [x] [] Sit to stand [] [] [] [] [] [x] [x] [] [] [] [x] Bed to chair [] [] [] [] [] [x] [x] [] [] [] [x] I=Independent, Mod I=Modified Independent, S=Supervision, SBA=Standby Assistance, CGA=Contact Guard Assistance,  
Min=Minimal Assistance, Mod=Moderate Assistance, Max=Maximal Assistance, Total=Total Assistance, NT=Not Tested BALANCE: Good Fair+ Fair Fair- Poor NT Comments Sitting Static [] [x] [] [] [] [] Sitting Dynamic [] [] [x] [] [] []   
         
Standing Static [] [] [x] [] [] []   
Standing Dynamic [] [] [] [x] [] [] PLAN:  
FREQUENCY/DURATION: OT Plan of Care: 3 times/week for duration of hospital stay or until stated goals are met, whichever comes first. 
 
TREATMENT:  
TREATMENT:  
($$ Self Care/Home Management: 8-22 mins$$ Neuromuscular Re-Education: 8-22 mins   ) Self Care (15 Minutes): Self care including Grooming to increase independence and decrease level of assistance required. Neuromuscular Re-education (13 Minutes): Neuromuscular Re-education included Balance Training, Coordination training, Postural training, Sitting balance training and Standing balance training to improve Balance, Coordination, Functional Mobility and Postural Control. TREATMENT GRID: 
N/A 
 
AFTER TREATMENT POSITION/PRECAUTIONS: 
Alarm Activated, Chair, Needs within reach, RN notified and Visitors at bedside INTERDISCIPLINARY COLLABORATION: 
RN/PCT, PT/PTA and OT/RECINOS TOTAL TREATMENT DURATION: 
OT Patient Time In/Time Out Time In: 1109 Time Out: 1137 Imagene Patient, OT

## 2021-05-26 NOTE — PROGRESS NOTES
Pt has been OOB to chair since mid day with PT, and tolerating well. Also, she has ambulated with assistance to the bathroom twice without any difficulty.

## 2021-05-26 NOTE — PROGRESS NOTES
ACUTE PHYSICAL THERAPY GOALS: 
(Developed with and agreed upon by patient and/or caregiver. ) LTG: (Reviewed & Updated 5/26/21) 
(1.)Ms. Ender Vera will move from supine to sit and sit to supine , scoot up and down and roll side to side in bed with CONTACT GUARD ASSIST within 7 treatment day(s). (2.)Ms. Ender Vera will transfer from bed to chair and chair to bed with CONTACT GUARD ASSIST using the least restrictive device within 7 treatment day(s). (3.)Ms. Ender Vera will ambulate with CONTACT GUARD for 100 feet with the least restrictive device within 7 treatment day(s). (4.)Ms. Ender Vera will perform standing static and dynamic standing balance activities x 25 minutes with CONTACT GUARD ASSIST to improve safety and activity tolerance within 7 treatment day(s). (5.)Ms. Ender Vera will perform therapeutic exercises x 15 minutes for HEP with SUPERVISION to improve strength, endurance, and functional mobility within 7 treatment day(s). PHYSICAL THERAPY: Daily Note and PM Treatment Day # 3 Radha Doshi is a [de-identified] y.o. female PRIMARY DIAGNOSIS: Hypercalcemia Hypercalcemia [E83.52] ASSESSMENT:  
 
REHAB RECOMMENDATIONS: CURRENT LEVEL OF FUNCTION: 
(Most Recently Demonstrated) Recommendation to date pending progress: 
Setting:  Short-term Rehab Equipment:  To Be Determined Bed Mobility:  Moderate Assistance x 2 Sit to Stand:  Minimal Assistance x 2 Transfers:  Minimal Assistance x 2 Gait/Mobility:  Minimal Assistance x 2  
 
ASSESSMENT: 
Ms. Ender Vera presents resting in bed,  son and daughter in law present. Pt apparently did not rest as well last night, but is still agreeable to therapy with encouragement. Overall Min Ax2 with mobility, heavy cues to decrease perseveration and facilitate initiation during tasks and mobility. Good progress today, PT goals have been updated to reflect pt's current level of function and mobility.  Pt requiring less assistance with transfers, and able to get up to chair today and increase standing activity. Pt will continue to benefit from skilled PT during her acute stay. SUBJECTIVE:  
Ms. Ender Vera states, \"I am going to go to the bathroom. \" SOCIAL HISTORY/ LIVING ENVIRONMENT: lives alone, 2 recent falls, mod I w/ RW, adult son lives in town Home Environment: Private residence # Steps to Enter: 0 (ramp) One/Two Story Residence: One story Living Alone: Yes Support Systems: Child(ericka) OBJECTIVE:  
 
PAIN: VITAL SIGNS: LINES/DRAINS:  
Pre Treatment: Pain Screen Pain Scale 1: Numeric (0 - 10) Pain Intensity 1: 0 Post Treatment: not rated Vital Signs O2 Device: None (Room air) IV and Purewick O2 Device: None (Room air) MOBILITY: I Mod I S SBA CGA Min Mod Max Total  NT x2 Comments:  
Bed Mobility Rolling [] [] [] [] [] [] [] [] [] [] [] Supine to Sit [] [] [] [] [] [] [x] [] [] [] [x] Scooting [] [] [] [] [] [] [x] [] [] [] [] Sit to Supine [] [] [] [] [] [] [x] [] [] [] [x] Transfers Sit to Stand [] [] [] [] [] [] [x] [] [] [] [x] Bed to Chair [] [] [] [] [] [] [] [] [] [] []   
Stand to Sit [] [] [] [] [] [] [x] [] [] [] [x] I=Independent, Mod I=Modified Independent, S=Supervision, SBA=Standby Assistance, CGA=Contact Guard Assistance,  
Min=Minimal Assistance, Mod=Moderate Assistance, Max=Maximal Assistance, Total=Total Assistance, NT=Not Tested BALANCE: Good Fair+ Fair Fair- Poor NT Comments Sitting Static [] [x] [] [] [] [] Sitting Dynamic [] [x] [] [] [] []   
         
Standing Static [] [] [x] [] [] []   
Standing Dynamic [] [] [x] [] [] [] GAIT: I Mod I S SBA CGA Min Mod Max Total  NT x2 Comments:  
Level of Assistance [] [] [] [] [] [x] [x] [] [] [] [x] Distance 6', 2'   
DME Rolling Walker Gait Quality Short steps, slightly flexed posture Weightbearing Status N/A I=Independent, Mod I=Modified Independent, S=Supervision, SBA=Standby Assistance, CGA=Contact Guard Assistance,  
Min=Minimal Assistance, Mod=Moderate Assistance, Max=Maximal Assistance, Total=Total Assistance, NT=Not Tested PLAN:  
FREQUENCY/DURATION: PT Plan of Care: 3 times/week for duration of hospital stay or until stated goals are met, whichever comes first. 
TREATMENT:  
 
TREATMENT:  
($$ Therapeutic Activity: 23-37 mins    ) Co-Treatment PT/OT necessary due to patient's decreased overall endurance/tolerance levels, as well as need for high level skilled assistance to complete functional transfers/mobility and functional tasks Therapeutic Activity (26 Minutes): Therapeutic activity included Supine to Sit, Sit to Supine, Scooting, Ambulation on level ground, Sitting balance  and Standing balance to improve functional Mobility, Strength and Activity tolerance. TREATMENT GRID: 
N/A 
 
AFTER TREATMENT POSITION/PRECAUTIONS: 
Alarm Activated, Bed, Needs within reach, RN notified and Visitors at bedside INTERDISCIPLINARY COLLABORATION: 
RN/PCT, PT/PTA and OT/RECINOS TOTAL TREATMENT DURATION: 
PT Patient Time In/Time Out Time In: 1109 Time Out: 1137 April Pin, PT

## 2021-05-26 NOTE — PROGRESS NOTES
Per chart review, [facility] has accepted the patient. [facility] is unable to accept the patient for STR, as SNF stated, \"Patient does not meet the level of care required for admission. \" Patient's family would like to accept bed offer with [facility]. Family confirmed the patient has been vaccinated. SNF has been selected. Patient to discharge to [facility] when medically stable. CM remains available.

## 2021-05-26 NOTE — PROGRESS NOTES
Torrey Avendano Admission Date: 5/21/2021 Renal Daily Progress Note: 5/26/2021 The patient's chart is reviewed and the patient is discussed with the staff. Follow up VIET, hypercalcemia Subjective:  
Patient seen and examined on rounds, son and daughter in law at the bedside pt drowsy but arousbale, poor rest last night, low grade fever ROS: 
General: + low grade fever/chills, appetite better CV: no CP Lung: no SOB, no cough GI: no N/V/D 
: no dysuria uop via purwik Ext: no edema Current Facility-Administered Medications Medication Dose Route Frequency  lactated Ringers infusion  75 mL/hr IntraVENous CONTINUOUS  
 aspirin chewable tablet 81 mg  81 mg Oral DAILY  dilTIAZem ER (CARDIZEM CD) capsule 240 mg  240 mg Oral DAILY  escitalopram oxalate (LEXAPRO) tablet 10 mg  10 mg Oral DAILY  prasugreL (EFFIENT) tablet 10 mg  10 mg Oral DAILY  pravastatin (PRAVACHOL) tablet 40 mg  40 mg Oral Q24H  
 sodium chloride (NS) flush 5-40 mL  5-40 mL IntraVENous Q8H  
 sodium chloride (NS) flush 5-40 mL  5-40 mL IntraVENous PRN  
 acetaminophen (TYLENOL) tablet 650 mg  650 mg Oral Q6H PRN Or  
 acetaminophen (TYLENOL) suppository 650 mg  650 mg Rectal Q6H PRN  
 [Held by provider] polyethylene glycol (MIRALAX) packet 17 g  17 g Oral DAILY  [Held by provider] senna-docusate (PERICOLACE) 8.6-50 mg per tablet 1 Tablet  1 Tablet Oral BID  magnesium hydroxide (MILK OF MAGNESIA) 400 mg/5 mL oral suspension 30 mL  30 mL Oral DAILY PRN  
 bisacodyL (DULCOLAX) suppository 10 mg  10 mg Rectal DAILY PRN  
 ondansetron (ZOFRAN ODT) tablet 4 mg  4 mg Oral Q8H PRN Or  
 ondansetron (ZOFRAN) injection 4 mg  4 mg IntraVENous Q6H PRN  
 famotidine (PEPCID) tablet 20 mg  20 mg Oral DAILY  enoxaparin (LOVENOX) injection 40 mg  40 mg SubCUTAneous DAILY  melatonin tablet 5 mg  5 mg Oral QHS Objective:  
 
Vitals:  
 05/26/21 0019 05/26/21 0262 05/26/21 7350 05/26/21 0563 BP: (!) 169/80 (!) 161/76  (!) 159/76 Pulse: 89 86  83 Resp: 17 17  17 Temp: 99.3 °F (37.4 °C) 98.1 °F (36.7 °C)  99.2 °F (37.3 °C) SpO2: 97% 97%  96% Weight:   81.9 kg (180 lb 8.9 oz) Height:      
 
Intake and Output:  
05/24 1901 - 05/26 0700 In: 1739.2 [I.V.:1739.2] Out: 100 [Urine:100] No intake/output data recorded. Physical Exam:  
Constitutional:  the patient is well developed and in no acute distress, drowsy but arousable and oriented HEENT:  Sclera clear, pupils equal, oral mucosa moist, hard of hearing Lungs: clear lung sounds bilaterally Cardiovascular:  Regular rate, S1, S2,  No Rub Abd/GI: soft and non-tender; with positive bowel sounds. Ext: warm without cyanosis. There is no lower leg edema. Skin:  no jaundice or rashes Neuro: no gross neuro deficits Psychiatric: Calm. LAB Recent Labs  
  05/26/21 
0630 05/24/21 
4487 WBC 4.7 5.4 HGB 9.3* 10.2* HCT 26.5* 28.9*  
* 120* Recent Labs  
  05/26/21 
0630 05/25/21 
0622 05/24/21 
0060  142 140  
K 3.6 3.8 3.2*  
* 108* 107 CO2 28 29 31 * 121* 129* BUN 24* 29* 29* CREA 1.63* 1.71* 1.88* MG 1.4*  --   --   
ALB  --   --  3.1* No results for input(s): PH, PCO2, PO2, HCO3 in the last 72 hours. Assessment:  (Medical Decision Making) Hospital Problems  Date Reviewed: 6/15/2020 Codes Class Noted POA Acute encephalopathy ICD-10-CM: G93.40 ICD-9-CM: 348.30  5/22/2021 Unknown Hypokalemia ICD-10-CM: E87.6 ICD-9-CM: 276.8  5/22/2021 Unknown Acute renal failure with tubular necrosis (HCC) ICD-10-CM: N17.0 ICD-9-CM: 584.5  5/22/2021 Unknown * (Principal) Hypercalcemia ICD-10-CM: X37.03 
ICD-9-CM: 275.42  5/21/2021 Unknown Diabetes mellitus type II, controlled ([facility] 75.) ICD-10-CM: E11.9 ICD-9-CM: 250.00  2/16/2010 Yes Dyslipidemia ICD-10-CM: E78.5 ICD-9-CM: 272.4  2/16/2010 Yes  Essential hypertension, benign ICD-10-CM: I10 
ICD-9-CM: 401.1  2/16/2010 Yes Plan:  (Medical Decision Making) 1. VIET likely pre renal vs ATN in the setting of NSAID mobic, ACEi, volume depletion, granular casts on UA 
- Cr improving down to 1.63, non oliguric, renal US normal size kidneys without evidence of obstructive nephropathy  
-trending renal indices with strict I&O 2. Hypercalcemia- s/p Zometa and calcitonin 
-CT with evidence of bone lesions in the skull, MM work up in progress - K/L ratio 0.99, SPEP pending, flow cytometry and s/p bone marrow aspiration and bx 5/24 3. Hypokalemia/hypomag replete per hosp 4. HTN- continue diltiazem CD 5. Low grade fever- cultures pending per primary Cele Levi NP

## 2021-05-26 NOTE — PROGRESS NOTES
Hospitalist Progress Note 2021 Admit Date: 2021  8:22 PM  
NAME: Mary Amanda :  1940 MRN:  [id number] Attending: Chon Payne MD 
PCP:  Oma Woods MD 
 
SUBJECTIVE:  
 
Mary Amanda is a [de-identified]years old female with h/o CAD, HTN, HLD, admitted on  for acute encephalopathy secondary to hypercalcemia which is strongly suspected to be from underlying multiple myeloma based on radiographic evidence of translucent skull lesions on CT head. Pt also had acute renal failure with creatinine of 1.5. pt has suppressed PTH intact and vitamin D levels. : 
Pt resting in bed, alert/awake but minimally verbal and continues to be confused. Discussed with pt's son about plan of care. No diarrhea, vomiting, fever, abdominal pain. ROS: 
10 point review system was limited as patient is currently not able to answer any questions, information obtained from patient's son. PHYSICAL EXAM  
 
 
Visit Vitals BP (!) 151/72 (BP 1 Location: Right upper arm, BP Patient Position: Sitting) Pulse 74 Temp 98.3 °F (36.8 °C) Resp 17 Ht 5' 4\" (1.626 m) Wt 81.9 kg (180 lb 8.9 oz) SpO2 99% BMI 30.99 kg/m² Temp (24hrs), Av.6 °F (37 °C), Min:98 °F (36.7 °C), Max:99.3 °F (37.4 °C) Oxygen Therapy O2 Sat (%): 99 % (21 1210) Pulse via Oximetry: 60 beats per minute (21 1622) O2 Device: None (Room air) (21 1109) O2 Flow Rate (L/min): 3 l/min (21 1523) ETCO2 (mmHg): 42 mmHg (21 1543) No intake or output data in the 24 hours ending 21 1323 General: Elderly, NAD, lethargic, alert awake, on room air Head:  Atraumatic Normocephalic. Eyes:  PERRLA, EOMI, Anicteric. ENT:  No discharges/lesions. Lungs:  CTA Bilaterally. CVS:  Regular rate and rhythm,  No murmur, rub, or gallop, No JVD, No lower   extremity edema. Abdomen: Soft, Non distended, Non tender, Positive bowel sounds.  
MSK:  No deformities, lesions, Spontaneously moves extremities. Neurologic:  Moving all 4 extremities spontaneously, answering questions Psychiatry:      Flat affect Skin:   No rash/lesions. Good skin turgor Heme/Lymph/Immune:  No petechiae, ecchymoses, overt signs of bleeding or    lymphadenopathy noted. Recent Results (from the past 24 hour(s)) METABOLIC PANEL, BASIC Collection Time: 05/26/21  6:30 AM  
Result Value Ref Range Sodium 142 136 - 145 mmol/L Potassium 3.6 3.5 - 5.1 mmol/L Chloride 110 (H) 98 - 107 mmol/L  
 CO2 28 21 - 32 mmol/L Anion gap 4 (L) 7 - 16 mmol/L Glucose 114 (H) 65 - 100 mg/dL BUN 24 (H) 8 - 23 MG/DL Creatinine 1.63 (H) 0.6 - 1.0 MG/DL  
 GFR est AA 39 (L) >60 ml/min/1.73m2 GFR est non-AA 32 (L) >60 ml/min/1.73m2 Calcium 9.1 8.3 - 10.4 MG/DL  
CBC W/O DIFF Collection Time: 05/26/21  6:30 AM  
Result Value Ref Range WBC 4.7 4.3 - 11.1 K/uL  
 RBC 2.94 (L) 4.05 - 5.2 M/uL HGB 9.3 (L) 11.7 - 15.4 g/dL HCT 26.5 (L) 35.8 - 46.3 % MCV 90.1 79.6 - 97.8 FL  
 MCH 31.6 26.1 - 32.9 PG  
 MCHC 35.1 (H) 31.4 - 35.0 g/dL  
 RDW 13.4 11.9 - 14.6 % PLATELET 396 (L) 243 - 450 K/uL MPV 10.1 9.4 - 12.3 FL ABSOLUTE NRBC 0.00 0.0 - 0.2 K/uL MAGNESIUM Collection Time: 05/26/21  6:30 AM  
Result Value Ref Range Magnesium 1.4 (L) 1.8 - 2.4 mg/dL PROCALCITONIN Collection Time: 05/26/21  6:30 AM  
Result Value Ref Range Procalcitonin <0.05 ng/mL URINALYSIS W/ RFLX MICROSCOPIC Collection Time: 05/26/21 12:32 PM  
Result Value Ref Range Color YELLOW Appearance CLOUDY Specific gravity 1.009 1.001 - 1.023    
 pH (UA) 8.0 5.0 - 9.0 Protein TRACE (A) NEG mg/dL Glucose Negative mg/dL Ketone TRACE (A) NEG mg/dL Bilirubin Negative NEG Blood TRACE (A) NEG Urobilinogen 2.0 (H) 0.2 - 1.0 EU/dL Nitrites Negative NEG Leukocyte Esterase Negative NEG    
URINE MICROSCOPIC Collection Time: 05/26/21 12:32 PM  
Result Value Ref Range  WBC 5-10 0 /hpf  
 RBC 3-5 0 /hpf Epithelial cells 0-3 0 /hpf Bacteria 4+ (H) 0 /hpf Casts HYALINE 0 /lpf Crystals, urine 0 0 /LPF Mucus 0 0 /lpf Imaging Muna Reeves All diagnostic imaging personally reviewed by me. EXAM: Noncontrast CT head. 
  
INDICATION: Pain, fall injury. 
  
COMPARISON: None. 
  
TECHNIQUE: Noncontrast CT images of the head were obtained. Radiation dose 
reduction techniques were used for this study. Our CT scanners use one or all 
of the following:  Automated exposure control, adjustment of the mA or kV 
according to patient size, iterative reconstruction. 
  
FINDINGS: There is mild volume loss and chronic small vessel ischemic changes in 
the white matter. No acute infarct, hemorrhage or mass is identified. There is 
no mass effect, midline shift or depressed fracture. The visualized paranasal 
sinuses and mastoid air cells are clear. There are multiple subcentimeter lucent 
lesions in both sides of the skull. 
  
IMPRESSION 1. No acute intracranial process. 2. Multiple small lucent lesions in the skull, without evidence of extraosseous 
extension. Differential considerations would include metastatic disease and 
multiple myeloma. ASSESSMENT Hospital Problems as of 5/26/2021 Date Reviewed: 6/15/2020 Codes Class Noted - Resolved POA Acute encephalopathy ICD-10-CM: G93.40 ICD-9-CM: 348.30  5/22/2021 - Present Unknown Hypokalemia ICD-10-CM: E87.6 ICD-9-CM: 276.8  5/22/2021 - Present Unknown Acute renal failure with tubular necrosis (HCC) ICD-10-CM: N17.0 ICD-9-CM: 584.5  5/22/2021 - Present Unknown * (Principal) Hypercalcemia ICD-10-CM: U16.40 
ICD-9-CM: 275.42  5/21/2021 - Present Unknown Diabetes mellitus type II, controlled ([facility] [address] ICD-10-CM: E11.9 ICD-9-CM: 250.00  2/16/2010 - Present Yes Dyslipidemia ICD-10-CM: E78.5 ICD-9-CM: 272.4  2/16/2010 - Present Yes  Essential hypertension, benign ICD-10-CM: I10 
ICD-9-CM: 401.1  2/16/2010 - Present Yes Plan: #Acute encephalopathy: Improving Secondary to dehydration and hypercalcemia 5/26: 
Per family members patient's mentation is improving Continue correcting the underlying etiology of which is hypercalcemia and dehydration. No underlying infectious etiology suspected currently. Pt is still not at baseline, continues to be disoriented and confused. Will do infectious work up blood and urine culture, PCT Check MRI brain wo contrast 
 
#Hypercalcemia: Resolved 5/26: 
Calcium down to 9.1 Currently on IV fluids. Status post 1 dose of Zometa Status post 4 doses of calcitonin Patient was on HCTZ as outpatient that could have also contributed to hypercalcemia however given the radiographic evidence of multiple lytic lesion in her skull multiple myeloma is suspected. Status post IR guided bone marrow biopsy on 5/25, pending pathology results. Follow-up with SPEP and serum free light chains. Ordered for skeletal survey and urine protein and PEP #Acute renal failure: Resolving 5/26: Baseline is normal 
Creatinine continues to increase from 1.5-1.88, BUN 29. Status post nephrology evaluation. Low concerns for myeloma kidney however ATN is a possibility. Creatinine down from 1.8-1.7. Plan to continue IV fluids for now. Renal ultrasound unremarkable Creatinine today is 1.63 #Hypokalemia: Resolved 5/26: 
3.6 today Continue supplementation as needed via #Hypomagnesemia: 
5/25: 
Replaced #HTN: 
Continue home medication diltiazem CD. BP is optimally controlled, continue to monitor. #Anxiety depression: 
Continue Lexapro 10 mg p.o. daily #GI prophylaxis with Pepcid #CAD: 
Continue aspirin and Effient with Pravachol. #DVT prophylaxis with Lovenox PT and OT eval 
 
Ordered for speech eval, patient is currently n.p.o. Disposition: Pending until medically stable FULL CODE Shazia Alejandro MD

## 2021-05-26 NOTE — PROGRESS NOTES
Pt pulled out IV from R arm during sleep. Pressure held at site. Coban pressure dressing placed. Pt cleaned up. Will attempt to place IV. Will notify MD if IV can not be placed.

## 2021-05-27 NOTE — PROGRESS NOTES
Radha Doshi Admission Date: 5/21/2021 Renal Daily Progress Note: 5/27/2021 The patient's chart is reviewed and the patient is discussed with the staff. Follow up VIET, hypercalcemia Subjective:  
Patient seen and examined on rounds, son and daughter in law at the bedside pt reports reports poor rest over night, good uop. ROS: 
General: + low grade fever/chills, appetite better CV: no CP Lung: no SOB, no cough GI: no N/V/D 
: no dysuria uop via purwik Ext: no edema Current Facility-Administered Medications Medication Dose Route Frequency  lactated Ringers infusion  75 mL/hr IntraVENous CONTINUOUS  
 aspirin chewable tablet 81 mg  81 mg Oral DAILY  dilTIAZem ER (CARDIZEM CD) capsule 240 mg  240 mg Oral DAILY  escitalopram oxalate (LEXAPRO) tablet 10 mg  10 mg Oral DAILY  prasugreL (EFFIENT) tablet 10 mg  10 mg Oral DAILY  pravastatin (PRAVACHOL) tablet 40 mg  40 mg Oral Q24H  
 sodium chloride (NS) flush 5-40 mL  5-40 mL IntraVENous Q8H  
 sodium chloride (NS) flush 5-40 mL  5-40 mL IntraVENous PRN  
 acetaminophen (TYLENOL) tablet 650 mg  650 mg Oral Q6H PRN Or  
 acetaminophen (TYLENOL) suppository 650 mg  650 mg Rectal Q6H PRN  
 [Held by provider] polyethylene glycol (MIRALAX) packet 17 g  17 g Oral DAILY  [Held by provider] senna-docusate (PERICOLACE) 8.6-50 mg per tablet 1 Tablet  1 Tablet Oral BID  magnesium hydroxide (MILK OF MAGNESIA) 400 mg/5 mL oral suspension 30 mL  30 mL Oral DAILY PRN  
 bisacodyL (DULCOLAX) suppository 10 mg  10 mg Rectal DAILY PRN  
 ondansetron (ZOFRAN ODT) tablet 4 mg  4 mg Oral Q8H PRN Or  
 ondansetron (ZOFRAN) injection 4 mg  4 mg IntraVENous Q6H PRN  
 famotidine (PEPCID) tablet 20 mg  20 mg Oral DAILY  enoxaparin (LOVENOX) injection 40 mg  40 mg SubCUTAneous DAILY  melatonin tablet 5 mg  5 mg Oral QHS Objective:  
 
Vitals:  
 05/27/21 4810 05/27/21 8554 05/27/21 0820 05/27/21 1054 BP: (!) 170/81  (!) 171/82 (!) 174/63 Pulse: 90  85 85 Resp: 18  18 20 Temp: 99.1 °F (37.3 °C)  98.8 °F (37.1 °C) 99.8 °F (37.7 °C) SpO2: 96%  98% 98% Weight:  80.6 kg (177 lb 11.1 oz) Height:      
 
Intake and Output:  
05/25 1901 - 05/27 0700 In: 546 [I.V.:546] Out: 450 [Urine:450] 05/27 0701 - 05/27 1900 In: -  
Out: 700 [Urine:700] Physical Exam:  
Constitutional:  the patient is well developed and in no acute distress, drowsy but arousable and oriented HEENT:  Sclera clear, pupils equal, oral mucosa moist, hard of hearing Lungs: clear lung sounds bilaterally Cardiovascular:  Regular rate, S1, S2,  No Rub Abd/GI: soft and non-tender; with positive bowel sounds. Ext: warm without cyanosis. There is no lower leg edema. Skin:  no jaundice or rashes Neuro: no gross neuro deficits Psychiatric: Calm. LAB Recent Labs  
  05/26/21 
0630 WBC 4.7 HGB 9.3* HCT 26.5*  
* Recent Labs  
  05/27/21 
0458 05/26/21 
0630 05/25/21 
3424  142 142  
K 3.2* 3.6 3.8  110* 108* CO2 25 28 29 * 114* 121* BUN 20 24* 29* CREA 1.53* 1.63* 1.71* MG  --  1.4*  -- No results for input(s): PH, PCO2, PO2, HCO3 in the last 72 hours. Assessment:  (Medical Decision Making) Hospital Problems  Date Reviewed: 6/15/2020 Codes Class Noted POA Acute encephalopathy ICD-10-CM: G93.40 ICD-9-CM: 348.30  5/22/2021 Unknown Hypokalemia ICD-10-CM: E87.6 ICD-9-CM: 276.8  5/22/2021 Unknown Acute renal failure with tubular necrosis (HCC) ICD-10-CM: N17.0 ICD-9-CM: 584.5  5/22/2021 Unknown * (Principal) Hypercalcemia ICD-10-CM: F34.26 
ICD-9-CM: 275.42  5/21/2021 Unknown Diabetes mellitus type II, controlled ([facility] ICD-10-CM: E11.9 ICD-9-CM: 250.00  2/16/2010 Yes Dyslipidemia ICD-10-CM: E78.5 ICD-9-CM: 272.4  2/16/2010 Yes  Essential hypertension, benign ICD-10-CM: I10 
ICD-9-CM: 401.1 2/16/2010 Yes Plan:  (Medical Decision Making) 1. VIET likely pre renal vs ATN in the setting of NSAID mobic, ACEi, volume depletion, granular casts on UA 
- Cr improving down to 1.53, non oliguric, renal US normal size kidneys without evidence of obstructive nephropathy  
-trending renal indices with strict I&O 2. Hypercalcemia- s/p Zometa and calcitonin 
-CT with evidence of bone lesions in the skull, MM work up in progress - K/L ratio 0.99, SPEP M spike 0.28, flow cytometry and s/p bone marrow aspiration results pending oncology to FU 3. Hypokalemia/hypomag replete per hosp 4. HTN- continue diltiazem CD 5. Low grade fever- BC NGTD Will sign off with improving renal function, ordered outpt Nephrology FU, thanks for this consultation.   
 
 
Keturah Barber NP

## 2021-05-27 NOTE — PROGRESS NOTES
Pt resting in bed;appears to be sleeping at this time. Did not rest well this shift. Pt son at bedside. Bed in low position and call light/ personal items within reach. Will continue to monitor and give bedside shift report to oncoming day shift nurse.

## 2021-05-27 NOTE — PROGRESS NOTES
Chart reviewed with Dr Oneyda Roper. BMBx pending. However, SPEP with small M-spike and negative 24 hr UPEP. Will obtain CT CAP and check CA 27.29 and CA 15-3. Will see patient with Dr Oneyda Roper tomorrow. Nohelia Morales, SACHIN [address] [facility] H[facility]ology [facility] On[facility] [address] [place] [address] Office : [phone] Fax : [phone]

## 2021-05-28 PROBLEM — E44.1 MILD PROTEIN-CALORIE MALNUTRITION (HCC): Status: ACTIVE | Noted: 2021-01-01

## 2021-05-28 NOTE — PROGRESS NOTES
05/28/21 1804   Dual Skin Pressure Injury Assessment   Dual Skin Pressure Injury Assessment WDL   Second Care Provider (Based on Facility Policy) Akbar Jiang RN   Skin Integumentary   Skin Integumentary (WDL) X    Pressure  Injury Documentation No Pressure Injury Noted-Pressure Ulcer Prevention Initiated   Skin Color Appropriate for ethnicity   Skin Condition/Temp Warm;Dry   Skin Integrity Incision (comment)  (bmbx)   Turgor Non-tenting   Hair Growth Present   incision and bruising on sacrum from bone marrow biopsy.  No breakdown noted

## 2021-05-28 NOTE — PROGRESS NOTES
TRANSFER - IN REPORT:    Verbal report received from Steven(name) on [facility]  being received from 6th Floor(unit) for routine progression of care      Report consisted of patients Situation, Background, Assessment and   Recommendations(SBAR). Information from the following report(s) SBAR and Recent Results was reviewed with the receiving nurse. Opportunity for questions and clarification was provided. Assessment completed upon patients arrival to unit and care assumed.

## 2021-05-28 NOTE — CONSULTS
Comprehensive Nutrition Assessment Type and Reason for Visit: Initial, Consult Poor intake/appetite 5 or more days (hospitalist) Nutrition Recommendations/Plan:  
 Continue current diet. Encouraged intake of outside foods as needed.  Preferences added for \"finger foods\".  Ensure enlive TID with meals ordered (provides 350 kcal and 20 gm PRO). Malnutrition Assessment: 
Malnutrition Status: Mild malnutrition Context: Acute illness Findings of clinical characteristics of malnutrition:  
Energy Intake:  1 - 75% or less of est energy req for 7 or more days Nutrition Assessment:  
Nutrition History: Pt seen in company of two family members at bedside. Pt pleasantly confused and unable to give nutrition history. Family members report that pt was eating well PTA - feeding herself and cooking meals with family. They report that she has not had any weight changes or change in appetite PTA. Nutrition Background: Pt admitted S/P fall with hypercalcemia and VIET. PMH notable for hypertension, diabetes, heart disease and chronic back pain. Daily Update: 
Pt seen in company of family at bedside - pleasantly confused. Family reports pt ate a fair amount of breakfast this morning. They state that she requires assistance with eating items that require use of utensils but that she eats finger foods well. Per family, pt would only eat chicken nuggets yesterday. Nutrition Related Findings:  
No muscle wasting/fat loss appreciated. Regular/thin liquids per SLP. S/P bone marrow biopsy 5/24. Current Nutrition Therapies: DIET REGULAR 
DIET NUTRITIONAL SUPPLEMENTS All Meals; Ensure Enlive ( ) Current Intake: Average Meal Intake: 0% Average Supplement Intake: None ordered Anthropometric Measures: 
Height: 5' 4\" (162.6 cm) Current Body Wt: 80.5 kg (177 lb 7.5 oz) (5/28), Weight source: Bed scale BMI: 30.4, Obese class 1 (BMI 30.0-34. 9) Admission Body Weight: 179 lb 14.3 oz (5/21, wt source not specified) Ideal Body Weight (lbs) (Calculated): 120 lbs (55 kg), 147.9 % Edema: LLE: 1+;Pitting (5/27/2021  7:42 PM) RLE: 1+;Pitting (5/27/2021  7:42 PM) WT / BMI 5/28/2021 4/12/2021 6/15/2020 WEIGHT 177 lb 7.5 oz 180 lb 194 lb Pt has had a potential 3 pound, 1.6% weight loss within 1 month and a potential 17 lb, 8.7% weight loss within 1 year. Estimated Daily Nutrient Needs: 
Energy (kcal/day): 6709-8401 (20-25) (Kcal/kg, Weight Used: Ideal (54.5 kg)) Protein (g/day): 55-65 (1-1.2) Weight Used: (Ideal (54.5 kg)) Fluid (ml/day):   (Standard renal) Nutrition Diagnosis: · Mild malnutrition, In context of acute illness or injury related to cognitive or neurological impairment as evidenced by intake 0-25% (intermittent confusion/lethargy) Nutrition Interventions:  
Food and/or Nutrient Delivery: Continue current diet, Start oral nutrition supplement (Preferences noted.) Coordination of Nutrition Care: Continue to monitor while inpatient Plan of Care discussed with Helena Henson Goals: Active Goal: Meet >75% of estimated needs at time of nutrition follow-up. Nutrition Monitoring and Evaluation:  
  
Food/Nutrient Intake Outcomes: Food and nutrient intake, Supplement intake Physical Signs/Symptoms Outcomes: Biochemical data, Chewing or swallowing, Meal time behavior Discharge Planning: Too soon to determine [place] [address], [address] [place] [address], [facility] [address], [address] [place] [address] Ave.

## 2021-05-28 NOTE — DISCHARGE SUMMARY
[facility] Hospitalist Discharge Summary Name:  Dawson Botello    Age:80 y.o. Sex:female :  1940 MRN:  [id number] Admitting Physician: Ismael Carlson DO Admit Date: 2021  8:22 PM  
Attending Physician: Ariana Moura DO 
Primary Care Physician: Reynaldo Woods MD  
 
 
Discharge Physician: Hemant Garcia DO  Discharge date: 21 Discharged Condition: Stable Indication for Admission: Chief Complaint Patient presents with  Fall Reasons for hospitalization: 
Hospital Problems as of 2021 Date Reviewed: 6/15/2020 Codes Class Noted - Resolved POA Acute encephalopathy ICD-10-CM: G93.40 ICD-9-CM: 348.30  2021 - Present Unknown Hypokalemia ICD-10-CM: E87.6 ICD-9-CM: 276.8  2021 - Present Unknown Acute renal failure with tubular necrosis (HCC) ICD-10-CM: N17.0 ICD-9-CM: 584.5  2021 - Present Unknown * (Principal) Hypercalcemia ICD-10-CM: C77.50 
ICD-9-CM: 275.42  2021 - Present Unknown Diabetes mellitus type II, controlled ([facility] [address] ICD-10-CM: E11.9 ICD-9-CM: 250.00  2010 - Present Yes Dyslipidemia ICD-10-CM: E78.5 ICD-9-CM: 272.4  2010 - Present Yes Essential hypertension, benign ICD-10-CM: I10 
ICD-9-CM: 401.1  2010 - Present Yes Hospital Course/Interval history: 
Roby Tripathi a [de-identified]years old female with h/o CAD, HTN, HLD, admitted on  for acute encephalopathy and hypercalcemia. Multiple myeloma was suspected based on radiographic evidence of translucent skull lesions on CT head. Patient has since had workup up notable for M spike and pos bone marrow bx per oncology report for MM. Request has been made for her to transfer to [address] [place] [address] for further treatment. Of note, metabolic encephalopathy has not resolved despite treatment of hypercalcemia. Workup up included MRI (w/o stroke) and ammonia that reported as elevated at 43.  In reading there is a rare link between hyerammonemia and MM that responds to treatment. Pt w/o history of liver pathology. Will trial lactulose. Consider neurology eval at  if no improvement. Cont zyprexa qhs. IN addition UA on 5/27 with possible UTI. Started on Rocephin on 5/27, culture pending. Consults:  
IP CONSULT TO ONCOLOGY 
IP CONSULT TO INTERVENTIONAL RADIOLOGY 
IP CONSULT TO NEPHROLOGY Disposition:  
Diet: DIET REGULAR 
DIET NUTRITIONAL SUPPLEMENTS 
DIET REGULAR 
DIET NUTRITIONAL SUPPLEMENTS Code Status: Full Code Current Discharge Medication List  
  
 
 
Medications Discontinued During This Encounter Medication Reason  calciTONIN (MIACALCIN) injection 326 Int'l Units  lidocaine (XYLOCAINE) 20 mg/mL (2 %) injection  mg   
 fentaNYL citrate (PF) injection  mcg  midazolam (VERSED) injection 0.5-2 mg   
 potassium chloride (K-DUR, KLOR-CON) SR tablet 20 mEq Follow Up Orders: Follow-up Appointments Procedures  FOLLOW UP VISIT Appointment in: Two Weeks Please set up hosp FU with [facility] upon hosp d/c with renal panel and cbc w/o diff prior to appointment. [phone] Thanks Please set up hosp FU with [facility] upon hosp d/c with renal panel and cbc w/o diff prior to appointment. [phone] Thanks Standing Status:   Standing Number of Occurrences:   1 Order Specific Question:   Appointment in Answer: Two Weeks Follow-up Information Follow up With Specialties Details Why Contact Info Darius [address] [place] [address] [id number] [phone] Discharge Exam: 
 
Patient Vitals for the past 24 hrs: 
 Temp Pulse Resp BP SpO2  
05/28/21 1051 98.9 °F (37.2 °C) 79 20 (!) 154/78 98 % 05/28/21 1047  76  (!) 174/72   
05/28/21 0729 99 °F (37.2 °C) 76 20 (!) 174/72 99 % 05/28/21 0158 98.9 °F (37.2 °C) 83 20 (!) 164/80 98 % 05/27/21 2349 99 °F (37.2 °C) 82 20 (!) 163/107 98 % 05/27/21 2113 97.8 °F (36.6 °C) 82 20 (!) 177/85 98 % 05/27/21 1556 99.3 °F (37.4 °C) 86 20 (!) 173/79 98 % Oxygen Therapy O2 Sat (%): 98 % (05/28/21 1051) Pulse via Oximetry: 60 beats per minute (05/24/21 1622) O2 Device: None (Room air) (05/28/21 0932) O2 Flow Rate (L/min): 3 l/min (05/24/21 1523) ETCO2 (mmHg): 42 mmHg (05/24/21 1543) Estimated body mass index is 30.46 kg/m² as calculated from the following: 
  Height as of this encounter: 5' 4\" (1.626 m). Weight as of this encounter: 80.5 kg (177 lb 7.5 oz). Intake/Output Summary (Last 24 hours) at 5/28/2021 1316 Last data filed at 5/28/2021 1015 Gross per 24 hour Intake 1424 ml Output 2950 ml Net -1526 ml  
   
*Note that automatically entered I/Os may not be accurate; dependent on patient compliance with collection and accurate  by assistants. Physical Exam:  
General:  No acute distress, speaking in full sentences, no use of accessory muscles HEENT:  Pupils equal and reactive to light and accommodation, oropharynx is clear Neck:   Supple, no lymphadenopathy, no JVD Lungs:  Clear to auscultation bilaterally CV:  Regular rate and rhythm with normal S1 and S2 Abdomen: Soft, nontender, nondistended, normoactive bowel sounds Extremities:  No cyanosis clubbing or edema Neuro:  Nonfocal, A&O to person, place and month/year. Intermittent non sensical speech Psych:  Normal affect All Labs from Last 24 Hrs: 
Recent Results (from the past 24 hour(s)) CA 27.29 Collection Time: 05/27/21  3:17 PM  
Result Value Ref Range CA 27.29 21.9 0.0 - 38.6 U/mL CANCER ANTIGEN (CA) 15-3 Collection Time: 05/27/21  3:17 PM  
Result Value Ref Range Cancer antigen 15-3 14.70 1.0 - 35.0 U/mL AMMONIA Collection Time: 05/27/21 10:24 PM  
Result Value Ref Range Ammonia 43 (H) 11 - 32 UMOL/L  
CBC W/O DIFF  Collection Time: 05/28/21 6:19 AM  
Result Value Ref Range WBC 5.7 4.3 - 11.1 K/uL  
 RBC 3.10 (L) 4.05 - 5.2 M/uL HGB 9.8 (L) 11.7 - 15.4 g/dL HCT 28.1 (L) 35.8 - 46.3 % MCV 90.6 79.6 - 97.8 FL  
 MCH 31.6 26.1 - 32.9 PG  
 MCHC 34.9 31.4 - 35.0 g/dL  
 RDW 13.6 11.9 - 14.6 % PLATELET 057 (L) 417 - 450 K/uL MPV 9.9 9.4 - 12.3 FL ABSOLUTE NRBC 0.00 0.0 - 0.2 K/uL METABOLIC PANEL, BASIC Collection Time: 05/28/21  6:20 AM  
Result Value Ref Range Sodium 143 136 - 145 mmol/L Potassium 2.9 (L) 3.5 - 5.1 mmol/L Chloride 109 (H) 98 - 107 mmol/L  
 CO2 24 21 - 32 mmol/L Anion gap 10 7 - 16 mmol/L Glucose 104 (H) 65 - 100 mg/dL BUN 15 8 - 23 MG/DL Creatinine 1.54 (H) 0.6 - 1.0 MG/DL  
 GFR est AA 42 (L) >60 ml/min/1.73m2 GFR est non-AA 34 (L) >60 ml/min/1.73m2 Calcium 8.4 8.3 - 10.4 MG/DL MAGNESIUM Collection Time: 05/28/21  6:20 AM  
Result Value Ref Range Magnesium 1.3 (L) 1.8 - 2.4 mg/dL All Micro Results Procedure Component Value Units Date/Time CULTURE, URINE [[id number]] Collected: 05/26/21 1232 Order Status: Completed Specimen: Urine from Clean catch Updated: 05/28/21 1133 Special Requests: NO SPECIAL REQUESTS Culture result:    
  NO GROWTH AFTER SHORT PERIOD OF INCUBATION. FURTHER RESULTS TO FOLLOW AFTER OVERNIGHT INCUBATION. CULTURE, BLOOD [[id number]] Collected: 05/26/21 1207 Order Status: Completed Specimen: Blood Updated: 05/28/21 3174 Special Requests: --     
  LEFT 
HAND Culture result: NO GROWTH 2 DAYS     
 CULTURE, BLOOD [[id number]] Collected: 05/26/21 1111 Order Status: Completed Specimen: Blood Updated: 05/28/21 4585 Special Requests: --     
  RIGHT 
HAND Culture result: NO GROWTH 2 DAYS     
  
 
 
SARS-CoV-2 Lab Results \"Novel Coronavirus\" Test: No results found for: COV2NT \"Emergent Disease\" Test: No results found for: EDPR \"SARS-COV-2\" Test: No results found for: XGCOVT Rapid Test:  
No results found for: COVR Diagnostic Imaging/Tests: XR CHEST PA LAT Result Date: 5/21/2021 No acute process. XR SPINE LUMB 2 OR 3 V Result Date: 5/21/2021 No acute process. XR PELV AP ONLY Result Date: 5/21/2021 No acute process. XR BONE SURVEY COMP Result Date: 5/22/2021 Lytic lesions of the skull in the lytic lesion in the left femoral diaphysis. Recommend workup for multiple myeloma. Consider a PET/CT scan. MRI BRAIN WO CONT Result Date: 5/26/2021 1. No acute intracranial abnormality 2. Redemonstrated diffuse heterogeneity and numerous lesions of the calvarial and cervical marrow which can reflect diffuse metastatic disease, myeloma, or lymphoma. CT HEAD WO CONT Result Date: 5/21/2021 1. No acute intracranial process. 2. Multiple small lucent lesions in the skull, without evidence of extraosseous extension. Differential considerations would include metastatic disease and multiple myeloma. CT CHEST ABD PELV W CONT Result Date: 5/27/2021 1. No clear primary malignancy seen. Assessment for a subtle process is somewhat limited by patient breathing motion particularly with imaging through the upper and mid abdomen. 2. Multiple additional bony lesions suggested most evident in the right ilium where a lytic lesion with associated soft tissue component is seen measuring 4.3 cm x 3.7 cm in size. This likely represents additional bony changes from the patient's known lytic disease. No clear metastatic lesions are otherwise suggested. Small right lung nodules are seen. Some of these demonstrate features which favor benign nodules. In general, these are felt to be nonspecific and not clearly indicative of pulmonary metastases although attention on follow-up studies is recommended. 3. Inflamed appearance of the urinary bladder. Recommend correlation with urinalysis, and clinical exam. This report was made using voice transcription.  Despite my best efforts to avoid any, transcription errors may persist. If there is any question about the accuracy of the report or need for clarification, then please call [phone], or text me through perfectserv for clarification or correction. [address] Result Date: 5/25/2021 Normal renal ultrasound. XR KNEE LT 3 V Result Date: 5/21/2021 No acute process. XR KNEE RT 3 V Result Date: 5/21/2021 No acute process. CT BX BONE MARROW AND ASPIRATION Result Date: 5/25/2021 Uncomplicated CT guided left bone marrow aspiration and  biopsy. Plan:  Bedrest observation for 1 hour. Echocardiogram/EKG results: No results found for this visit on 05/21/21. EKG Results Procedure 720 Value Units Date/Time EKG 12 LEAD INITIAL [[id number]] Collected: 05/21/21 2035 Order Status: Completed Updated: 05/21/21 2056 Ventricular Rate 73 BPM   
  Atrial Rate 202 BPM   
  QRS Duration 92 ms Q-T Interval 366 ms   
  QTC Calculation (Bezet) 403 ms Calculated R Axis 75 degrees Calculated T Axis 38 degrees Diagnosis --  
  !! AGE AND GENDER SPECIFIC ECG ANALYSIS !! Normal sinus rhythm Nonspecific ST and T wave abnormality Abnormal ECG When compared with ECG of 17-FEB-2010 06:42, 
Junctional rhythm has replaced Sinus rhythm Confirmed by Libby Nesbitt MD ()Neela ([id number]) on 5/21/2021 8:56:19 PM 
  
  
 
 
Results for orders placed or performed during the hospital encounter of 05/21/21 EKG, 12 LEAD, INITIAL Result Value Ref Range Ventricular Rate 73 BPM  
 Atrial Rate 202 BPM  
 QRS Duration 92 ms Q-T Interval 366 ms  
 QTC Calculation (Bezet) 403 ms Calculated R Axis 75 degrees Calculated T Axis 38 degrees Diagnosis    
  !! AGE AND GENDER SPECIFIC ECG ANALYSIS !! Normal sinus rhythm Nonspecific ST and T wave abnormality Abnormal ECG When compared with ECG of 17-FEB-2010 06:42, 
Junctional rhythm has replaced Sinus rhythm Confirmed by Libby Nesbitt MD ()Neela ([id number]) on 5/21/2021 8:56:19 PM 
  
 
 
Time spent in patient discharge planning and coordination 37 minutes. Signed By: DO Guzman Moraes [facility] [place], [facility]  May 28, 2021  1:16 PM

## 2021-05-28 NOTE — PROGRESS NOTES
ACUTE OT GOALS: 
(Developed with and agreed upon by patient and/or caregiver.) 1. Patient will complete upper body bathing and dressing with supervision and adaptive equipment as needed. 2. Patient will complete toileting with moderate assistance. 3. Patient will tolerate 30 minutes of OT treatment with 2-3 rest breaks to increase activity tolerance for ADLs. 4. Patient will complete functional transfers with minimal assistance and adaptive equipment as needed. 5. Patient will complete functional mobility for short household distances with minimal assistance and good safety awareness.  
  
Timeframe: 7 visits OCCUPATIONAL THERAPY: Daily Note OT Treatment Day # 3 Balwinder Warren is a [de-identified] y.o. female PRIMARY DIAGNOSIS: Hypercalcemia Hypercalcemia [E83.52] [place] / [place] / Product Type: Medicare /  
ASSESSMENT:  
 
REHAB RECOMMENDATIONS: CURRENT LEVEL OF FUNCTION: 
(Most Recently Demonstrated) Recommendation to date pending progress: 
Setting:  Short-term Rehab Equipment:  To Be Determined Bathing:  Total Assistance Dressing:  Total Assistance Feeding/Grooming: 
 Unable to perform Toileting:  Total Assistance Functional Mobility:  Moderate Assistance x 2  
 
ASSESSMENT: 
Ms. Jazzmine Preston is more confused today than previous session but remains fairly pleasant. Pt's son reports she had a rough night. Pt restless and fidgeting throughout session. Pt had loose bowel movement and required total assistance for hygiene and bathing. Attempted to have patient participate but not following commands well today due to being very distracted. Pt stood multiple times throughout session with min-mod A x 2 and did finally get up to the chair. Pt refusing to wash her face stating \"I've already washed my face\" and would not hold the washcloth to assist with upper body bathing. Pt with alarm in place and multiple family members an visitors at bedside.  Pt to continue per plan of care. SUBJECTIVE:  
Ms. Christina Valdez states, \"I can try. \" SOCIAL HISTORY/LIVING ENVIRONMENT:  
Home Environment: Private residence # Steps to Enter: 0 (ramp) One/Two Story Residence: One story Living Alone: Yes Support Systems: Child(ericka) OBJECTIVE:  
 
PAIN: VITAL SIGNS: LINES/DRAINS:  
Pre Treatment: Pain Screen Pain Scale 1: Numeric (0 - 10) Pain Intensity 1: 0 Post Treatment: 0   IV 
O2 Device: None (Room air) ACTIVITIES OF DAILY LIVING: I Mod I S SBA CGA Min Mod Max Total NT Comments BASIC ADLs:             
Bathing/ Showering [] [] [] [] [] [] [] [] [x] [] Limited ability to participate due to confusion Toileting [] [] [] [] [] [] [] [] [x] [] Hygiene and donning a brief Dressing [] [] [] [] [] [] [] [] [x] [] Donning socks Feeding [] [] [] [] [] [] [] [] [] [x] Grooming [] [] [] [] [] [] [] [] [x] [] Washing face Personal Device Care [] [] [] [] [] [] [] [] [] [x] Functional Mobility [] [] [] [] [] [x] [x] [] [] [] X 2  
I=Independent, Mod I=Modified Independent, S=Supervision, SBA=Standby Assistance, CGA=Contact Guard Assistance,  
Min=Minimal Assistance, Mod=Moderate Assistance, Max=Maximal Assistance, Total=Total Assistance, NT=Not Tested MOBILITY: I Mod I S SBA CGA Min Mod Max Total  NT x2 Comments:  
Supine to sit [] [] [] [] [] [] [x] [] [] [] [x] Sit to supine [] [] [] [] [] [] [x] [] [] [] [x] Sit to stand [] [] [] [] [] [x] [x] [] [] [] [x] Bed to chair [] [] [] [] [] [x] [x] [] [] [] [x] I=Independent, Mod I=Modified Independent, S=Supervision, SBA=Standby Assistance, CGA=Contact Guard Assistance,  
Min=Minimal Assistance, Mod=Moderate Assistance, Max=Maximal Assistance, Total=Total Assistance, NT=Not Tested BALANCE: Good Fair+ Fair Fair- Poor NT Comments Sitting Static [] [] [x] [] [] [] Sitting Dynamic [] [] [] [x] [] []   
         
Standing Static [] [] [x] [] [] []   
Standing Dynamic [] [] [] [x] [] [] PLAN: FREQUENCY/DURATION: OT Plan of Care: 3 times/week for duration of hospital stay or until stated goals are met, whichever comes first. 
 
TREATMENT:  
TREATMENT:  
($$ Self Care/Home Management: 23-37 mins$$ Neuromuscular Re-Education: 8-22 mins   ) Self Care (30 Minutes): Self care including Upper Body Bathing, Lower Body Bathing, Toileting, Upper Body Dressing, Lower Body Dressing and Grooming to increase independence and decrease level of assistance required. Neuromuscular Re-education (10 Minutes): Neuromuscular Re-education included Balance Training, Coordination training, Postural training, Sitting balance training and Standing balance training to improve Balance, Coordination, Functional Mobility and Postural Control. TREATMENT GRID: 
N/A 
 
AFTER TREATMENT POSITION/PRECAUTIONS: 
Alarm Activated, Chair, Needs within reach, RN notified and Visitors at bedside INTERDISCIPLINARY COLLABORATION: 
RN/PCT, PT/PTA and OT/ERCINOS TOTAL TREATMENT DURATION: 
OT Patient Time In/Time Out Time In: 8924 Time Out: 1012 Karsten Linda OT

## 2021-05-28 NOTE — PROGRESS NOTES
TRANSFER - OUT REPORT: 
 
Verbal report given to Rand(name) on [place] [facility] [place]  being transferred to [place] (3rd floor)(unit) for routine progression of care Report consisted of patients Situation, Background, Assessment and  
Recommendations(SBAR). Information from the following report(s) SBAR, Kardex, ED Summary, OR Summary, Procedure Summary, Intake/Output, MAR and Recent Results was reviewed with the receiving nurse. Lines:  
Peripheral IV 05/26/21 Anterior; Left Forearm (Active) Site Assessment Clean, dry, & intact 05/28/21 0750 Phlebitis Assessment 0 05/28/21 0750 Infiltration Assessment 0 05/28/21 0750 Dressing Status Clean, dry, & intact 05/28/21 0750 Dressing Type Tape;Transparent 05/28/21 0750 Hub Color/Line Status Infusing 05/28/21 0750 Alcohol Cap Used No 05/28/21 0750 Opportunity for questions and clarification was provided. Patient now going to room 355 on 3rd floor.

## 2021-05-28 NOTE — PROGRESS NOTES
Problem: Falls - Risk of 
Goal: *Absence of Falls Description: Document Johnie Ruffin Fall Risk and appropriate interventions in the flowsheet. Outcome: Progressing Towards Goal 
Note: Fall Risk Interventions: 
Mobility Interventions: Bed/chair exit alarm Mentation Interventions: Adequate sleep, hydration, pain control Medication Interventions: Evaluate medications/consider consulting pharmacy Elimination Interventions: Call light in reach History of Falls Interventions: Bed/chair exit alarm Problem: Patient Education: Go to Patient Education Activity Goal: Patient/Family Education Outcome: Progressing Towards Goal 
  
Problem: Nausea/Vomiting (Adult) Goal: *Absence of nausea/vomiting Outcome: Progressing Towards Goal 
  
Problem: Altered Thought Process (Adult/Pediatric) Goal: Interventions Outcome: Progressing Towards Goal 
  
Problem: Patient Education: Go to Patient Education Activity Goal: Patient/Family Education Outcome: Progressing Towards Goal 
  
Problem: Delirium Prevention Goal: Interventions Outcome: Progressing Towards Goal 
  
Problem: Delirium Treatment Goal: *Level of consciousness restored to baseline Outcome: Progressing Towards Goal 
Goal: *Level of environmental perceptions restored to baseline Outcome: Progressing Towards Goal 
Goal: *Sensory perception restored to baseline Outcome: Progressing Towards Goal 
Goal: *Emotional stability restored to baseline Outcome: Progressing Towards Goal 
Goal: *Functional assessment restored to baseline Outcome: Progressing Towards Goal 
Goal: *Absence of falls Outcome: Progressing Towards Goal 
Goal: *Will remain free of delirium, CAM Score negative Outcome: Progressing Towards Goal 
Goal: *Cognitive status will be restored to baseline Outcome: Progressing Towards Goal 
Goal: Interventions Outcome: Progressing Towards Goal 
  
Problem: Pressure Injury - Risk of 
Goal: *Prevention of pressure injury Description: Document Isaac Scale and appropriate interventions in the flowsheet. Outcome: Progressing Towards Goal 
Note: Pressure Injury Interventions: 
Sensory Interventions: Assess changes in LOC Moisture Interventions: Absorbent underpads Activity Interventions: Assess need for specialty bed Mobility Interventions: Pressure redistribution bed/mattress (bed type) Nutrition Interventions: Document food/fluid/supplement intake Friction and Shear Interventions: Apply protective barrier, creams and emollients

## 2021-05-28 NOTE — H&P
[employer] [facility]        Inpatient Hematology / Oncology History and Physical    Reason for Admission:  Multiple Myeloma    History of Present Illness:  Ms. Gina Villalba is a [de-identified] y.o. female admitted on 5/28/2021 after being transferred from [facility] with a primary diagnosis of multiple myeloma and admission for chemotherapy. Hematology/oncology asked to see MS. Gina Villalba for evaluation of hypercalcemia. She has a history of hypertensions with limited data in the [facility]SS[facility] system. A CMP from [facility] one year ago showed no hypercalcemia. She was seen in our ED after a fall in April and had a normal calcium at that time with a creatinine of 1.02. Subsequent to this fall, she has had some back pain, but also become progressively more listless and anorexic especially over the past four days. This became associated with some mild confusions prompting medical evaluation. She was found to be hypercalcemic and with worsening renal function and was admitted. She was taking HCTZ as an outpatient. CT scanning of her head to evaluate her encephalopathy was notable for normal brain but multiple lytic lesions in the calvarium and skeletal survey revealed lytic lesion in left femoral diaphysis. She underwent SPEP with small M-spike of 0.28, IgA kappa band. FLC normal with normal ratio and UPEP unremarkable. She underwent BMBx that showed marrow cellularity of >90% diffusely infiltrated with plasma cells. She has remained confused despite Ca of 8.4 today. MRI negative for intracranial process. Discussed with family at bedside today that it was felt that she needs treatment for multiple myeloma that has contributed to hypercalcemia and they agree to transfer. Family describes good performance status 3 weeks ago with very recent decline. Review of Systems:    Unable to fully obtain (confused).     Allergies   Allergen Reactions    Pcn [Penicillins] Rash     Past Medical History:   Diagnosis Date    Arthritis     Coronary atherosclerosis of native coronary artery     Diabetes mellitus type II, controlled ([facility] [address] 2/16/2010    Hypertension     Stable angina (HCC)     Stable angina (HCC)      Past Surgical History:   Procedure Laterality Date    HX APPENDECTOMY      HX CHOLECYSTECTOMY      HX HEART CATHETERIZATION  2010    Pt. had a heart attack during this procedure     HX HYSTERECTOMY  1974    NM LAP,CHOLECYSTECTOMY  2000     Family History   Problem Relation Age of Onset    Cancer Mother     Cancer Father     Breast Cancer Neg Hx      Social History     Socioeconomic History    Marital status:      Spouse name: Not on file    Number of children: Not on file    Years of education: Not on file    Highest education level: Not on file   Occupational History    Not on file   Tobacco Use    Smoking status: Never Smoker    Smokeless tobacco: Never Used   Substance and Sexual Activity    Alcohol use: No    Drug use: Not on file    Sexual activity: Not on file   Other Topics Concern    Not on file   Social History Narrative    Not on file     Social Determinants of Health     Financial Resource Strain:     Difficulty of Paying Living Expenses:    Food Insecurity:     Worried About Running Out of Food in the Last Year:     Norma of Food in the Last Year:    Transportation Needs:     Lack of Transportation (Medical):      Lack of Transportation (Non-Medical):    Physical Activity:     Days of Exercise per Week:     Minutes of Exercise per Session:    Stress:     Feeling of Stress :    Social Connections:     Frequency of Communication with Friends and Family:     Frequency of Social Gatherings with Friends and Family:     Attends Je[employer] witness Services:     Active Member of Clubs or Organizations:     Attends Club or Organization Meetings:     Marital Status:    Intimate Partner Violence:     Fear of Current or Ex-Partner:     Emotionally Abused:     Physically Abused:     Sexually Abused: Facility-Administered Medications Ordered in Other Encounters   Medication Dose Route Frequency Provider Last Rate Last Admin    potassium chloride (K-DUR, KLOR-CON) SR tablet 40 mEq  40 mEq Oral BID Mariya Gregorio C, DO   40 mEq at 05/28/21 1046    magnesium oxide (MAG-OX) tablet 400 mg  400 mg Oral DAILY Venus Gregorio C, DO   400 mg at 05/28/21 1046    OLANZapine (ZyPREXA) tablet 2.5 mg  2.5 mg Oral QPM Awilda Helton MD   2.5 mg at 05/27/21 2107    cefTRIAXone (ROCEPHIN) 1 g in 0.9% sodium chloride (MBP/ADV) 50 mL MBP  1 g IntraVENous Q24H Mariya Gregorio C,  mL/hr at 05/27/21 2349 1 g at 05/27/21 2349    hydrALAZINE (APRESOLINE) tablet 10 mg  10 mg Oral TID Jg Res C, DO   10 mg at 05/28/21 1048    hydrALAZINE (APRESOLINE) 20 mg/mL injection 20 mg  20 mg IntraVENous Q6H PRN Jg Res C, DO        lactated Ringers infusion  75 mL/hr IntraVENous CONTINUOUS Josseline Butterfield MD 75 mL/hr at 05/28/21 1044 75 mL/hr at 05/28/21 1044    aspirin chewable tablet 81 mg  81 mg Oral DAILY Brittnee Banerjee, DO   81 mg at 05/28/21 1046    dilTIAZem ER (CARDIZEM CD) capsule 240 mg  240 mg Oral DAILY Brittnee Banerjee, DO   240 mg at 05/28/21 1047    escitalopram oxalate (LEXAPRO) tablet 10 mg  10 mg Oral DAILY Brittnee Banerjee, DO   10 mg at 05/28/21 1046    prasugreL (EFFIENT) tablet 10 mg  10 mg Oral DAILY Brittnee Banerjee, DO   10 mg at 05/28/21 0900    pravastatin (PRAVACHOL) tablet 40 mg  40 mg Oral Q24H Brittnee Banerjee, DO   40 mg at 05/27/21 2108    sodium chloride (NS) flush 5-40 mL  5-40 mL IntraVENous Q8H Brittnee Banerjee, DO   10 mL at 05/28/21 1409    sodium chloride (NS) flush 5-40 mL  5-40 mL IntraVENous PRN Brittnee Banerjee, DO        acetaminophen (TYLENOL) tablet 650 mg  650 mg Oral Q6H PRN Brittnee Banerjee DO   650 mg at 05/28/21 0201    Or    acetaminophen (TYLENOL) suppository 650 mg  650 mg Rectal Q6H PRN Brittnee Banerjee DO        [Held by provider] polyethylene glycol (MIRALAX) packet 17 g  17 g Oral DAILY Junnie Good, DO   17 g at 21 3408    [Held by provider] senna-docusate (PERICOLACE) 8.6-50 mg per tablet 1 Tablet  1 Tablet Oral BID Junnie Good, DO   1 Tablet at 21 0825    magnesium hydroxide (MILK OF MAGNESIA) 400 mg/5 mL oral suspension 30 mL  30 mL Oral DAILY PRN Junnie Good, DO        bisacodyL (DULCOLAX) suppository 10 mg  10 mg Rectal DAILY PRN Junnie Good, DO        ondansetron (ZOFRAN ODT) tablet 4 mg  4 mg Oral Q8H PRN Junnie Good, DO        Or    ondansetron [facility]) injection 4 mg  4 mg IntraVENous Q6H PRN Junnie Good, DO   4 mg at 21 0247    famotidine (PEPCID) tablet 20 mg  20 mg Oral DAILY Junnie Good, DO   20 mg at 21 1046    enoxaparin (LOVENOX) injection 40 mg  40 mg SubCUTAneous DAILY Junnie Good, DO   40 mg at 21 1046    melatonin tablet 5 mg  5 mg Oral QHS Rojas, Thu, DO   5 mg at 21 2108       OBJECTIVE:  No data found. Temp (24hrs), Av.8 °F (37.1 °C), Min:97.8 °F (36.6 °C), Max:99.3 °F (37.4 °C)     0701 -  1900  In: -   Out: 700 [Urine:700]    Physical Exam:  Constitutional: Elderly female in no acute distress, sitting comfortably in the bedside chair. HEENT: Normocephalic and atraumatic. Oropharynx is clear, mucous membranes are moist. Extraocular muscles are intact. Sclerae anicteric. Neck supple without JVD. No thyromegaly present. Lymph node   No palpable submandibular, cervical, supraclavicular, axillary or inguinal lymph nodes. Skin Warm and dry. No bruising and no rash noted. No erythema. No pallor. Respiratory Lungs are clear to auscultation bilaterally without wheezes, rales or rhonchi, normal air exchange without accessory muscle use. CVS Normal rate, regular rhythm and normal S1 and S2. No murmurs, gallops, or rubs. Abdomen Soft, nontender and nondistended, normoactive bowel sounds. No palpable mass. No hepatosplenomegaly. Neuro +pleasantly confused, A&Ox1.  Grossly nonfocal with no obvious sensory or motor deficits. MSK Normal range of motion in general.  No edema and no tenderness. Psych Appropriate mood and affect.         Labs:    Recent Results (from the past 24 hour(s))   AMMONIA    Collection Time: 05/27/21 10:24 PM   Result Value Ref Range    Ammonia 43 (H) 11 - 32 UMOL/L   CBC W/O DIFF    Collection Time: 05/28/21  6:19 AM   Result Value Ref Range    WBC 5.7 4.3 - 11.1 K/uL    RBC 3.10 (L) 4.05 - 5.2 M/uL    HGB 9.8 (L) 11.7 - 15.4 g/dL    HCT 28.1 (L) 35.8 - 46.3 %    MCV 90.6 79.6 - 97.8 FL    MCH 31.6 26.1 - 32.9 PG    MCHC 34.9 31.4 - 35.0 g/dL    RDW 13.6 11.9 - 14.6 %    PLATELET 798 (L) 912 - 450 K/uL    MPV 9.9 9.4 - 12.3 FL    ABSOLUTE NRBC 0.00 0.0 - 0.2 K/uL   METABOLIC PANEL, BASIC    Collection Time: 05/28/21  6:20 AM   Result Value Ref Range    Sodium 143 136 - 145 mmol/L    Potassium 2.9 (L) 3.5 - 5.1 mmol/L    Chloride 109 (H) 98 - 107 mmol/L    CO2 24 21 - 32 mmol/L    Anion gap 10 7 - 16 mmol/L    Glucose 104 (H) 65 - 100 mg/dL    BUN 15 8 - 23 MG/DL    Creatinine 1.54 (H) 0.6 - 1.0 MG/DL    GFR est AA 42 (L) >60 ml/min/1.73m2    GFR est non-AA 34 (L) >60 ml/min/1.73m2    Calcium 8.4 8.3 - 10.4 MG/DL   MAGNESIUM    Collection Time: 05/28/21  6:20 AM   Result Value Ref Range    Magnesium 1.3 (L) 1.8 - 2.4 mg/dL       Imaging:  [unfilled]    ASSESSMENT:  Problem List  Date Reviewed: 6/15/2020        Codes Class Noted    Acute encephalopathy ICD-10-CM: G93.40  ICD-9-CM: 348.30  5/22/2021        Hypokalemia ICD-10-CM: E87.6  ICD-9-CM: 276.8  5/22/2021        Acute renal failure with tubular necrosis (HCC) ICD-10-CM: N17.0  ICD-9-CM: 584.5  5/22/2021        Hypercalcemia ICD-10-CM: M06.01  ICD-9-CM: 275.42  5/21/2021        Female stress incontinence ICD-10-CM: N39.3  ICD-9-CM: 625.6  6/23/2015        Cystocele, midline ICD-10-CM: N81.11  ICD-9-CM: 618.01  6/23/2015        Urge incontinence ICD-10-CM: N39.41  ICD-9-CM: 788.31  5/28/2014        Coronary atherosclerosis of native coronary artery ICD-10-CM: I25.10  ICD-9-CM: 414.01  2/16/2010        Stable angina (HCC) ICD-10-CM: I20.8  ICD-9-CM: 413.9  2/16/2010        Diabetes mellitus type II, controlled ([facility] [address] ICD-10-CM: E11.9  ICD-9-CM: 250.00  2/16/2010        Dyslipidemia ICD-10-CM: E78.5  ICD-9-CM: 272.4  2/16/2010        Essential hypertension, benign ICD-10-CM: I10  ICD-9-CM: 401.1  2/16/2010                PLAN:    Multiple Myeloma, hypo-secretory  - Transfer to [address] to initiate chemotherapy, likely CyBorD, given symptomatic hypercalcemia  - Check B2 microglobulin, uric acid and LDH  - Start allopurinol  - Give rasburicase if indicated  - Place PICC    Hypercalcemia  - PTH low, multiple lytic lesions on imaging  - Ca improved to 8.4 s/p Zometa, calcitonin, IVF  - Monitor    Altered mental status  - MRI negative for intracranial abnormality  - Present on admission with hypercalcemia, which has resolved  - Ammonia slightly elevated at 43, but no hepatic dysfunction - hospitalist adding lactulose prior to transfer  - Will need neuro consult tomorrow    VIET  - Nephrology followed  - Renal function improved  - Outpatient f/u    ? UTI  - UA suggestive of UTI  - UCx NGTD - follow-up culture  - Continues on CTX    CAD  - On ASA/Effient and pravastatin    Continue home meds  Glendy SOPs  VTE prophylaxis - Lovenox (hold plts <50k)    Lab studies and imaging studies were personally reviewed. Pertinent old records were reviewed. Thank you for allowing us to participate in the care of Ms. Anay Louie. [facility]  [address] [address]  Office : [phone]  Fax : [phone]       Attending Addendum:  I have personally performed a face to face diagnostic evaluation on this patient.  I have reviewed and agree with the care plan as documented above by  Irma Zheng N.P.  My findings are as follows: Patient appears stable, heart rate regular without murmurs, abdomen is non-tender, bowel sounds are positive. [de-identified] female reportedly good baseline performance status (lived alone and was independent in her ADLs up till a few weeks ago) history of CAD with stable angina, DM, hypertension, cholecystectomy, hysterectomy admitted with AMS, progressive clinical worsening after recent fall, and hypercalcemia. In-house imaging consistent with lytic bony lesions. SPEP with only small M spike at 0.28 g per DL IgA kappa band. LC normal range. 24-hour UPEP without M spike. Prelim BM biopsy results discussed with hematopathology and are consistent with hypercellular marrow at 90% packed with plasma cells. 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 Diagnosis, prognosis and management options discussed with family members including son and daughter-in-law who are at bedside. Her hypercalcemia has since been treated and normalized though her confusion persists. Recent brain MRI did not reveal any acute abnormality. Her lingering confusion is likely metabolic encephalopathy related in combination with sundowning. Family in agreement to proceed with myeloma directed therapy, and understand higher risks associated with this given worsened performance status. We will [facility] to [facility] with plans for inpatient myeloma directed therapy with likely dose reduced CyBorD regiemen. Will check beta-2 microglobulin, LDH, and uric acid. Allopurinol and rasburicase as needed. Supportive care as above.               Brit Marie MD  [facility] Hematology/Oncology  [id number] [place] [address]  Office : [phone]  Fax : [phone]

## 2021-05-28 NOTE — PROGRESS NOTES
Per DO Baldemar Monzonk has been made for patient to transfer to [address] [place] [address] for further treatment. Patient to be transported via EMS. [facility] [place] will arrange transport. Miesha Shukla with [place], notified of update. No needs identified at this time. Please consult or notify CM if any needs shall arise. CM remains available. Care Management Interventions PCP Verified by CM: Yes Mode of Transport at Discharge: BLS Transition of Care Consult (CM Consult): Discharge Planning Discharge Durable Medical Equipment: No 
Physical Therapy Consult: Yes Occupational Therapy Consult: Yes Speech Therapy Consult: Yes Current Support Network: Lives Alone, Own Home Confirm Follow Up Transport: Family (Patient is able to drive. ) The Plan for Transition of Care is Related to the Following Treatment Goals : Request has been made for patient to transfer to [facility] for further treatment The Patient and/or Patient Representative was Provided with a Choice of Provider and Agrees with the Discharge Plan?: Yes Name of the Patient Representative Who was Provided with a Choice of Provider and Agrees with the Discharge Plan: Patient's Son Fatoumata Tong [employer] Information Provided?: No 
Discharge Location Discharge Placement: Transferred to higher level of care ([facility])

## 2021-05-28 NOTE — PROGRESS NOTES
[facility] Hospitalist Progress Note Name:  Danilo Edmonds  Age:80 y.o. Sex:female :  1940 MRN:  [id number] Admit Date:  2021 Reason for Admission: Hypercalcemia [E83.52] Hospital Course/Interval history:  
 
 
Danilo Edmonds is a [de-identified]years old female with h/o CAD, HTN, HLD, admitted on  for acute encephalopathy secondary to hypercalcemia which is strongly suspected to be from underlying multiple myeloma based on radiographic evidence of translucent skull lesions on CT head. Pt also had acute renal failure with creatinine of 1.5. pt has suppressed PTH intact and vitamin D levels. ONcology following. Workup notable for small M spike and negative 24 hour urine. BM biopsy  Subjective (21): 
 
Resting comfortably. Reports some back pain that seems chronic per report. Family at bedside. Questions answered. Review of Systems: 14 point review of systems is otherwise negative with the exception of the elements mentioned above. Assessment & Plan Plan: #Acute encephalopathy: 
Likely Secondary to dehydration and hypercalcemia MRI brain non acute apart from lytic skull lesions TSH wnl.  
 
 - despite correction of calcium family reports she is still not at baseline. ?if underlying dementia but not by family reports. UA borderline for UTI - will treat. Trial of zyprexa for report of ?. Check ammonia # Possible UTI 
 - +4 bacteria,  5-10 WBC. Start Rocephin. #Hypercalcemia: Resolved S/p Zometa and Calcitonin Patient was on HCTZ as outpatient that could have also contributed to hypercalcemia however given the radiographic evidence of multiple lytic lesion in her skull multiple myeloma is suspected. Status post IR guided bone marrow biopsy on , pending pathology results. Follow-up with SPEP and serum free light chains. Ordered for skeletal survey and urine protein and PEP  
 
 - Small M spike.  CT CAP notable for R ilium lytic lesion 3.7 x 4.3. path pending.  
  
#Acute renal failure: Resolving Renal US unremarkable Suspect prerenal vs ATN in setting of NSAIDS 
 
5/27 - Cr stable at 1.5 (still above baseline of <1). Cont SLOW IVF 
  
#Hypokalemia: Resolved 5/27 - Repeat in AM, add daily supplement 
  #Hypomagnesemia: 
 
5/27 - Repeat in AM 
  
#HTN: 
 
5/27 - suboptimally controlled. Cont home rx cardizem. Add hydralazine 
  #Anxiety depression: 
Continue Lexapro 10 mg p.o. daily 
  
#GI prophylaxis with Pepcid 
  
#CAD: 
Continue aspirin and Effient with Pravachol. 
  
#DVT prophylaxis with Lovenox 
  
 
Diet:  DIET REGULAR 
DVT PPx: lovenox Code status: Full Code Disposition/Expected LOS: 
 
 Plan for [facility]. Likely medically ready in 1-2 days. Will d/w Oncology ?need to wait for BM BX? Objective:  
 
Patient Vitals for the past 24 hrs: 
 Temp Pulse Resp BP SpO2  
05/27/21 2113 97.8 °F (36.6 °C) 82 20 (!) 177/85 98 % 05/27/21 1556 99.3 °F (37.4 °C) 86 20 (!) 173/79 98 % 05/27/21 1054 99.8 °F (37.7 °C) 85 20 (!) 174/63 98 % 05/27/21 0820 98.8 °F (37.1 °C) 85 18 (!) 171/82 98 % 05/27/21 0204 99.1 °F (37.3 °C) 90 18 (!) 170/81 96 % 05/27/21 0037 98.9 °F (37.2 °C) 91 18 (!) 158/96 97 % Oxygen Therapy O2 Sat (%): 98 % (05/27/21 2113) Pulse via Oximetry: 60 beats per minute (05/24/21 1622) O2 Device: None (Room air) (05/26/21 1109) O2 Flow Rate (L/min): 3 l/min (05/24/21 1523) ETCO2 (mmHg): 42 mmHg (05/24/21 1543) Body mass index is 30.5 kg/m². Physical Exam:  
General:  No acute distress, speaking in full sentences, no use of accessory muscles Lungs:  Clear to auscultation bilaterally CV:  Regular rate and rhythm with normal S1 and S2 Abdomen:  Soft, nontender, nondistended, normoactive bowel sounds Extremities:  No cyanosis clubbing or edema Neuro:  Nonfocal, awake, alert. Oriented x 1 only. Psych:  Normal affect Data Review: 
I have reviewed all labs, meds, and studies from the last 24 hours: 
 
Labs: 
 
Recent Results (from the past 24 hour(s)) METABOLIC PANEL, BASIC Collection Time: 05/27/21  4:58 AM  
Result Value Ref Range Sodium 140 136 - 145 mmol/L Potassium 3.2 (L) 3.5 - 5.1 mmol/L Chloride 107 98 - 107 mmol/L  
 CO2 25 21 - 32 mmol/L Anion gap 8 7 - 16 mmol/L Glucose 102 (H) 65 - 100 mg/dL BUN 20 8 - 23 MG/DL Creatinine 1.53 (H) 0.6 - 1.0 MG/DL  
 GFR est AA 42 (L) >60 ml/min/1.73m2 GFR est non-AA 35 (L) >60 ml/min/1.73m2 Calcium 8.9 8.3 - 10.4 MG/DL All Micro Results Procedure Component Value Units Date/Time CULTURE, BLOOD [[id number]] Collected: 05/26/21 1111 Order Status: Completed Specimen: Blood Updated: 05/27/21 1749 Special Requests: --     
  RIGHT 
HAND Culture result: NO GROWTH AFTER 18 HOURS     
 CULTURE, BLOOD [[id number]] Collected: 05/26/21 1207 Order Status: Completed Specimen: Blood Updated: 05/27/21 5962 Special Requests: --     
  LEFT 
HAND Culture result: NO GROWTH AFTER 17 HOURS     
  
 
 
EKG Results Procedure 720 Value Units Date/Time EKG 12 LEAD INITIAL [[id number]] Collected: 05/21/21 2035 Order Status: Completed Updated: 05/21/21 2056 Ventricular Rate 73 BPM   
  Atrial Rate 202 BPM   
  QRS Duration 92 ms Q-T Interval 366 ms   
  QTC Calculation (Bezet) 403 ms Calculated R Axis 75 degrees Calculated T Axis 38 degrees Diagnosis --  
  !! AGE AND GENDER SPECIFIC ECG ANALYSIS !! Normal sinus rhythm Nonspecific ST and T wave abnormality Abnormal ECG When compared with ECG of 17-FEB-2010 06:42, 
Junctional rhythm has replaced Sinus rhythm Confirmed by Maryam Sanchez MD (), Nathan Rasmussen ([id number]) on 5/21/2021 8:56:19 PM 
  
  
 
 
Other Studies: 
CT CHEST ABD PELV W CONT Result Date: 5/27/2021 CT CHEST, ABDOMEN AND PELVIS WITH INTRAVENOUS CONTRAST DATED 5/27/2021. History: Lytic bone lesions. Evaluate for malignancy. Comparison: None. Technique:   Multiple contiguous helical CT images reconstructed at 5 mm were obtained from the base of the neck to the ischial tuberosities following oral and 100 cc Isovue-370 without acute complication. All CT scans performed at this facility use one or all of the following: Automated exposure control, adjustment of the mA and/or kVp according to patient's size, iterative reconstruction. Findings: CT Chest: The base of the neck is unremarkable in appearance. No axillary, mediastinal, or hilar lymphadenopathy is seen. The thoracic aorta is normal in caliber. Evaluation with lung windows demonstrates no clearly worrisome primary pulmonary mass. Small scattered right lung nodules are seen on images 17, 28 and 37 which are not felt to be clearly indicative of pulmonary metastases. Specifically, the nodular density seen on image 28 resides within the right major fissure, and the nodular density seen on image 37 may represent a benign vascular structure. No pleural effusion is seen. Lungs are expanded without evidence for pneumothorax. Multiple small lucencies are seen most evident and thoracic vertebrae which is best appreciated in the left lower endplate of F25 on axial image 51 concerning for additional smaller lytic lesions. CT ABDOMEN:  Assessment of the abdomen is somewhat limited by patient breathing motion artifact. No clearly demonstrated defined lesion is seen of the liver, spleen, or pancreas. No contour deforming or enhancing mass lesions are seen of the adrenal glands. The gallbladder has been removed. Assessment of the kidneys is also limited by patient breathing motion artifact without a clearly demonstrated contour deforming mass seen. The visualized loops of small bowel and colon are normal in caliber. Mild to moderate diverticulosis is seen of the left colon most prominently involving the proximal sigmoid colon. No free fluid and no free air is seen in the abdomen.  No adenopathy is seen of the abdomen. The abdominal aorta demonstrates moderate atherosclerotic calcification. Once again, multiple small lucencies are seen suggesting additional small lytic lesions. CT PELVIS: No abnormal pelvic fluid collections are present. No pelvic adenopathy is seen. The urinary bladder demonstrates minimal nondependent gas which is likely iatrogenic. However, the urinary bladder otherwise appears thick walled and demonstrates mild stranding of adjacent pelvic fat which suggest acute inflammation. No clearly demonstrated defined bladder wall mass is seen, however. A clearly abnormal lytic lesion is seen involving the right iliac bone on axial image 92 with associated masslike density measuring 4.3 cm x 3.7 cm in size. Additional smaller lucencies are seen which could represent additional tiny lytic lesions. 1.  No clear primary malignancy seen. Assessment for a subtle process is somewhat limited by patient breathing motion particularly with imaging through the upper and mid abdomen. 2. Multiple additional bony lesions suggested most evident in the right ilium where a lytic lesion with associated soft tissue component is seen measuring 4.3 cm x 3.7 cm in size. This likely represents additional bony changes from the patient's known lytic disease. No clear metastatic lesions are otherwise suggested. Small right lung nodules are seen. Some of these demonstrate features which favor benign nodules. In general, these are felt to be nonspecific and not clearly indicative of pulmonary metastases although attention on follow-up studies is recommended. 3. Inflamed appearance of the urinary bladder. Recommend correlation with urinalysis, and clinical exam. This report was made using voice transcription.  Despite my best efforts to avoid any, transcription errors may persist. If there is any question about the accuracy of the report or need for clarification, then please call [phone], or text me through perfectserv for clarification or correction. Current Meds:  
Current Facility-Administered Medications Medication Dose Route Frequency  OLANZapine (ZyPREXA) tablet 2.5 mg  2.5 mg Oral QPM  
 lactated Ringers infusion  75 mL/hr IntraVENous CONTINUOUS  
 aspirin chewable tablet 81 mg  81 mg Oral DAILY  dilTIAZem ER (CARDIZEM CD) capsule 240 mg  240 mg Oral DAILY  escitalopram oxalate (LEXAPRO) tablet 10 mg  10 mg Oral DAILY  prasugreL (EFFIENT) tablet 10 mg  10 mg Oral DAILY  pravastatin (PRAVACHOL) tablet 40 mg  40 mg Oral Q24H  
 sodium chloride (NS) flush 5-40 mL  5-40 mL IntraVENous Q8H  
 sodium chloride (NS) flush 5-40 mL  5-40 mL IntraVENous PRN  
 acetaminophen (TYLENOL) tablet 650 mg  650 mg Oral Q6H PRN Or  
 acetaminophen (TYLENOL) suppository 650 mg  650 mg Rectal Q6H PRN  
 [Held by provider] polyethylene glycol (MIRALAX) packet 17 g  17 g Oral DAILY  [Held by provider] senna-docusate (PERICOLACE) 8.6-50 mg per tablet 1 Tablet  1 Tablet Oral BID  magnesium hydroxide (MILK OF MAGNESIA) 400 mg/5 mL oral suspension 30 mL  30 mL Oral DAILY PRN  
 bisacodyL (DULCOLAX) suppository 10 mg  10 mg Rectal DAILY PRN  
 ondansetron (ZOFRAN ODT) tablet 4 mg  4 mg Oral Q8H PRN Or  
 ondansetron (ZOFRAN) injection 4 mg  4 mg IntraVENous Q6H PRN  
 famotidine (PEPCID) tablet 20 mg  20 mg Oral DAILY  enoxaparin (LOVENOX) injection 40 mg  40 mg SubCUTAneous DAILY  melatonin tablet 5 mg  5 mg Oral QHS Problem List: 
Hospital Problems as of 5/27/2021 Date Reviewed: 6/15/2020 Codes Class Noted - Resolved POA Acute encephalopathy ICD-10-CM: G93.40 ICD-9-CM: 348.30  5/22/2021 - Present Unknown Hypokalemia ICD-10-CM: E87.6 ICD-9-CM: 276.8  5/22/2021 - Present Unknown Acute renal failure with tubular necrosis (HCC) ICD-10-CM: N17.0 ICD-9-CM: 584.5  5/22/2021 - Present Unknown  * (Principal) Hypercalcemia ICD-10-CM: B90.47 
ICD-9-CM: 275.42  5/21/2021 - Present Unknown Diabetes mellitus type II, controlled ([facility] ICD-10-CM: E11.9 ICD-9-CM: 250.00  2/16/2010 - Present Yes Dyslipidemia ICD-10-CM: E78.5 ICD-9-CM: 272.4  2/16/2010 - Present Yes Essential hypertension, benign ICD-10-CM: I10 
ICD-9-CM: 401.1  2/16/2010 - Present Yes Part of this note was written by using a voice dictation software and the note has been proof read but may still contain some grammatical/other typographical errors. Signed By: Jaqui Grider  [facility]  May 27, 2021  5:15 PM

## 2021-05-28 NOTE — PROGRESS NOTES
Pt resting in bed at this time. Pt did not sleep well this shift. Zyprexa along with melatonin was not effective. Pt son at bedside. Hydralazine added this shift for hypertension. Bed in low position and call light/ personal items within reach. Will continue to monitor and give bedside shift report to oncoming day shift nurse.

## 2021-05-28 NOTE — PROGRESS NOTES
ACUTE PHYSICAL THERAPY GOALS: 
(Developed with and agreed upon by patient and/or caregiver. ) LTG: (Reviewed & Updated 5/26/21) 
(1.)Ms. Silvio Marc will move from supine to sit and sit to supine , scoot up and down and roll side to side in bed with CONTACT GUARD ASSIST within 7 treatment day(s). (2.)Ms. Silvio Marc will transfer from bed to chair and chair to bed with CONTACT GUARD ASSIST using the least restrictive device within 7 treatment day(s). (3.)Ms. Silvio Marc will ambulate with CONTACT GUARD for 100 feet with the least restrictive device within 7 treatment day(s). (4.)Ms. Silvio Marc will perform standing static and dynamic standing balance activities x 25 minutes with CONTACT GUARD ASSIST to improve safety and activity tolerance within 7 treatment day(s). (5.)Ms. Silvio Marc will perform therapeutic exercises x 15 minutes for HEP with SUPERVISION to improve strength, endurance, and functional mobility within 7 treatment day(s). PHYSICAL THERAPY: Daily Note and AM Treatment Day # 4 Abel Cummins is a [de-identified] y.o. female PRIMARY DIAGNOSIS: Hypercalcemia Hypercalcemia [E83.52] ASSESSMENT:  
 
REHAB RECOMMENDATIONS: CURRENT LEVEL OF FUNCTION: 
(Most Recently Demonstrated) Recommendation to date pending progress: 
Setting:  Short-term Rehab Equipment:  To Be Determined Bed Mobility:  Moderate Assistance x 2 Sit to Stand:  Moderate Assistance x 2 Transfers:  Moderate Assistance x 2 Gait/Mobility:  Minimal Assistance x 2  
 
ASSESSMENT: 
Ms. Silvio Marc presents resting in bed,  son and daughter in law present. Pt presents more confused this AM, requiring frequent redirection and cues for attention to task and performing mobility. Requiring more assist today, overall Mod Ax2 for mobility. Sit <> stand transfers, standing and seated balance activities, bed mobility, and steps to chair. Pt quite unsteady on her feet, with delayed BLE progression performing steps to chair.  No real progress made today due to pt cognitive status. Pt will continue to benefit from skilled PT during her acute stay. SUBJECTIVE:  
Ms. Oralia Gaffney states, \"I don't think I can stand. \" SOCIAL HISTORY/ LIVING ENVIRONMENT: lives alone, 2 recent falls, mod I w/ RW, adult son lives in town Home Environment: Private residence # Steps to Enter: 0 (ramp) One/Two Story Residence: One story Living Alone: Yes Support Systems: Child(ericka) OBJECTIVE:  
 
PAIN: VITAL SIGNS: LINES/DRAINS:  
Pre Treatment: Pain Screen Pain Scale 1: Numeric (0 - 10) Pain Intensity 1: 0 Post Treatment: 0/10 Vital Signs O2 Device: None (Room air) IV and Purewick O2 Device: None (Room air) MOBILITY: I Mod I S SBA CGA Min Mod Max Total  NT x2 Comments:  
Bed Mobility Rolling [] [] [] [] [] [] [x] [] [] [] [x] Supine to Sit [] [] [] [] [] [] [x] [] [] [] [x] Scooting [] [] [] [] [] [] [x] [] [] [] [x] Sit to Supine [] [] [] [] [] [] [x] [] [] [] [x] Transfers Sit to Stand [] [] [] [] [] [] [x] [] [] [] [x] Bed to Chair [] [] [] [] [] [] [x] [] [] [] [x] Stand to Sit [] [] [] [] [] [] [x] [] [] [] [x] I=Independent, Mod I=Modified Independent, S=Supervision, SBA=Standby Assistance, CGA=Contact Guard Assistance,  
Min=Minimal Assistance, Mod=Moderate Assistance, Max=Maximal Assistance, Total=Total Assistance, NT=Not Tested BALANCE: Good Fair+ Fair Fair- Poor NT Comments Sitting Static [] [x] [] [] [] [] Sitting Dynamic [] [] [x] [] [] []   
         
Standing Static [] [] [] [x] [] []   
Standing Dynamic [] [] [] [x] [] [] GAIT: I Mod I S SBA CGA Min Mod Max Total  NT x2 Comments:  
Level of Assistance [] [] [] [] [] [x] [] [] [] [] [x] Distance 5 ft to chair DME [facility] [place] Gait Quality Short, shuffled, slow steps Weightbearing Status N/A I=Independent, Mod I=Modified Independent, S=Supervision, SBA=Standby Assistance, CGA=Contact Guard Assistance,  
Min=Minimal Assistance, Mod=Moderate Assistance, Max=Maximal Assistance, Total=Total Assistance, NT=Not Tested PLAN:  
FREQUENCY/DURATION: PT Plan of Care: 3 times/week for duration of hospital stay or until stated goals are met, whichever comes first. 
TREATMENT:  
 
TREATMENT:  
($$ Therapeutic Activity: 23-37 mins    ) Co-Treatment PT/OT necessary due to patient's decreased overall endurance/tolerance levels, as well as need for high level skilled assistance to complete functional transfers/mobility and functional tasks Therapeutic Activity (28 Minutes): Therapeutic activity included Rolling, Supine to Sit, Sit to Supine, Scooting, Lateral Scooting, Transfer Training, Ambulation on level ground, Sitting balance  and Standing balance to improve functional Mobility, Strength and Activity tolerance. TREATMENT GRID: 
N/A 
 
AFTER TREATMENT POSITION/PRECAUTIONS: 
Alarm Activated, Chair, Needs within reach, RN notified and Visitors at bedside INTERDISCIPLINARY COLLABORATION: 
RN/PCT, PT/PTA and OT/RECINOS TOTAL TREATMENT DURATION: 
PT Patient Time In/Time Out Time In: 0150 Time Out: 1000 Chi Castro PT

## 2021-05-29 PROBLEM — C90.00 MULTIPLE MYELOMA (HCC): Status: ACTIVE | Noted: 2021-01-01

## 2021-05-29 NOTE — PROGRESS NOTES
Inpatient Hematology / Oncology Progress Note      Admission Date: 2021  5:40 PM  Reason for Admission/Hospital Course: Multiple Myeloma      24 Hour Events:  Transferred to ES  Son at bedside   Confusion is ongoing  Awaiting PICC placement  Ativan helpful for sleep per son, would like to continue to give earlier  Sleeping on exam  UC + gram negative rods-continue Rocephin      ROS:  Unable to obtain due to confusion and sleepy. 10 point review of systems is otherwise negative with the exception of the elements mentioned above in the HPI. Allergies   Allergen Reactions    Pcn [Penicillins] Rash       OBJECTIVE:  Patient Vitals for the past 8 hrs:   Height   21 0958 5' 4\" (1.626 m)     Temp (24hrs), Av.5 °F (36.9 °C), Min:98 °F (36.7 °C), Max:99.1 °F (37.3 °C)    No intake/output data recorded. Physical Exam:  Constitutional: Elderly female in no acute distress, sleeping comfortably in the hospital bed. HEENT: Normocephalic and atraumatic. Neck supple without JVD. No thyromegaly present. Skin Warm and dry. No bruising and no rash noted. No erythema. No pallor. Respiratory Lungs are clear to auscultation bilaterally without wheezes, rales or rhonchi, normal air exchange without accessory muscle use. CVS Normal rate, regular rhythm and normal S1 and S2. No murmurs, gallops, or rubs. Abdomen Soft, nontender and nondistended, normoactive bowel sounds. No palpable mass. No hepatosplenomegaly. Neuro +confused, unable to fully arose to have conversation    MSK  +BLE edema and no tenderness.    Psych Unable to assess       Labs:      Recent Labs     21  0603 21  0619   WBC 6.5 5.7   RBC 3.17* 3.10*   HGB 9.9* 9.8*   HCT 28.4* 28.1*   MCV 89.6 90.6   MCH 31.2 31.6   MCHC 34.9 34.9   RDW 14.1 13.6   * 100*        Recent Labs     21  0603 21  0620 21  0458    143 140   K 3.2* 2.9* 3.2*   * 109* 107   CO2 22 24 25   AGAP 10 10 8 * 104* 102*   BUN 16 15 20   CREA 1.67* 1.54* 1.53*   GFRAA 38* 42* 42*   GFRNA 31* 34* 35*   CA 8.7 8.4 8.9   MG  --  1.3*  --          Imaging:      ASSESSMENT:    Problem List  Date Reviewed: 6/15/2020        Codes Class Noted    Mild protein-calorie malnutrition ([facility] ICD-10-CM: E44.1  ICD-9-CM: 263.1  5/28/2021        Acute encephalopathy ICD-10-CM: G93.40  ICD-9-CM: 348.30  5/22/2021        Hypokalemia ICD-10-CM: E87.6  ICD-9-CM: 276.8  5/22/2021        Acute renal failure with tubular necrosis (HCC) ICD-10-CM: N17.0  ICD-9-CM: 584.5  5/22/2021        Hypercalcemia ICD-10-CM: E83.52  ICD-9-CM: 275.42  5/21/2021        Female stress incontinence ICD-10-CM: N39.3  ICD-9-CM: 625.6  6/23/2015        Cystocele, midline ICD-10-CM: N81.11  ICD-9-CM: 618.01  6/23/2015        Urge incontinence ICD-10-CM: N39.41  ICD-9-CM: 788.31  5/28/2014        Coronary atherosclerosis of native coronary artery ICD-10-CM: I25.10  ICD-9-CM: 414.01  2/16/2010        Stable angina (HCC) ICD-10-CM: I20.8  ICD-9-CM: 413.9  2/16/2010        Diabetes mellitus type II, controlled ([facility] ICD-10-CM: E11.9  ICD-9-CM: 250.00  2/16/2010        Dyslipidemia ICD-10-CM: E78.5  ICD-9-CM: 272.4  2/16/2010        Essential hypertension, benign ICD-10-CM: I10  ICD-9-CM: 401.1  2/16/2010                PLAN:  Multiple Myeloma, hypo-secretory  - Transfer to [address] to initiate chemotherapy, likely CyBorD, given symptomatic hypercalcemia  - Check B2 microglobulin, uric acid and LDH  - Start allopurinol  - Give rasburicase if indicated  - Place PICC  5/29 awaiting PICC placement, uric acid 3.0-no need for rasburicase,      Hypercalcemia  - PTH low, multiple lytic lesions on imaging  - Ca improved to 8.4 s/p Zometa, calcitonin, IVF  - Monitor  5/29 ca+ 8.7     Altered mental status  - MRI negative for intracranial abnormality  - Present on admission with hypercalcemia, which has resolved  - Ammonia slightly elevated at 43, but no hepatic dysfunction - hospitalist adding lactulose prior to transfer  - Will need neuro consult tomorrow  5/29 awaiting neurology, ammonia 17     VIET  - Nephrology followed  - Renal function improved  - Outpatient f/u  5/29 Cr 1.67     ?UTI  - UA suggestive of UTI  - UCx NGTD - follow-up culture  - Continues on CTX  5/29-awaiting PICC placement to resume CTX, UC + for gram negative rods     CAD  - On ASA/Effient and pravastatin     Continue home meds  Glendy SOPs  VTE prophylaxis - Lovenox (hold plts <50k)          Richie Garcia NP  [facility] Hematology and Oncology  [address] [place] [address]  Office : [phone]  Fax : [phone]        Attending Addendum:  I have personally performed a face to face diagnostic evaluation on this patient. I have reviewed and agree with the care plan as documented above by Cleopatra Murphy N.P.  My findings are as follows: Patient appears stable, heart rate regular without murmurs, abdomen is non-tender, bowel sounds are positive. [de-identified] female reportedly good baseline performance status (lived alone and was independent in her ADLs up till a few weeks ago) history of CAD with stable angina, DM, hypertension, cholecystectomy, hysterectomy admitted with AMS, progressive clinical worsening after recent fall, and hypercalcemia. In-house imaging consistent with lytic bony lesions. SPEP with only small M spike at 0.28 g per DL IgA kappa band. LC normal range. 24-hour UPEP without M spike. Prelim BM biopsy results discussed with hematopathology and are consistent with hypercellular marrow at 90% packed with plasma cells. 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 Diagnosis, prognosis and management options discussed with family members including son and daughter-in-law who are at bedside. Her hypercalcemia has since been treated and normalized though her confusion persists. Recent brain MRI did not reveal any acute abnormality.   Her lingering confusion is likely metabolic encephalopathy related in combination with sundowning. Family in agreement to proceed with myeloma directed therapy, and understand higher risks associated with this given worsened performance status. Admitted to [facility] with plans for inpatient myeloma directed therapy with likely dose reduced CyBorD regimen. Uric acid normal.  started Allopurinol. Will get neurology opinion. Will consider therapy initiation once clinically/mentation perspective shows some improvement. Await PICC line placement. Possible UTI, on ceftriaxone.    Supportive care as above.                Marleni Montes MD  [address] [place] [address] Hematology/Oncology  [address] [place] [address]  Office : [phone]  Fax : [phone]

## 2021-05-29 NOTE — PROGRESS NOTES
JUSTA met with the patient, her son Macie Maria, and daughter-in-law Toni Hewittggbarry. Macie Maria states that his mother lives alone in her home in [place] and he lives nearby in [place]. The patient sees Dr. Carley Etienne for primary care and is current. Per Macie Maria, the patient uses a cane or walker to ambulate and has had several falls recently. He also states that at baseline she's independent in her ADLs. SW asked about HH or STR. Macie Maria states that Steffaniedebora Sprague at [facility] referred the [facility] This referral did not carry over in [facility] \"Destination\" so SW will resend. Will ensure PPD is completed and COVID will need to be placed closer to discharge. Will plan for STR at this time but can switch to [facility] if that is ultimately recommended.      Grace Wooten LMSW     [facility]    * [email or web address]

## 2021-05-29 NOTE — PROGRESS NOTES
PT/OT note:  PT spoke with RN regarding patient. RN reports patient is not following commands. RN feels like it would be unsafe to mobilize patient at this time. Please consult back with therapy when patient is more appropriate.

## 2021-05-29 NOTE — PROGRESS NOTES
Patient is very confused. Son states patient is very confused and tried to leave her bed all night long. Patient's son has requested something to help his mom sleep for tonight. NP, Steffanie Sprague notified and order received for 0.5mg Ativan 1x.

## 2021-05-29 NOTE — PROGRESS NOTES
Nutrition: Acknowledge consult for poor intake >5 days per Dr. Tanya Vega. Pt seen by Nash Padilla RD yesterday per consult for poor intake (Dr. Tanya Vega). Please see note on 5/28. Interventions initiated by RD confirms for this admission. Added finger food preferences to pt's meal ticket. Follow up scheduled for 6/3. Will defer assessment today. Please consult RD if additional concerns are not addressed in initial assessment yesterday.     [facility], [facility] [address], [address], [address], [address] [facility]

## 2021-05-29 NOTE — CONSULTS
[facility] Neurology [facility]  [address]  [address] [facility], [address] [facility]          No chief complaint on file. Ricardo Peng is a [de-identified] y.o. female who presents on referral from the oncology service at [facility] [address] for evaluation with reference to abnormal mental status. Examination is a little compromised from the standpoint of it being a telemedicine evaluation and is therefore I was not present to specifically assess muscle tone on each side etc.      Past Medical History:   Diagnosis Date    Arthritis     Coronary atherosclerosis of native coronary artery     Diabetes mellitus type II, controlled ([address] 2/16/2010    Hypertension     Stable angina ([facility] [address]     Stable angina (HCC)        Past Surgical History:   Procedure Laterality Date    HX APPENDECTOMY      HX CHOLECYSTECTOMY      HX HEART CATHETERIZATION  2010    Pt. had a heart attack during this procedure     HX HYSTERECTOMY  1974    OR LAP,CHOLECYSTECTOMY  2000       Family History   Problem Relation Age of Onset   [facility] Cancer Mother     Cancer Father     Breast Cancer Neg Hx        Social History     Socioeconomic History    Marital status:      Spouse name: Not on file    Number of children: Not on file    Years of education: Not on file    Highest education level: Not on file   Tobacco Use    Smoking status: Never Smoker    Smokeless tobacco: Never Used   Substance and Sexual Activity    Alcohol use: No     Social Determinants of Health     Financial Resource Strain:     Difficulty of Paying Living Expenses:    Food Insecurity:     Worried About Running Out of Food in the Last Year:     Ran Out of Food in the Last Year:    Transportation Needs:     Lack of Transportation (Medical):      Lack of Transportation (Non-Medical):    Physical Activity:     Days of Exercise per Week:     Minutes of Exercise per Session:    Stress:     Feeling of Stress :    Social Connections:     Frequency of Communication with Friends and Family:     Frequency of Social Gatherings with Friends and Family:     Attends Gnosticism Services:     Active Member of Clubs or Organizations:     Attends Club or Organization Meetings:     Marital Status:            Current Facility-Administered Medications:     folic acid (FOLVITE) 1 mg, thiamine (B-1) 100 mg in 0.9% sodium chloride 50 mL ivpb, , IntraVENous, DAILY, Renzo Silvestre MD    allopurinoL (ZYLOPRIM) tablet 300 mg, 300 mg, Oral, BID, SACHIN Barkley, 300 mg at 05/29/21 6199    acetaminophen (TYLENOL) tablet 650 mg, 650 mg, Oral, Q6H PRN **OR** acetaminophen (TYLENOL) suppository 650 mg, 650 mg, Rectal, Q6H PRN, Jg Venus C, DO    bisacodyL (DULCOLAX) suppository 10 mg, 10 mg, Rectal, DAILY PRN, Gregorio, Venus C, DO    enoxaparin (LOVENOX) injection 40 mg, 40 mg, SubCUTAneous, DAILY, Julio Gregorioison C, DO, 40 mg at 05/29/21 0913    famotidine (PEPCID) tablet 20 mg, 20 mg, Oral, DAILY, Gregorio, Venus C, DO, 20 mg at 05/29/21 0913    magnesium hydroxide (MILK OF MAGNESIA) 400 mg/5 mL oral suspension 30 mL, 30 mL, Oral, DAILY PRN, Gregorio, Venus C, DO    ondansetron (ZOFRAN ODT) tablet 4 mg, 4 mg, Oral, Q8H PRN **OR** ondansetron (ZOFRAN) injection 4 mg, 4 mg, IntraVENous, Q6H PRN, Gregorio, Venus C, DO    sodium chloride (NS) flush 5-40 mL, 5-40 mL, IntraVENous, Q8H, Gregorio, Venus C, DO, 10 mL at 05/28/21 2150    sodium chloride (NS) flush 5-40 mL, 5-40 mL, IntraVENous, PRN, Gregorio, Venus C, DO    aspirin chewable tablet 81 mg, 81 mg, Oral, DAILY, Gregorio, Venus C, DO, 81 mg at 05/29/21 0913    cefTRIAXone (ROCEPHIN) 1 g in 0.9% sodium chloride (MBP/ADV) 50 mL MBP, 1 g, IntraVENous, Q24H, Venus Gregorio, , Last Rate: 100 mL/hr at 05/28/21 2149, 1 g at 05/28/21 2149    dilTIAZem ER (CARDIZEM CD) capsule 240 mg, 240 mg, Oral, DAILY, Venus Gregorio DO, 240 mg at 05/29/21 0913    escitalopram oxalate (LEXAPRO) tablet 10 mg, 10 mg, Oral, DAILY, Jg Bre Meehan, DO, 10 mg at 05/29/21 0913    hydrALAZINE (APRESOLINE) 20 mg/mL injection 20 mg, 20 mg, IntraVENous, Q6H PRN, Gregorio, Venus C, DO    hydrALAZINE (APRESOLINE) tablet 10 mg, 10 mg, Oral, TID, Gregorio, Venus C, DO, 10 mg at 05/29/21 0913    lactated Ringers infusion, 75 mL/hr, IntraVENous, CONTINUOUS, JgRenelaly Pock, DO, Last Rate: 75 mL/hr at 05/28/21 1833, 75 mL/hr at 05/28/21 1833    magnesium oxide (MAG-OX) tablet 400 mg, 400 mg, Oral, DAILY, Gregorio, Venus C, DO, 400 mg at 05/29/21 0913    melatonin tablet 5 mg (Patient Supplied), 5 mg, Oral, QHS, Gregorio, Venus C, DO    OLANZapine (ZyPREXA) tablet 5 mg, 5 mg, Oral, QPM, Jg, Venus C, DO, 5 mg at 05/28/21 1833    potassium chloride (K-DUR, KLOR-CON) SR tablet 40 mEq, 40 mEq, Oral, BID, Gregorio, Bre Meehan, DO, 40 mEq at 05/29/21 0913    prasugreL (EFFIENT) tablet 10 mg, 10 mg, Oral, DAILY, Gregorio, Venus C, DO, 10 mg at 05/29/21 2151    pravastatin (PRAVACHOL) tablet 40 mg, 40 mg, Oral, Q24H, GregorioBre, DO, 40 mg at 05/28/21 2149    Allergies   Allergen Reactions    Pcn [Penicillins] Rash       Review of Systems  The patient is not sufficiently responsive to respond to any review of questions  Visit Vitals  /86 (BP 1 Location: Left arm, BP Patient Position: At rest)   Pulse 80   Temp 98 °F (36.7 °C)   Resp 18   Wt 184 lb 6.4 oz (83.6 kg)   SpO2 100%   BMI 31.65 kg/m²       Neurologic Exam  The patient is not sufficiently responsive to perform any aspect of the neurologic exam with her family in the room stimulating her vigorously she will transiently focus on the screen without specific recognition.   She is unable to recognize any object held up by the examiner including a pen or cell phone  Son held the patient's arms up and she helped him up for a period of about 30 seconds during which time this was sufficient to ascertain she had no evidence of a metabolic flap    No evidence of a startle response was seen with family vigorously stimulating her    Most recent MRI  Results from [place] encounter on 05/21/21    MRI BRAIN WO CONT    Narrative  EXAMINATION: BRAIN MRI 5/26/2021 5:44 PM    INDICATION: Altered mental status. COMPARISON: CT head May 21, 2021    TECHNIQUE: Multiplanar multisequence MRI of the brain without intravenous  contrast.    FINDINGS:    No evidence of acute or recent infarct. No evidence of recent hemorrhage. Normal  size of ventricles and extra-axial spaces for age. Scattered white matter FLAIR  hyperintensities, mild. Normal vascular flow voids. The calvarial and cervical spine marrow is diffusely heterogenous with numerous  lesions, similar to CT head May 21, 2021. Grossly normal orbital structures. Essentially clear paranasal sinuses and mastoid air cells. No abnormality of  extracranial soft tissues. Impression  1. No acute intracranial abnormality  2. Redemonstrated diffuse heterogeneity and numerous lesions of the calvarial  and cervical marrow which can reflect diffuse metastatic disease, myeloma, or  lymphoma. Reviewed on the PACS system. No evidence seen of limbic encephalitis  Most recent MRA      Most recent CTA  Results from Hospital Encounter encounter on 05/21/21    CT CHEST ABD PELV W CONT    Narrative  CT CHEST, ABDOMEN AND PELVIS WITH INTRAVENOUS CONTRAST DATED 5/27/2021. History: Lytic bone lesions. Evaluate for malignancy. Comparison: None. Technique:   Multiple contiguous helical CT images reconstructed at 5 mm were  obtained from the base of the neck to the ischial tuberosities following oral  and 100 cc [id number] without acute complication. All CT scans performed at  this facility use one or all of the following: Automated exposure control,  adjustment of the mA and/or kVp according to patient's size, iterative  reconstruction. Findings:  CT Chest:  The base of the neck is unremarkable in appearance.   No axillary, mediastinal,  or hilar lymphadenopathy is seen. The thoracic aorta is normal in caliber. Evaluation with lung windows demonstrates no clearly worrisome primary pulmonary  mass. Small scattered right lung nodules are seen on images 17, 28 and 37 which  are not felt to be clearly indicative of pulmonary metastases. Specifically, the  nodular density seen on image 28 resides within the right major fissure, and the  nodular density seen on image 37 may represent a benign vascular structure. No  pleural effusion is seen. Lungs are expanded without evidence for pneumothorax. Multiple small lucencies are seen most evident and thoracic vertebrae which is  best appreciated in the left lower endplate of Z67 on axial image 51 concerning  for additional smaller lytic lesions. CT ABDOMEN:  Assessment of the abdomen is somewhat limited by patient breathing motion  artifact. No clearly demonstrated defined lesion is seen of the liver, spleen,  or pancreas. No contour deforming or enhancing mass lesions are seen of the  adrenal glands. The gallbladder has been removed. Assessment of the kidneys is  also limited by patient breathing motion artifact without a clearly demonstrated  contour deforming mass seen. The visualized loops of small bowel and colon are normal in caliber. Mild to  moderate diverticulosis is seen of the left colon most prominently involving the  proximal sigmoid colon. No free fluid and no free air is seen in the abdomen. No adenopathy is seen of the abdomen. The abdominal aorta demonstrates moderate  atherosclerotic calcification. Once again, multiple small lucencies are seen  suggesting additional small lytic lesions. CT PELVIS:  No abnormal pelvic fluid collections are present. No pelvic adenopathy is seen. The urinary bladder demonstrates minimal nondependent gas which is likely  iatrogenic.  However, the urinary bladder otherwise appears thick walled and  demonstrates mild stranding of adjacent pelvic fat which suggest acute  inflammation. No clearly demonstrated defined bladder wall mass is seen,  however. A clearly abnormal lytic lesion is seen involving the right iliac bone  on axial image 92 with associated masslike density measuring 4.3 cm x 3.7 cm in  size. Additional smaller lucencies are seen which could represent additional  tiny lytic lesions. Impression  1. No clear primary malignancy seen. Assessment for a subtle process is  somewhat limited by patient breathing motion particularly with imaging through  the upper and mid abdomen. 2. Multiple additional bony lesions suggested most evident in the right ilium  where a lytic lesion with associated soft tissue component is seen measuring 4.3  cm x 3.7 cm in size. This likely represents additional bony changes from the  patient's known lytic disease. No clear metastatic lesions are otherwise  suggested. Small right lung nodules are seen. Some of these demonstrate features  which favor benign nodules. In general, these are felt to be nonspecific and not  clearly indicative of pulmonary metastases although attention on follow-up  studies is recommended. 3. Inflamed appearance of the urinary bladder. Recommend correlation with  urinalysis, and clinical exam.      This report was made using voice transcription. Despite my best efforts to avoid  any, transcription errors may persist. If there is any question about the  accuracy of the report or need for clarification, then please call [phone], or text me through [email or web address] for clarification or correction. Most recent Echo  No results found for this visit on 05/28/21.       Most recent lipid panels  Lab Results   Component Value Date/Time    Cholesterol, total 157 10/29/2015 11:16 AM    HDL Cholesterol 50 10/29/2015 11:16 AM    LDL, calculated 90 10/29/2015 11:16 AM    VLDL, calculated 17 10/29/2015 11:16 AM    Triglyceride 87 10/29/2015 11:16 AM       Most recent Hgb A1C  Lab Results Component Value Date/Time    Hemoglobin A1c 6.8 (H) 10/29/2015 11:16 AM                 Diagnoses and all orders for this visit:    1. Multiple myeloma not having achieved remission ([facility] [address]    2. Acute encephalopathy    3. Hypercalcemia    Other orders  -     allopurinoL (ZYLOPRIM) tablet 300 mg  -     PICC INSERTION VASCULAR ACCESS TEAM; Standing  -     IP CONSULT TO NEUROLOGY; Standing  -     NURSING-MISCELLANEOUS:; Standing  -     ACTIVITY AS TOLERATED W/ASSIST; Standing  -     AMBULATE PATIENT; Standing  -     DIET REGULAR; Standing  -     ELEVATE HEAD OF BED; Standing  -     FULL CODE; Standing  -     INCENTIVE SPIROMETRY; Standing  -     INTAKE AND OUTPUT; Standing  -     NOTIFY PROVIDER: SPECIFY; Standing  -     NURSING-MISCELLANEOUS:; Standing  -     OUT OF BED IN CHAIR; Standing  -     STRAIGHT CATHETER (NURSING);  Standing  -     VITAL SIGNS; Standing  -     WEIGH PATIENT; Standing  -     acetaminophen (TYLENOL) tablet 650 mg  -     acetaminophen (TYLENOL) suppository 650 mg  -     bisacodyL (DULCOLAX) suppository 10 mg  -     enoxaparin (LOVENOX) injection 40 mg  -     famotidine (PEPCID) tablet 20 mg  -     magnesium hydroxide (MILK OF MAGNESIA) 400 mg/5 mL oral suspension 30 mL  -     MECHANICAL PROPHYLAXIS IS CONTRAINDICATED; Standing  -     ondansetron (ZOFRAN ODT) tablet 4 mg  -     ondansetron (ZOFRAN) injection 4 mg  -     sodium chloride (NS) flush 5-40 mL  -     sodium chloride (NS) flush 5-40 mL  -     AMMONIA; Standing  -     aspirin chewable tablet 81 mg  -     CALCIUM; Standing  -     CBC W/O DIFF; Standing  -     cefTRIAXone (ROCEPHIN) 1 g in 0.9% sodium chloride (MBP/ADV) 50 mL MBP  -     DIET NUTRITIONAL SUPPLEMENTS; Standing  -     dilTIAZem ER (CARDIZEM CD) capsule 240 mg  -     escitalopram oxalate (LEXAPRO) tablet 10 mg  -     SOLIS CATH, DISCONTINUE; Standing  -     SOLIS CATHETER, INSERTION; Standing  -     FOLLOW UP VISIT; Standing  -     hydrALAZINE (APRESOLINE) 20 mg/mL injection 20 mg  -     hydrALAZINE (APRESOLINE) tablet 10 mg  -     IP CONSULT TO NUTRITION SERVICES; Standing  -     lactated Ringers infusion  -     magnesium oxide (MAG-OX) tablet 400 mg  -     melatonin tablet 5 mg (Patient Supplied)  -     METABOLIC PANEL, BASIC; Standing  -     OLANZapine (ZyPREXA) tablet 5 mg  -     PLEASE READ & DOCUMENT PPD TEST IN 72 HRS; Standing  -     POC URINE DIPSTICK; Standing  -     potassium chloride (K-DUR, KLOR-CON) SR tablet 40 mEq  -     prasugreL (EFFIENT) tablet 10 mg  -     pravastatin (PRAVACHOL) tablet 40 mg  -     STRAIGHT CATHETER (NURSING); Standing  -     BETA-2 MICROGLOBULIN; Standing  -     LORazepam (ATIVAN) tablet 0.5 mg  -     CBC W/O DIFF; Standing  -     LD; Standing  -     URIC ACID; Standing  -     EEG; Standing  -     folic acid (FOLVITE) 1 mg, thiamine (B-1) 100 mg in 0.9% sodium chloride 50 mL ivpb    The patient clearly has an acute encephalopathy and while she had initial hypercalcemia would agree with Dr. Gordon Irizarry that her calcium is normal and it is difficult to pin to her abnormal mental status as an effect of that metabolic derangement  She has not been eating particularly well and will therefore give her IV thiamine    Will obtain an EEG.   I would also repeat her MRI with IV contrast additionally will check ammonia            Gaston Pabon MD

## 2021-05-29 NOTE — PROGRESS NOTES
Patient's IV infiltrated. We were not able to get an IV placed. 3 attempts, including myself, charge nurse and rover from ICU. NP, Hermelinda Mercedes notified. PICC hopefully placed tomorrow.

## 2021-05-29 NOTE — PROGRESS NOTES
SW reviewed patient's chart and conducted a baseline assessment. Discharge plan at this time is as follows:     Care Management Interventions  PCP Verified by CM: Yes  Mode of Transport at Discharge: BLS  Transition of Care Consult (CM Consult): Discharge Planning, SNF  Physical Therapy Consult: Yes  Occupational Therapy Consult: Yes  Current Support Network: Own Home, Family Lives Nearby  Confirm Follow Up Transport: Family  Discharge Location  Discharge Placement: Skilled nursing facility      *Please note that discharge plans can change throughout an inpatient admission.  Ensure that you are referring to the most recent social work/nurse case management note for current discharge plan*     Daylin Zepeda, [address] [place] [address] Rd Work   [facility]    * [email or web address]

## 2021-05-29 NOTE — PROCEDURES
Electroencephalogram this study is performed on a multichannel digital EEG device utilizing dermal electrodes. The International 10-20 electrode recording system is employed. Medications are as per chart review    There is a poorly reactive background rhythm of 3 to 4 Hz. No evidence of spike or spike slow wave discharges are identified. There are occasional triphasic waves.     No localized area of electrocortical dysfunction    Impression    Abnormal EKG  There is evidence of a generalized encephalopathy without a focal component there is no evidence to suggest seizure activity    There are occasional triphasic waves which are consistent with an underlying metabolic origin

## 2021-05-30 NOTE — PROGRESS NOTES
Inpatient Hematology / Oncology Progress Note      Admission Date: 2021  5:40 PM  Reason for Admission/Hospital Course: Multiple myeloma ([facility] [address] [C90.00]      24 Hour Events:  A&O x 3  Son/daughter in law at bedside   Confusion much improved  Unsuccessful PICC placement, peripheral IV present  Rocephin for UTI       ROS:  Constitutional: Positive for fatigue and weakness negative for fever, chills, malaise. CV: Negative for chest pain, palpitations, edema. Respiratory: Negative for cough, dyspnea, cough, wheezing. GI: Negative for nausea, abdominal pain, diarrhea. 10 point review of systems is otherwise negative with the exception of the elements mentioned above in the HPI. Allergies   Allergen Reactions    Pcn [Penicillins] Rash       OBJECTIVE:  Patient Vitals for the past 8 hrs:   BP Temp Pulse Resp SpO2   21 0723 (!) 148/69 98.9 °F (37.2 °C) 82 16 97 %   21 0242 126/64 99 °F (37.2 °C) 86 16 100 %     Temp (24hrs), Av.5 °F (36.9 °C), Min:97.8 °F (36.6 °C), Max:99 °F (37.2 °C)    No intake/output data recorded. Physical Exam:  Constitutional: Elderly female in no acute distress, sitting comfortably in the hospital bed. HEENT: Normocephalic and atraumatic. Neck supple without JVD. No thyromegaly present. Skin Warm and dry. No bruising and no rash noted. No erythema. No pallor. Respiratory Lungs are clear to auscultation bilaterally without wheezes, rales or rhonchi, normal air exchange without accessory muscle use. CVS Normal rate, regular rhythm and normal S1 and S2. No murmurs, gallops, or rubs. Abdomen Soft, nontender and nondistended, normoactive bowel sounds. No palpable mass. Neuro Alert and oriented x 3, no focal deficits   MSK  +BLE edema and no tenderness.    Psych Appropriate mood        Labs:      Recent Labs     21  0421 21  0603 21  0619   WBC 5.5 6.5 5.7   RBC 2.84* 3.17* 3.10*   HGB 9.0* 9.9* 9.8*   HCT 25.6* 28.4* 28.1* MCV 90.1 89.6 90.6   MCH 31.7 31.2 31.6   MCHC 35.2* 34.9 34.9   RDW 14.5 14.1 13.6   * 124* 100*        Recent Labs     05/30/21  0421 05/29/21  0603 05/28/21  0620    143 143   K 3.5 3.2* 2.9*   * 111* 109*   CO2 24 22 24   AGAP 8 10 10   * 152* 104*   BUN 17 16 15   CREA 1.65* 1.67* 1.54*   GFRAA 38* 38* 42*   GFRNA 32* 31* 34*   CA 8.3 8.7 8.4   *  --   --    TP 5.8*  --   --    ALB 2.8*  --   --    GLOB 3.0  --   --    AGRAT 0.9*  --   --    MG  --   --  1.3*         Imaging:      ASSESSMENT:    Problem List  Date Reviewed: 6/15/2020        Codes Class Noted    Multiple myeloma (HCC) ICD-10-CM: C90.00  ICD-9-CM: 203.00  5/29/2021        Mild protein-calorie malnutrition (HCC) ICD-10-CM: E44.1  ICD-9-CM: 263.1  5/28/2021        Acute encephalopathy ICD-10-CM: G93.40  ICD-9-CM: 348.30  5/22/2021        Hypokalemia ICD-10-CM: E87.6  ICD-9-CM: 276.8  5/22/2021        Acute renal failure with tubular necrosis (HCC) ICD-10-CM: N17.0  ICD-9-CM: 584.5  5/22/2021        Hypercalcemia ICD-10-CM: E83.52  ICD-9-CM: 275.42  5/21/2021        Female stress incontinence ICD-10-CM: N39.3  ICD-9-CM: 625.6  6/23/2015        Cystocele, midline ICD-10-CM: N81.11  ICD-9-CM: 618.01  6/23/2015        Urge incontinence ICD-10-CM: N39.41  ICD-9-CM: 788.31  5/28/2014        Coronary atherosclerosis of native coronary artery ICD-10-CM: I25.10  ICD-9-CM: 414.01  2/16/2010        Stable angina (HCC) ICD-10-CM: I20.8  ICD-9-CM: 413.9  2/16/2010        Diabetes mellitus type II, [facility] ([facility] ICD-10-CM: E11.9  ICD-9-CM: 250.00  2/16/2010        Dyslipidemia ICD-10-CM: E78.5  ICD-9-CM: 272.4  2/16/2010        Essential hypertension, benign ICD-10-CM: I10  ICD-9-CM: 401.1  2/16/2010                PLAN:  Multiple Myeloma, hypo-secretory  - Transfer to [address] to initiate chemotherapy, likely CyBorD, given symptomatic hypercalcemia  - Check B2 microglobulin, uric acid and LDH  - Start allopurinol  - Give rasburicase if indicated  - Place PICC  5/29 awaiting PICC placement, uric acid 3.0-no need for rasburicase,   5/30 PICC placement unsuccessful, peripheral line placed, will proceed with CyBorD today, will need port placed in the future     Hypercalcemia  - PTH low, multiple lytic lesions on imaging  - Ca improved to 8.4 s/p Zometa, calcitonin, IVF  - Monitor  5/29 ca+ 8.7  5/30 CCa+ 9.26     Altered mental status  - MRI negative for intracranial abnormality  - Present on admission with hypercalcemia, which has resolved  - Ammonia slightly elevated at 43, but no hepatic dysfunction - hospitalist adding lactulose prior to transfer  - Will need neuro consult tomorrow  5/29 awaiting neurology, ammonia 17  5/30 much improved this AM, unable to obtain MRI yesterday due to pt movement, EEG with generalized encephalopathy of metabolic origin, neurology gave IV thiamine     VIET  - Nephrology followed  - Renal function improved  - Outpatient f/u  5/29 Cr 1.67  5/30 Cr 1.65     ?UTI  - UA suggestive of UTI  - UCx NGTD - follow-up culture  - Continues on CTX  5/29-awaiting PICC placement to resume CTX, UC + for gram negative rods  5/30 CTX has been resumed     CAD  - On ASA/Effient and pravastatin     Continue home meds  Glendy SOPs  VTE prophylaxis - Lovenox (hold plts <50k)          Heydi Haas NP  [facility] Hematology and Oncology  [address] [place] [address]  Office : [phone]  Fax : [phone]        Attending Addendum:  I have personally performed a face to face diagnostic evaluation on this patient.  I have reviewed and agree with the care plan as documented above by Milli Valles N.KINGSLEY.  My findings are as follows: Patient appears stable, heart rate regular without murmurs, abdomen is non-tender, bowel sounds are [de-identified] female reportedly good baseline performance status (lived alone and was independent in her ADLs up till a few weeks ago) history of CAD with stable angina, DM, hypertension, cholecystectomy, hysterectomy admitted with AMS, progressive clinical worsening after recent fall, and hypercalcemia.  In-house imaging consistent with lytic bony lesions.  SPEP with only small M spike at 0.28 g per DL IgA kappa band.  LC normal range.  24-hour UPEP without M spike.  Prelim BM biopsy results discussed with hematopathology and are consistent with hypercellular marrow at 90% packed with plasma cells.  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  Diagnosis, prognosis and management options discussed with family members including son and daughter-in-law who are at bedside.  Her hypercalcemia has since been treated and normalized though her confusion persists.  Recent brain MRI did not reveal any acute abnormality.  Her lingering confusion is likely metabolic encephalopathy related in combination with sundowning.  Family in agreement to proceed with myeloma directed therapy, and understand higher risks associated with this given worsened performance status.  [facility] with plans for inpatient myeloma directed therapy with likely dose reduced CyBorD regimen.  Uric acid normal.  started Allopurinol. Neurology on board. More alert today. Will consider therapy initiation, dose reduced, once line established.   Possible UTI, on ceftriaxone.   Supportive care as above.                Chau Polk MD  [facility] Hematology/Oncology  [id number] [place] [address]  Office : [phone]  Fax : [phone]

## 2021-05-30 NOTE — PROGRESS NOTES
Problem: Pressure Injury - Risk of  Goal: *Prevention of pressure injury  Description: Document Isaac Scale and appropriate interventions in the flowsheet.   Outcome: Progressing Towards Goal  Note: Pressure Injury Interventions:  Sensory Interventions: Assess changes in LOC    Moisture Interventions: Absorbent underpads    Activity Interventions: Pressure redistribution bed/mattress(bed type)    Mobility Interventions: Pressure redistribution bed/mattress (bed type)    Nutrition Interventions: Document food/fluid/supplement intake    Friction and Shear Interventions: HOB 30 degrees or less

## 2021-05-31 NOTE — PROGRESS NOTES
Problem: Pressure Injury - Risk of  Goal: *Prevention of pressure injury  Description: Document Isaac Scale and appropriate interventions in the flowsheet. Outcome: Progressing Towards Goal  Note: Pressure Injury Interventions:  Sensory Interventions: Assess changes in LOC    Moisture Interventions: Limit adult briefs, Internal/External urinary devices    Activity Interventions: Pressure redistribution bed/mattress(bed type), PT/OT evaluation    Mobility Interventions: HOB 30 degrees or less, Pressure redistribution bed/mattress (bed type)    Nutrition Interventions: Document food/fluid/supplement intake    Friction and Shear Interventions: HOB 30 degrees or less                Problem: Patient Education: Go to Patient Education Activity  Goal: Patient/Family Education  Outcome: Progressing Towards Goal     Problem: Falls - Risk of  Goal: *Absence of Falls  Description: Document Princess Fall Risk and appropriate interventions in the flowsheet.   Outcome: Progressing Towards Goal  Note: Fall Risk Interventions:  Mobility Interventions: Communicate number of staff needed for ambulation/transfer, Patient to call before getting OOB    Mentation Interventions: Door open when patient unattended, Room close to nurse's station, More frequent rounding    Medication Interventions: Teach patient to arise slowly, Patient to call before getting OOB    Elimination Interventions: Call light in reach, Patient to call for help with toileting needs    History of Falls Interventions: Room close to nurse's station, Door open when patient unattended         Problem: Patient Education: Go to Patient Education Activity  Goal: Patient/Family Education  Outcome: Progressing Towards Goal

## 2021-05-31 NOTE — PROGRESS NOTES
Inpatient Hematology / Oncology Progress Note      Admission Date: 2021  5:40 PM  Reason for Admission/Hospital Course: Multiple myeloma ([facility] [address] [C90.00]      24 Hour Events:  Confusion much worse  Son at bedside   Unable to start treatment  Did not sleep, pulling at lines, aggressive behavior with staff       ROS:  Unable to obtain due to AMS    10 point review of systems is otherwise negative with the exception of the elements mentioned above in the HPI. Allergies   Allergen Reactions    Pcn [Penicillins] Rash       OBJECTIVE:  Patient Vitals for the past 8 hrs:   BP Temp Pulse Resp SpO2   21 0720 (!) 157/72 98.7 °F (37.1 °C) 98 16 94 %   21 0300 (!) 126/98 98.2 °F (36.8 °C) 94 16 99 %     Temp (24hrs), Av.3 °F (36.8 °C), Min:97.2 °F (36.2 °C), Max:99.9 °F (37.7 °C)     0701 -  1900  In: 240 [P.O.:240]  Out: 400 [Urine:400]    Physical Exam:  Constitutional: Elderly female in no acute distress, sitting comfortably in the hospital bed. HEENT: Normocephalic and atraumatic. Neck supple without JVD. No thyromegaly present. Skin Warm and dry. No bruising and no rash noted. No erythema. No pallor. Respiratory Lungs are clear to auscultation bilaterally without wheezes, rales or rhonchi, normal air exchange without accessory muscle use. CVS Normal rate, regular rhythm and normal S1 and S2. No murmurs, gallops, or rubs. Abdomen Soft, nontender and nondistended, normoactive bowel sounds. No palpable mass. Neuro Alert but not oriented   MSK  +BLE edema and no tenderness.    Psych Pleasantly confused       Labs:      Recent Labs     21  0807 21  0421 21  0603   WBC 6.1 5.5 6.5   RBC 3.01* 2.84* 3.17*   HGB 9.4* 9.0* 9.9*   HCT 27.7* 25.6* 28.4*   MCV 92.0 90.1 89.6   MCH 31.2 31.7 31.2   MCHC 33.9 35.2* 34.9   RDW 14.3 14.5 14.1   * 115* 124*   GRANS 65  --   --    LYMPH 9*  --   --    MONOS 9  --   --    DF AUTOMATED  --   --    ANEU 5.1 --   --    ABL 0.5  --   --    ABM 0.5  --   --         Recent Labs     05/31/21  0807 05/30/21  0421 05/29/21  0603    144 143   K 4.0 3.5 3.2*   * 112* 111*   CO2 24 24 22   AGAP 7 8 10   * 121* 152*   BUN 16 17 16   CREA 1.82* 1.65* 1.67*   GFRAA 34* 38* 38*   GFRNA 28* 32* 31*   CA 8.1* 8.3 8.7   * 131*  --    TP 5.9* 5.8*  --    ALB 3.1* 2.8*  --    GLOB 2.8 3.0  --    AGRAT 1.1* 0.9*  --          Imaging:      ASSESSMENT:    Problem List  Date Reviewed: 6/15/2020        Codes Class Noted    Multiple myeloma (HCC) ICD-10-CM: C90.00  ICD-9-CM: 203.00  5/29/2021        Mild protein-calorie malnutrition (HCC) ICD-10-CM: E44.1  ICD-9-CM: 263.1  5/28/2021        Acute encephalopathy ICD-10-CM: G93.40  ICD-9-CM: 348.30  5/22/2021        Hypokalemia ICD-10-CM: E87.6  ICD-9-CM: 276.8  5/22/2021        Acute renal failure with tubular necrosis (HCC) ICD-10-CM: N17.0  ICD-9-CM: 584.5  5/22/2021        Hypercalcemia ICD-10-CM: E83.52  ICD-9-CM: 275.42  5/21/2021        Female stress incontinence ICD-10-CM: N39.3  ICD-9-CM: 625.6  6/23/2015        Cystocele, midline ICD-10-CM: N81.11  ICD-9-CM: 618.01  6/23/2015        Urge incontinence ICD-10-CM: N39.41  ICD-9-CM: 788.31  5/28/2014        Coronary atherosclerosis of native coronary artery ICD-10-CM: I25.10  ICD-9-CM: 414.01  2/16/2010        Stable angina (HCC) ICD-10-CM: I20.8  ICD-9-CM: 413.9  2/16/2010        Diabetes [facility] II[facility] ICD-10-CM: E11.9  ICD-9-CM: 250.00  2/16/2010        Dyslipidemia ICD-10-CM: E78.5  ICD-9-CM: 272.4  2/16/2010        Essential hypertension, benign ICD-10-CM: I10  ICD-9-CM: 401.1  2/16/2010                PLAN:  Multiple Myeloma, hypo-secretory  - Transfer to [address] to initiate chemotherapy, likely CyBorD, given symptomatic hypercalcemia  - Check B2 microglobulin, uric acid and LDH  - Start allopurinol  - Give rasburicase if indicated  - Place PICC  5/29 awaiting PICC placement, uric acid 3.0-no need for rasburicase,   5/30 PICC placement unsuccessful, peripheral line placed, will proceed with CyBorD today, will need port placed in the future  5/31 At this time, her AMS is the bigger issue, if AMS resolves will need port placed in order to start treatment-son does not want to start treatment at this time either      Hypercalcemia  - PTH low, multiple lytic lesions on imaging  - Ca improved to 8.4 s/p Zometa, calcitonin, IVF  - Monitor  5/29 ca+ 8.7  5/30 CCa+ 9.26  resolved     Altered mental status  - MRI negative for intracranial abnormality  - Present on admission with hypercalcemia, which has resolved  - Ammonia slightly elevated at 43, but no hepatic dysfunction - hospitalist adding lactulose prior to transfer  - Will need neuro consult tomorrow  5/29 awaiting neurology, ammonia 17  5/30 much improved this AM, unable to obtain MRI yesterday due to pt movement, EEG with generalized encephalopathy of metabolic origin, neurology gave IV thiamine  5/31 Unfortunately confusion is much worse overnight and today, did not sleep and had aggressive behavior at times with nursing staff, pulling at lines, not oriented, appreciate any assistance from neurology, will consult tele-psych     VIET  - Nephrology followed  - Renal function improved  - Outpatient f/u  5/29 Cr 1.67  5/30 Cr 1.65  5/31 Cr 1.82     ?UTI  - UA suggestive of UTI  - UCx NGTD - follow-up culture  - Continues on CTX  5/29-awaiting PICC placement to resume CTX, UC + for gram negative rods  5/30 CTX has been resumed  5/31 continue EOT 6/3     CAD  - On ASA/Effient and pravastatin     Continue home meds  Glendy SOPs  VTE prophylaxis - Lovenox (hold plts <50k)          Emelda Dakins, NP  [facility] Hematology and Oncology  [facility], [address] [place] [address]  Office : [phone]  Fax : [phone]          Attending Addendum:  I have personally performed a face to face diagnostic evaluation on this patient.  I have reviewed and agree with the care plan as documented above by Milli Valles N.P.  My findings are as follows: Patient appears stable, heart rate regular without murmurs, abdomen is non-tender, bowel sounds are [de-identified] female reportedly good baseline performance status (lived alone and was independent in her ADLs up till a few weeks ago) history of CAD with stable angina, DM, hypertension, cholecystectomy, hysterectomy admitted with AMS, progressive clinical worsening after recent fall, and hypercalcemia.  In-house imaging consistent with lytic bony lesions.  SPEP with only small M spike at 0.28 g per DL IgA kappa band.  LC normal range.  24-hour UPEP without M spike.  Prelim BM biopsy results discussed with hematopathology and are consistent with hypercellular marrow at 90% packed with plasma cells.  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  Diagnosis, prognosis and management options discussed with family members including son and daughter-in-law who are at bedside.  Her hypercalcemia has since been treated and normalized though her confusion persists.  Recent brain MRI did not reveal any acute abnormality.  Her lingering confusion is likely metabolic encephalopathy related in combination with sundowning.  Family in agreement to proceed with myeloma directed therapy, and understand higher risks associated with this given worsened performance status.  Admitted to [facility] with plans for inpatient myeloma directed therapy with likely dose reduced CyBorD regimen.  Uric acid normal.  started Allopurinol. Neurology on board. Confusion persists, trying ativan and haldol prn, will also get psychiatry involved. Will consider therapy initiation, dose reduced, once line established and confusion improved. Son in agreement. .  Possible UTI, treated with ceftriaxone.   Supportive care as above.                Georganna Snellen, MD  [facility] Hematology/Oncology  [address] [id number]  Office : [phone]  Fax : [phone]

## 2021-05-31 NOTE — PROGRESS NOTES
END OF SHIFT NOTE:    Intake/Output  05/30 1901 - 05/31 0700  In: -   Out: 650 [Urine:650]   Voiding: YES  Catheter: YES  Drain:   External Urinary Catheter 05/29/21 (Active)   Site Assessment Clean, dry, & intact 05/31/21 0217   Repositioned No 05/31/21 0217   Perineal Care No 05/31/21 0217   Wick Changed No 05/31/21 0217   Suction Canister/Tubing Changed No 05/30/21 1917   Urine Output (mL) 650 ml 05/31/21 0120               Stool:  0 occurrences. Stool Assessment  Stool Appearance: Loose (per patient's son) (05/28/21 2010)    Emesis:  0 occurrences. VITAL SIGNS  Patient Vitals for the past 12 hrs:   Temp Pulse Resp BP SpO2   05/31/21 0300 98.2 °F (36.8 °C) 94 16 (!) 126/98 99 %   05/30/21 2252 99.9 °F (37.7 °C) 87 16 121/65 98 %   05/30/21 1954 97.2 °F (36.2 °C) 94 15 106/82 98 %       Pain Assessment  Pain 1  Pain Scale 1: Numeric (0 - 10) (05/31/21 0217)  Pain Intensity 1: 0 (05/31/21 0217)  Patient Stated Pain Goal: 0 (05/31/21 0217)  Pain Reassessment 1: Patient resting w/respiratory rate greater than 10 (05/30/21 0203)    Ambulating  No    Additional Information: Pt awake entire night, did not sleep at all. Severely confused. Pulling at lines, pure wick and trying to get out of bed. Disoriented x3. Unable to follow any commands. Sentences are incomprehensible. Shift report given to oncoming nurse at the bedside.     Elena Barrios RN

## 2021-06-01 NOTE — PROGRESS NOTES
Problem: Pressure Injury - Risk of  Goal: *Prevention of pressure injury  Description: Document Isaac Scale and appropriate interventions in the flowsheet. Outcome: Progressing Towards Goal  Note: Pressure Injury Interventions:  Sensory Interventions: Assess changes in LOC, Avoid rigorous massage over bony prominences, Assess need for specialty bed, Turn and reposition approx. every two hours (pillows and wedges if needed)    Moisture Interventions: Absorbent underpads, Apply protective barrier, creams and emollients, Check for incontinence Q2 hours and as needed    Activity Interventions: Assess need for specialty bed, PT/OT evaluation, Pressure redistribution bed/mattress(bed type)    Mobility Interventions: Assess need for specialty bed, Chair cushion, Pressure redistribution bed/mattress (bed type), PT/OT evaluation    Nutrition Interventions: Document food/fluid/supplement intake    Friction and Shear Interventions: HOB 30 degrees or less, Transferring/repositioning devices                Problem: Falls - Risk of  Goal: *Absence of Falls  Description: Document Princess Fall Risk and appropriate interventions in the flowsheet.   Outcome: Progressing Towards Goal  Note: Fall Risk Interventions:  Mobility Interventions: Assess mobility with egress test, Bed/chair exit alarm, Patient to call before getting OOB    Mentation Interventions: Adequate sleep, hydration, pain control, Bed/chair exit alarm, Reorient patient, Family/sitter at bedside    Medication Interventions: Evaluate medications/consider consulting pharmacy, Assess postural VS orthostatic hypotension, Patient to call before getting OOB    Elimination Interventions: Bed/chair exit alarm, Call light in reach, Patient to call for help with toileting needs, Toilet paper/wipes in reach    History of Falls Interventions: Door open when patient unattended, Room close to nurse's station, Bed/chair exit alarm

## 2021-06-01 NOTE — PROGRESS NOTES
Son that stays with pt at night requests staff not wake pt when she is finally calm and resting. Request staff check her vitals and give meds while pt is already awake. Pt remains pleasantly confused. 3696 AM labs to be drawn later this morning since pt is sleeping.

## 2021-06-01 NOTE — PROGRESS NOTES
END OF SHIFT NOTE:    Intake/Output  06/01 0701 - 06/01 1900  In: 472 [P.O.:472]  Out: 300 [Urine:300]   Voiding: Yes \"PureWick\"  Catheter: NO  Drain:   External Urinary Catheter 05/29/21 (Active)   Site Assessment Clean, dry, & intact 06/01/21 1453   Repositioned Yes 06/01/21 1453   Perineal Care Yes 06/01/21 1453   Wick Changed No 06/01/21 1453   Suction Canister/Tubing Changed No 06/01/21 1453   Urine Output (mL) 300 ml 06/01/21 1747           Stool:  0 occurrences. Stool Assessment  Stool Appearance: Loose (per patient's son) (05/28/21 2010)    Emesis:  0 occurrences. VITAL SIGNS  Patient Vitals for the past 12 hrs:   Temp Pulse Resp BP SpO2   06/01/21 1706 -- 74 -- 128/62 --   06/01/21 1453 98.7 °F (37.1 °C) (!) 117 20 (!) 154/74 100 %   06/01/21 1107 98.1 °F (36.7 °C) (!) 104 15 (!) 159/85 93 %       Pain Assessment  Pain 1  Pain Scale 1: Numeric (0 - 10) (06/01/21 1453)  Pain Intensity 1: 0 (06/01/21 1453)  Patient Stated Pain Goal: 0 (06/01/21 1453)  Pain Reassessment 1: Yes (06/01/21 1453)    Ambulating  No    Additional Information:   Unable to awake in morning -> hold all morning med  Consult palliative and hospise care  DNR code change  Shift report given to oncoming nurse at the bedside.     Christin Cadet RN

## 2021-06-01 NOTE — CONSULTS
Palliative Care    Patient: Óscar Jc MRN: [id number]  SSN: [id number]    YOB: 1940  Age: [de-identified] y.o. Sex: female       Date of Request: 6/1/2021  Date of Consult:  6/1/2021  Reason for Consult:  goals of care  Requesting Physician: Hamida Mccabe NP     Assessment/Plan:     Principal Diagnosis:    Altered Mental Status R41.82    Additional Diagnoses:   · Advance Care Planning Counseling Z71.89  · Agitation  R45.1  · Debility, Unspecified  R53.81  · Failure to Thrive  R62.7  · Frailty  R54  · Pain, back  M54.9  · Counseling, Encounter for Medical Advice  Z71.9  · Encounter for Palliative Care  Z51.5    Palliative Performance Scale (PPS):       Medical Decision Making:   Reviewed and summarized notes from admission to present   Discussed case with appropriate providers  Reviewed laboratory and x-ray data from admission to present     Pt sedated, appears comfortable at present. Pt's son, Cheryl Jenkins, at bedside. Introduced role of PC and reviewed events. Cheryl Jenkins has very good understanding of pt's condition, and that treatment for her MM is not an option at present. He voiced interest in hearing the differences between Hospice and PC. Counseled on PC and Hospice, and answered questions. Cheryl Jenkins is interested in hospice support for pt, but states he cannot care for her at home. He works full time, and is an only child. He requested evaluation for [address]- order placed. Discussed code status, and Cheryl Jenkins states pt is a DNR. Order placed. Pt is requiring Haldol for agitation, and intermittent pain medication for back pain, as well as very poor intake (she has not eaten since yesterday). Discussed with Vinita Massey.  Will follow loosely.      Will discuss findings with members of the interdisciplinary team.      Thank you for this referral.          .    Subjective:     History obtained from:  Family, Care Provider and Chart    Chief Complaint: Hypercalcemia, back pain  History of Present Illness:  Ms Jossue Rea is an [de-identified] yo female with PMH of CAD, DM, HTN, and arthritis, who presented to the ER from home with c/o back pain, poor oral intake, and increasing confusion for several days. Work up revealed hypercalcemia and acute renal failure. Pt was admitted for further management. CT scan of the head noted multiple lytic lesions in the calvarium, but otherwise normal brain structure. Further work up revealed Multiple myeloma. Pt was transferred to [facility], for chemotherapy. Pt's AMS has persisted, and the decision to hold treatment was made until her mental status improved. Pt continues to have confusion and agitation. Advance Directive: Yes       Code Status:  Full Code            Health Care Power of : Yes - Copy of [address] on file. Past Medical History:   Diagnosis Date    Arthritis     Coronary atherosclerosis of native coronary artery     Diabetes mellitus type II, controlled ([facility] 2/16/2010    Hypertension     Multiple myeloma ([facility] 5/29/2021    Stable angina (HCC)     Stable angina (HCC)       Past Surgical History:   Procedure Laterality Date    HX APPENDECTOMY      HX CHOLECYSTECTOMY      HX HEART CATHETERIZATION  2010    Pt. had a heart attack during this procedure     HX HYSTERECTOMY  1974    IA LAP,CHOLECYSTECTOMY  2000     Family History   Problem Relation Age of Onset    Cancer Mother     Cancer Father     Breast Cancer Neg Hx       Social History     Tobacco Use    Smoking status: Never Smoker    Smokeless tobacco: Never Used   Substance Use Topics    Alcohol use: No     Prior to Admission medications    Medication Sig Start Date End Date Taking? Authorizing Provider   Aspirin, Buffered 81 mg tab Take  by mouth. Provider, Historical   meloxicam (MOBIC) 15 mg tablet Take 15 mg by mouth daily. Provider, Historical   escitalopram oxalate (LEXAPRO) 10 mg tablet Take 10 mg by mouth daily.     Provider, Historical   Prasugrel (EFFIENT) 10 mg tablet Take 1 Tab by mouth daily. 2/18/10   Luzmaria Villa NP   nitroglycerin (NITROSTAT) 0.4 mg SL tablet 1 Tab by SubLINGual route every five (5) minutes as needed for Chest Pain. Patient not taking: Reported on 5/22/2021 2/18/10   Luzmaria Villa NP   diltiazem CD (CARDIZEM CD) 240 mg ER capsule Take 240 mg by mouth daily. Provider, Historical   pravastatin (PRAVACHOL) 40 mg tablet Take 40 mg by mouth daily. Provider, Historical   LISINOPRIL/HYDROCHLOROTHIAZIDE (ZESTORETIC PO) Take  by mouth. Provider, Historical       Allergies   Allergen Reactions    Pcn [Penicillins] Rash        Review of Systems:  Review of systems not obtained due to patient factors- AMS, sedated      Objective:     Visit Vitals  BP (!) 159/85 (BP 1 Location: Left upper arm, BP Patient Position: At rest)   Pulse (!) 104   Temp 98.1 °F (36.7 °C)   Resp 15   Ht 5' 4\" (1.626 m)   Wt 182 lb 11.2 oz (82.9 kg)   SpO2 93%   BMI 31.36 kg/m²        Physical Exam:    General:  Sedated. Debilitated and frail   Eyes:  Conjunctivae/corneas clear    Nose: Nares normal. Septum midline.    Neck: Supple, symmetrical, trachea midline   Lungs:   Clear to auscultation bilaterally, unlabored   Heart:  Regular rate and rhythm   Abdomen:   Soft, non-tender, non-distended   Extremities: Normal, atraumatic, no cyanosis or edema   Skin: Skin color, texture, turgor normal.    Neurologic: Sedated    Psych: Sedated       Assessment:     Hospital Problems  Date Reviewed: 6/15/2020        Codes Class Noted POA    [facility] ICD-10-CM: C90.00  ICD-9-CM: 203.00  5/29/2021 Unknown              Signed By: Bay Jj NP     June 1, 2021

## 2021-06-01 NOTE — PROGRESS NOTES
Inpatient Hematology / Oncology Progress Note      Admission Date: 2021  5:40 PM  Reason for Admission/Hospital Course: Multiple myeloma ([facility] [C90.00]      24 Hour Events:  Confusion persists   Son at bedside   VSS, mildly hypertensive       ROS:  Unable to obtain due to AMS    10 point review of systems is otherwise negative with the exception of the elements mentioned above in the HPI. Allergies   Allergen Reactions    Pcn [Penicillins] Rash       OBJECTIVE:  No data found. Temp (24hrs), Av.4 °F (36.9 °C), Min:98 °F (36.7 °C), Max:98.8 °F (37.1 °C)    No intake/output data recorded. Physical Exam:  Constitutional: Elderly female in no acute distress, lying comfortably in the hospital bed. HEENT: Normocephalic and atraumatic. Neck supple without JVD. No thyromegaly present. Skin Warm and dry. No bruising and no rash noted. No erythema. No pallor. Respiratory Lungs are clear to auscultation bilaterally without wheezes, rales or rhonchi, normal air exchange without accessory muscle use. CVS Normal rate, regular rhythm and normal S1 and S2. No murmurs, gallops, or rubs. Abdomen Soft, nontender and nondistended, normoactive bowel sounds. No palpable mass. Neuro Alert but not oriented   MSK  +BLE edema and no tenderness.    Psych Pleasantly confused       Labs:      Recent Labs     21  0807 21  0421   WBC 6.1 5.5   RBC 3.01* 2.84*   HGB 9.4* 9.0*   HCT 27.7* 25.6*   MCV 92.0 90.1   MCH 31.2 31.7   MCHC 33.9 35.2*   RDW 14.3 14.5   * 115*   GRANS 65  --    LYMPH 9*  --    MONOS 9  --    DF AUTOMATED  --    ANEU 5.1  --    ABL 0.5  --    ABM 0.5  --         Recent Labs     21  0807 21  0421    144   K 4.0 3.5   * 112*   CO2 24 24   AGAP 7 8   * 121*   BUN 16 17   CREA 1.82* 1.65*   GFRAA 34* 38*   GFRNA 28* 32*   CA 8.1* 8.3   * 131*   TP 5.9* 5.8*   ALB 3.1* 2.8*   GLOB 2.8 3.0   AGRAT 1.1* 0.9* Imaging:      ASSESSMENT:    Problem List  Date Reviewed: 6/15/2020        Codes Class Noted    Multiple myeloma ([facility] ICD-10-CM: C90.00  ICD-9-CM: 203.00  5/29/2021        Mild protein-calorie malnutrition ([facility] ICD-10-CM: E44.1  ICD-9-CM: 263.1  5/28/2021        Acute encephalopathy ICD-10-CM: G93.40  ICD-9-CM: 348.30  5/22/2021        Hypokalemia ICD-10-CM: E87.6  ICD-9-CM: 276.8  5/22/2021        Acute renal failure with tubular necrosis (HCC) ICD-10-CM: N17.0  ICD-9-CM: 584.5  5/22/2021        Hypercalcemia ICD-10-CM: E83.52  ICD-9-CM: 275.42  5/21/2021        Female stress incontinence ICD-10-CM: N39.3  ICD-9-CM: 625.6  6/23/2015        Cystocele, midline ICD-10-CM: N81.11  ICD-9-CM: 618.01  6/23/2015        Urge incontinence ICD-10-CM: N39.41  ICD-9-CM: 788.31  5/28/2014        Coronary atherosclerosis of native coronary artery ICD-10-CM: I25.10  ICD-9-CM: 414.01  2/16/2010        Stable angina (HCC) ICD-10-CM: I20.8  ICD-9-CM: 413.9  2/16/2010        Diabetes mellitus type II, controlled ([facility] ICD-10-CM: E11.9  ICD-9-CM: 250.00  2/16/2010        Dyslipidemia ICD-10-CM: E78.5  ICD-9-CM: 272.4  2/16/2010        Essential hypertension, benign ICD-10-CM: I10  ICD-9-CM: 401.1  2/16/2010                PLAN:  Multiple Myeloma, hypo-secretory  - Transfer to [address] to initiate chemotherapy, likely CyBorD, given symptomatic hypercalcemia  - Check B2 microglobulin, uric acid and LDH  - Start allopurinol  - Give rasburicase if indicated  - Place PICC  5/29 awaiting PICC placement, uric acid 3.0-no need for rasburicase,   5/30 PICC placement unsuccessful, peripheral line placed, will proceed with CyBorD today, will need port placed in the future  5/31 At this time, her AMS is the bigger issue, if AMS resolves will need port placed in order to start treatment-son does not want to start treatment at this time either   6/1 Okay to hold off on line placement for now given ongoing AMS.   Discussed possibility of treatment if condition improves.       Hypercalcemia  - PTH low, multiple lytic lesions on imaging  - Ca improved to 8.4 s/p Zometa, calcitonin, IVF  - Monitor  5/29 ca+ 8.7  5/30 CCa+ 9.26  resolved     Altered mental status  - MRI negative for intracranial abnormality  - Present on admission with hypercalcemia, which has resolved  - Ammonia slightly elevated at 43, but no hepatic dysfunction - hospitalist adding lactulose prior to transfer  - Will need neuro consult tomorrow  5/29 awaiting neurology, ammonia 17  5/30 much improved this AM, unable to obtain MRI yesterday due to pt movement, EEG with generalized encephalopathy of metabolic origin, neurology gave IV thiamine  5/31 Unfortunately confusion is much worse overnight and today, did not sleep and had aggressive behavior at times with nursing staff, pulling at lines, not oriented, appreciate any assistance from neurology, will consult tele-psych  6/1 Tele psych consult noted--likely will not be beneficial d/t pt condition at this time. Neuro recommended repeating MRI of the brain when pt able.        [facility]  - Nephrology followed  - Renal function improved  - Outpatient f/u  5/29 Cr 1.67  5/30 Cr 1.65  5/31 Cr 1.82  6/1 Labs pending      ?UTI  - UA suggestive of UTI  - UCx NGTD - follow-up culture  - Continues on CTX  5/29-awaiting PICC placement to resume CTX, UC + for gram negative rods  5/30 CTX has been resumed  5/31 continue EOT 6/3     CAD  - On ASA/Effient and pravastatin     Continue home meds  Glendy SOPs  VTE prophylaxis - Lovenox (hold plts <50k)    Plan of Care/Updates:  Son requested palliative care consult and consult placed for assistance with care decisions.             Sean Escobar, CECIL  [facility]  [address] [place] [address], [address] [place] [address]  Office : [phone]  Fax : [phone]

## 2021-06-02 NOTE — HOSPICE
[facility]-    I had a long talk with pt's son, Velma Espinoza, and pt's DIL, Ines. We discussed hospice services including hospice philosophy of care and the levels of hospice. Tim Brunner states that since the pt cannot \"undergo\" treatment he was hoping for the [facility]. Pt is having a good am and able to take meds whole with applesauce and not needing aggressive sx management. Dr. Eriberto Rodriguez states that pt is not general inpatient approved today. So I talked more with Tim Brunner about the option of going home with hospice and the various care team members that would be making home visits. The family state that they need to consider their options and I will f/u with Tim Brunner tomorrow and see if pt's condition has changed.     Thank you for this referral-    Lazarus Griffiths RN BSN  Hospice Liaison  [phone]

## 2021-06-02 NOTE — HOSPICE
[facility]-    I will follow up on the referral.    Thank you for this referral-    [address] Liaison  [phone]

## 2021-06-02 NOTE — PROGRESS NOTES
Consult to trigger [address] was not done yesterday. Alessio talked with Onc RNP this am and since family wanting [facility] [place] placed ref with [facility]. [facility] pt's son and have medical director review chart for appropriateness for [address] to follow up when decision made.    Mercedes Anton

## 2021-06-02 NOTE — PROGRESS NOTES
Inpatient Hematology / Oncology Progress Note      Admission Date: 2021  5:40 PM  Reason for Admission/Hospital Course: Multiple myeloma ([facility] [C90.00]      24 Hour Events:  Confusion persists--more awake this AM  Son at bedside   VSS, mildly hypertensive     ROS:  Limited, denies pain or nausea. 10 point review of systems is otherwise negative with the exception of the elements mentioned above in the HPI. Allergies   Allergen Reactions    Pcn [Penicillins] Rash       OBJECTIVE:  Patient Vitals for the past 8 hrs:   BP Temp Pulse Resp SpO2   21 1110 131/89 98.2 °F (36.8 °C) (!) 107 12 98 %   21 0752 139/76 97.7 °F (36.5 °C) 99 20 99 %   21 0602 (!) 144/78 98 °F (36.7 °C) (!) 110 18 98 %     Temp (24hrs), Av.1 °F (36.7 °C), Min:97.7 °F (36.5 °C), Max:98.7 °F (37.1 °C)     07 -  1900  In: 200 [P.O.:472]  Out: 250 [Urine:250]    Physical Exam:  Constitutional: Elderly female in no acute distress, lying comfortably in the hospital bed. HEENT: Normocephalic and atraumatic. Neck supple without JVD. No thyromegaly present. Skin Warm and dry. No bruising and no rash noted. No erythema. No pallor. Respiratory Lungs are clear to auscultation bilaterally without wheezes, rales or rhonchi, normal air exchange without accessory muscle use. CVS Normal rate, regular rhythm and normal S1 and S2. No murmurs, gallops, or rubs. Abdomen Soft, nontender and nondistended, normoactive bowel sounds. No palpable mass. Neuro Alert but not oriented   MSK  +BLE edema and no tenderness.    Psych Pleasantly confused       Labs:      Recent Labs     21  0737 21  0950 21  0807   WBC 7.3 7.1 6.1   RBC 2.97* 2.87* 3.01*   HGB 9.3* 9.0* 9.4*   HCT 28.1* 26.6* 27.7*   MCV 94.6 92.7 92.0   MCH 31.3 31.4 31.2   MCHC 33.1 33.8 33.9   RDW 15.0* 14.6 14.3    134* 129*   GRANS 60 72 65   LYMPH 22 10* 9*   MONOS 10 3 9   EOS 1 1  --    BASOS 1 1  --    IG 6* --   --    DF AUTOMATED MANUAL AUTOMATED   ANEU 4.4 6.0 5.1   ABL 1.6 0.7 0.5   ABM 0.7 0.2 0.5   PRINCESS 0.1 0.1  --    ABB 0.1 0.1  --    AIG 0.4  --   --         Recent Labs     06/02/21  0737 06/01/21  0950 05/31/21  0807    142 143   K 4.6 4.7 4.0   * 112* 112*   CO2 24 25 24   AGAP 5* 5* 7   * 217* 172*   BUN 33* 21 16   CREA 1.79* 1.75* 1.82*   GFRAA 35* 36* 34*   GFRNA 29* 30* 28*   CA 8.7 8.5 8.1*   * 147* 146*   TP 6.5 6.5 5.9*   ALB 3.1* 3.1* 3.1*   GLOB 3.4 3.4 2.8   AGRAT 0.9* 0.9* 1.1*         Imaging:  None     ASSESSMENT:    Problem List  Date Reviewed: 6/15/2020        Codes Class Noted    Multiple myeloma (HCC) ICD-10-CM: C90.00  ICD-9-CM: 203.00  5/29/2021        Mild protein-calorie malnutrition ([facility] ICD-10-CM: E44.1  ICD-9-CM: 263.1  5/28/2021        Acute encephalopathy ICD-10-CM: G93.40  ICD-9-CM: 348.30  5/22/2021        Hypokalemia ICD-10-CM: E87.6  ICD-9-CM: 276.8  5/22/2021        Acute renal failure with tubular necrosis (HCC) ICD-10-CM: N17.0  ICD-9-CM: 584.5  5/22/2021        Hypercalcemia ICD-10-CM: E83.52  ICD-9-CM: 275.42  5/21/2021        Female stress incontinence ICD-10-CM: N39.3  ICD-9-CM: 625.6  6/23/2015        Cystocele, midline ICD-10-CM: N81.11  ICD-9-CM: 618.01  6/23/2015        Urge incontinence ICD-10-CM: N39.41  ICD-9-CM: 788.31  5/28/2014        Coronary atherosclerosis of native coronary artery ICD-10-CM: I25.10  ICD-9-CM: 414.01  2/16/2010        Stable angina (HCC) ICD-10-CM: I20.8  ICD-9-CM: 413.9  2/16/2010        Diabetes mellitus type II, [facility] ([facility] ICD-10-CM: E11.9  ICD-9-CM: 250.00  2/16/2010        Dyslipidemia ICD-10-CM: E78.5  ICD-9-CM: 272.4  2/16/2010        Essential hypertension, benign ICD-10-CM: I10  ICD-9-CM: 401.1  2/16/2010                PLAN:  Multiple Myeloma, hypo-secretory  - Transfer [address] to initiate chemotherapy, likely CyBorD, given symptomatic hypercalcemia  - Check B2 microglobulin, uric acid and LDH  - Start allopurinol  - Give rasburicase if indicated  - Place PICC  5/29 awaiting PICC placement, uric acid 3.0-no need for rasburicase,   5/30 PICC placement unsuccessful, peripheral line placed, will proceed with CyBorD today, will need port placed in the future  5/31 At this time, her AMS is the bigger issue, if AMS resolves will need port placed in order to start treatment-son does not want to start treatment at this time either   6/1 Okay to hold off on line placement for now given ongoing AMS. Discussed possibility of treatment if condition improves. 6/2 PC evaluated pt yesterday and Hospice consult placed.       Hypercalcemia  - PTH low, multiple lytic lesions on imaging  - Ca improved to 8.4 s/p Zometa, calcitonin, IVF  - Monitor  5/29 ca+ 8.7  5/30 CCa+ 9.26  resolved     Altered mental status  - MRI negative for intracranial abnormality  - Present on admission with hypercalcemia, which has resolved  - Ammonia slightly elevated at 43, but no hepatic dysfunction - hospitalist adding lactulose prior to transfer  - Will need neuro consult tomorrow  5/29 awaiting neurology, ammonia 17  5/30 much improved this AM, unable to obtain MRI yesterday due to pt movement, EEG with generalized encephalopathy of metabolic origin, neurology gave IV thiamine  5/31 Unfortunately confusion is much worse overnight and today, did not sleep and had aggressive behavior at times with nursing staff, pulling at lines, not oriented, appreciate any assistance from neurology, will consult tele-psych  6/1 Tele psych consult noted--likely will not be beneficial d/t pt condition at this time.   Neuro recommended repeating MRI of the brain when pt able.        [facility]  - Nephrology followed  - Renal function improved  - Outpatient f/u  5/29 Cr 1.67  5/30 Cr 1.65  5/31 Cr 1.82  6/1 Labs pending   6/2 Cr stable at 1.79     ?UTI  - UA suggestive of UTI  - UCx NGTD - follow-up culture  - Continues on CTX  5/29-awaiting PICC placement to resume CTX, UC + for gram negative rods  5/30 CTX has been resumed  5/31 continue EOT 6/3     CAD  - On ASA/Effient and pravastatin     Continue home meds  Glendy SOPs  VTE prophylaxis - Lovenox (hold plts <50k)    Plan of Care/Updates:  Consult for Hospice placed (CM aware) and awaiting evaluation for [facility] evaluation.              Andrew Broussard NP  [facility]  [address] [place] [address]  Office : [phone]  Fax : [phone]

## 2021-06-02 NOTE — PROGRESS NOTES
Problem: Pressure Injury - Risk of  Goal: *Prevention of pressure injury  Description: Document Isaac Scale and appropriate interventions in the flowsheet. Outcome: Progressing Towards Goal  Note: Pressure Injury Interventions:  Sensory Interventions: Assess need for specialty bed, Maintain/enhance activity level, Minimize linen layers    Moisture Interventions: Absorbent underpads, Check for incontinence Q2 hours and as needed, Internal/External urinary devices    Activity Interventions: Assess need for specialty bed, Pressure redistribution bed/mattress(bed type)    Mobility Interventions: Assess need for specialty bed, HOB 30 degrees or less, Pressure redistribution bed/mattress (bed type)    Nutrition Interventions: Document food/fluid/supplement intake, Offer support with meals,snacks and hydration    Friction and Shear Interventions: HOB 30 degrees or less, Transferring/repositioning devices                Problem: Falls - Risk of  Goal: *Absence of Falls  Description: Document Princess Fall Risk and appropriate interventions in the flowsheet.   Outcome: Progressing Towards Goal  Note: Fall Risk Interventions:  Mobility Interventions: Communicate number of staff needed for ambulation/transfer, Patient to call before getting OOB    Mentation Interventions: Bed/chair exit alarm, Family/sitter at bedside, More frequent rounding    Medication Interventions: Bed/chair exit alarm, Patient to call before getting OOB, Teach patient to arise slowly    Elimination Interventions: Bed/chair exit alarm, Call light in reach    History of Falls Interventions: Bed/chair exit alarm, Door open when patient unattended, Room close to nurse's station

## 2021-06-02 NOTE — PROGRESS NOTES
Problem: Pressure Injury - Risk of  Goal: *Prevention of pressure injury  Description: Document Isaac Scale and appropriate interventions in the flowsheet. Outcome: Progressing Towards Goal  Note: Pressure Injury Interventions:  Sensory Interventions: Assess need for specialty bed, Maintain/enhance activity level, Minimize linen layers    Moisture Interventions: Absorbent underpads, Check for incontinence Q2 hours and as needed, Internal/External urinary devices    Activity Interventions: Assess need for specialty bed, Pressure redistribution bed/mattress(bed type)    Mobility Interventions: Assess need for specialty bed, HOB 30 degrees or less, Pressure redistribution bed/mattress (bed type)    Nutrition Interventions: Document food/fluid/supplement intake, Offer support with meals,snacks and hydration    Friction and Shear Interventions: HOB 30 degrees or less, Transferring/repositioning devices                Problem: Falls - Risk of  Goal: *Absence of Falls  Description: Document Princess Fall Risk and appropriate interventions in the flowsheet.   Outcome: Progressing Towards Goal  Note: Fall Risk Interventions:  Mobility Interventions: Communicate number of staff needed for ambulation/transfer, Patient to call before getting OOB    Mentation Interventions: Bed/chair exit alarm, Family/sitter at bedside, More frequent rounding    Medication Interventions: Bed/chair exit alarm, Patient to call before getting OOB, Teach patient to arise slowly    Elimination Interventions: Call light in reach, Bed/chair exit alarm    History of Falls Interventions: Bed/chair exit alarm, Door open when patient unattended, Room close to nurse's station         Problem: Delirium Treatment  Goal: *Level of consciousness restored to baseline  Outcome: Progressing Towards Goal  Goal: *Level of environmental perceptions restored to baseline  Outcome: Progressing Towards Goal  Goal: *Sensory perception restored to baseline  Outcome: Progressing Towards Goal  Goal: *Emotional stability restored to baseline  Outcome: Progressing Towards Goal  Goal: *Functional assessment restored to baseline  Outcome: Progressing Towards Goal  Goal: *Absence of falls  Outcome: Progressing Towards Goal  Goal: *Will remain free of delirium, CAM Score negative  Outcome: Progressing Towards Goal  Goal: *Cognitive status will be restored to baseline  Outcome: Progressing Towards Goal  Goal: Interventions  Outcome: Progressing Towards Goal

## 2021-06-02 NOTE — PROGRESS NOTES
END OF SHIFT NOTE:    Intake/Output  06/02 0701 - 06/02 1900  In: 326 [P.O.:472]  Out: 750 [Urine:750]   Voiding: YES (external catheter)  Catheter: NO  Drain:   External Urinary Catheter 05/29/21 (Active)   Site Assessment Clean, dry, & intact 06/02/21 1504   Repositioned No 06/02/21 1504   Perineal Care No 06/02/21 1504   Wick Changed No 06/02/21 1504   Suction Canister/Tubing Changed No 06/02/21 1504   Urine Output (mL) 500 ml 06/02/21 1545               Stool:  0 occurrences. Stool Assessment  Stool Appearance: Loose (per patient's son) (05/28/21 2010)    Emesis:  0 occurrences. VITAL SIGNS  Patient Vitals for the past 12 hrs:   Temp Pulse Resp BP SpO2   06/02/21 1504 98.1 °F (36.7 °C) 96 14 127/72 97 %   06/02/21 1110 98.2 °F (36.8 °C) (!) 107 12 131/89 98 %   06/02/21 0752 97.7 °F (36.5 °C) 99 20 139/76 99 %   06/02/21 0602 98 °F (36.7 °C) (!) 110 18 (!) 144/78 98 %       Pain Assessment  Pain 1  Pain Scale 1: Numeric (0 - 10) (06/02/21 1504)  Pain Intensity 1: 0 (06/02/21 1504)  Patient Stated Pain Goal: 0 (06/02/21 1504)  Pain Reassessment 1: Yes (06/02/21 1504)    Ambulating  No    Additional Information:   Evaluate by psychiatrist  Much more response then yesterday    Shift report given to oncoming nurse at the bedside.     Maggy Roblero RN

## 2021-06-02 NOTE — PROGRESS NOTES
END OF SHIFT NOTE:    Intake/Output  06/01 1901 - 06/02 0700  In: -   Out: 450 [Urine:450]   Voiding: YES  Catheter: NO  Drain:   External Urinary Catheter 05/29/21 (Active)   Site Assessment Clean, dry, & intact 06/02/21 0315   Repositioned No 06/02/21 0315   Perineal Care No 06/02/21 0315   Wick Changed No 06/02/21 0315   Suction Canister/Tubing Changed No 06/02/21 0315   Urine Output (mL) 450 ml 06/02/21 0602               Stool:  0 occurrences. Stool Assessment  Stool Appearance: Loose (per patient's son) (05/28/21 2010)    Emesis:  0 occurrences. VITAL SIGNS  Patient Vitals for the past 12 hrs:   Temp Pulse Resp BP SpO2   06/02/21 0602 98 °F (36.7 °C) (!) 110 18 (!) 144/78 98 %   06/01/21 2129 97.7 °F (36.5 °C) (!) 113 18 139/62 97 %       Pain Assessment  Pain 1  Pain Scale 1: Numeric (0 - 10) (06/01/21 2129)  Pain Intensity 1: 0 (06/01/21 2129)  Patient Stated Pain Goal: 0 (06/01/21 2129)  Pain Reassessment 1: Yes (06/01/21 1453)    Ambulating  No    Additional Information: Pt's family requested to let the pt sleep as much as possible. VSS. IV fluids disconnected due to repetitive beeping. Pt rested comfortably overnight. Shift report given to oncoming nurse at the bedside.     Gil Lunsford

## 2021-06-02 NOTE — PROGRESS NOTES
SOC Telemed called to notify RN that psych consult would be pushed to tomorrow 6/2/2021 between [id number].

## 2021-06-02 NOTE — DISCHARGE SUMMARY
[address] [place] [address] Hematology & Oncology: Inpatient Hematology / Oncology Discharge Summary Note    Patient ID:  Balwinder Warren  [id number]  50 y.o.  1940    Admit Date: 5/28/2021    Discharge Date: 6/3/2021    Admission Diagnoses: Multiple myeloma ([facility] [address] [C90.00]    Discharge Diagnoses:  Principal Diagnosis: <principal problem not specified>  Active Problems:    Multiple myeloma ([address] (5/29/2021)        Hospital Course:  Ms. Jazzmine Preston is an [de-identified]year old female admitted on 5/28/2021 (transferred from [facility]) with new diagnosis of multiple myeloma. Hematology/oncology asked to see MS. Jazzmine Preston for evaluation of hypercalcemia. She has a history of hypertensions with limited data in the [facility] system. A CMP from [facility] one year ago showed no hypercalcemia. She was seen in our ED after a fall in April and had a normal calcium at that time with a creatinine of 1.02. Subsequent to this fall, she has had some back pain, but also become progressively more listless and anorexic especially over the past four days. This became associated with some mild confusions prompting medical evaluation. She was found to be hypercalcemic and with worsening renal function and was admitted. She was taking HCTZ as an outpatient. CT scanning of her head to evaluate her encephalopathy was notable for normal brain but multiple lytic lesions in the calvarium and skeletal survey revealed lytic lesion in left femoral diaphysis.     She underwent SPEP with small M-spike of 0.28, IgA kappa band. FLC normal with normal ratio and UPEP unremarkable. She underwent BMBx that showed marrow cellularity of >90% diffusely infiltrated with plasma cells. She reportedly had good PS with very recent decline. She continued to have persistent confusion despite improvement in hypercalcemia. She was also diagnosed with a UTI and treated with Rocephin. She was evaluated by neurology and felt to be c/w metabolic encephalopathy.   She was not felt to be a good candidate for treatment in current state and PC was consulted. PC discussed with son and opted for Hospice services. She was accepted to the [facility] and will discharge there today. Consults:  IP CONSULT TO NEUROLOGY  IP CONSULT TO PSYCHIATRY  IP CONSULT TO PALLIATIVE CARE - PROVIDER    Pertinent Diagnostic Studies:   Labs:    Recent Labs     06/02/21  0737 06/01/21  0950   WBC 7.3 7.1   HGB 9.3* 9.0*    134*   ANEU 4.4 6.0      Recent Labs     06/02/21  0737 06/01/21  0950    142   K 4.6 4.7   * 112*   CO2 24 25   * 217*   BUN 33* 21   CREA 1.79* 1.75*   CA 8.7 8.5   * 147*   TP 6.5 6.5   ALB 3.1* 3.1*       Imaging:  CT CHEST, ABDOMEN AND PELVIS WITH INTRAVENOUS CONTRAST DATED 5/27/2021.     History: Lytic bone lesions. Evaluate for malignancy.      Comparison: None.      Technique:   Multiple contiguous helical CT images reconstructed at 5 mm were  obtained from the base of the neck to the ischial tuberosities following oral  and 100 cc Isovue-370 without acute complication. All CT scans performed at  this facility use one or all of the following: Automated exposure control,  adjustment of the mA and/or kVp according to patient's size, iterative  reconstruction.     Findings:  CT Chest:  The base of the neck is unremarkable in appearance. No axillary, mediastinal,  or hilar lymphadenopathy is seen. The thoracic aorta is normal in caliber.      Evaluation with lung windows demonstrates no clearly worrisome primary pulmonary  mass. Small scattered right lung nodules are seen on images 17, 28 and 37 which  are not felt to be clearly indicative of pulmonary metastases. Specifically, the  nodular density seen on image 28 resides within the right major fissure, and the  nodular density seen on image 37 may represent a benign vascular structure. No  pleural effusion is seen. Lungs are expanded without evidence for pneumothorax.    Multiple small lucencies are seen most evident and thoracic vertebrae which is  best appreciated in the left lower endplate of A22 on axial image 51 concerning  for additional smaller lytic lesions.     CT ABDOMEN:    Assessment of the abdomen is somewhat limited by patient breathing motion  artifact. No clearly demonstrated defined lesion is seen of the liver, spleen,  or pancreas. No contour deforming or enhancing mass lesions are seen of the  adrenal glands. The gallbladder has been removed. Assessment of the kidneys is  also limited by patient breathing motion artifact without a clearly demonstrated  contour deforming mass seen.       The visualized loops of small bowel and colon are normal in caliber. Mild to  moderate diverticulosis is seen of the left colon most prominently involving the  proximal sigmoid colon. No free fluid and no free air is seen in the abdomen. No adenopathy is seen of the abdomen. The abdominal aorta demonstrates moderate  atherosclerotic calcification. Once again, multiple small lucencies are seen  suggesting additional small lytic lesions.     CT PELVIS:  No abnormal pelvic fluid collections are present. No pelvic adenopathy is seen. The urinary bladder demonstrates minimal nondependent gas which is likely  iatrogenic. However, the urinary bladder otherwise appears thick walled and  demonstrates mild stranding of adjacent pelvic fat which suggest acute  inflammation. No clearly demonstrated defined bladder wall mass is seen,  however. A clearly abnormal lytic lesion is seen involving the right iliac bone  on axial image 92 with associated masslike density measuring 4.3 cm x 3.7 cm in  size. Additional smaller lucencies are seen which could represent additional  tiny lytic lesions.     IMPRESSION  1. No clear primary malignancy seen.  Assessment for a subtle process is  somewhat limited by patient breathing motion particularly with imaging through  the upper and mid abdomen.      2. Multiple additional bony lesions suggested most evident in the right ilium  where a lytic lesion with associated soft tissue component is seen measuring 4.3  cm x 3.7 cm in size. This likely represents additional bony changes from the  patient's known lytic disease. No clear metastatic lesions are otherwise  suggested. Small right lung nodules are seen. Some of these demonstrate features  which favor benign nodules. In general, these are felt to be nonspecific and not  clearly indicative of pulmonary metastases although attention on follow-up  studies is recommended.     3. Inflamed appearance of the urinary bladder. Recommend correlation with  urinalysis, and clinical exam.    EXAMINATION: BRAIN MRI 5/26/2021 5:44 PM     INDICATION: Altered mental status.     COMPARISON: CT head May 21, 2021     TECHNIQUE: Multiplanar multisequence MRI of the brain without intravenous  contrast.     FINDINGS:     No evidence of acute or recent infarct. No evidence of recent hemorrhage. Normal  size of ventricles and extra-axial spaces for age. Scattered white matter FLAIR  hyperintensities, mild. Normal vascular flow voids.      The calvarial and cervical spine marrow is diffusely heterogenous with numerous  lesions, similar to CT head May 21, 2021. Grossly normal orbital structures. Essentially clear paranasal sinuses and mastoid air cells. No abnormality of  extracranial soft tissues.      IMPRESSION  1. No acute intracranial abnormality  2. Redemonstrated diffuse heterogeneity and numerous lesions of the calvarial  and cervical marrow which can reflect diffuse metastatic disease, myeloma, or  lymphoma.     Current Discharge Medication List      START taking these medications    Details   LORazepam (ATIVAN) 1 mg tablet Take 1 Tablet by mouth nightly. Max Daily Amount: 1 mg. Qty: 30 Tablet, Refills: 0  Start date: 6/2/2021    Associated Diagnoses:  Altered mental status, unspecified altered mental status type      OLANZapine (ZyPREXA) 5 mg tablet Take 1 Tablet by mouth every evening. Qty: 30 Tablet, Refills: 0  Start date: 2021         CONTINUE these medications which have NOT CHANGED    Details   Aspirin, Buffered 81 mg tab Take  by mouth.      escitalopram oxalate (LEXAPRO) 10 mg tablet Take 10 mg by mouth daily. diltiazem CD (CARDIZEM CD) 240 mg ER capsule Take 240 mg by mouth daily. pravastatin (PRAVACHOL) 40 mg tablet Take 40 mg by mouth daily. STOP taking these medications       meloxicam (MOBIC) 15 mg tablet Comments:   Reason for Stopping:         Prasugrel (EFFIENT) 10 mg tablet Comments:   Reason for Stopping:         nitroglycerin (NITROSTAT) 0.4 mg SL tablet Comments:   Reason for Stopping:         LISINOPRIL/HYDROCHLOROTHIAZIDE (ZESTORETIC PO) Comments:   Reason for Stopping:               OBJECTIVE:  Patient Vitals for the past 8 hrs:   BP Temp Pulse Resp SpO2   21 1115 119/86 98.2 °F (36.8 °C) (!) 104 22 99 %   21 0727 (!) 149/76 99.1 °F (37.3 °C) (!) 112 14 100 %     Temp (24hrs), Av.6 °F (37 °C), Min:98.1 °F (36.7 °C), Max:99.1 °F (37.3 °C)     07 -  1900  In: 26 [P.O.:236]  Out: -     Physical Exam:  Constitutional: Elderly female lying comfortably in hospital bed. HEENT: Normocephalic and atraumatic. Oropharynx is clear, mucous membranes are moist.  Neck supple. Lymph node   Deferred. Skin Warm and dry. No bruising and no rash noted. No erythema. No pallor. Respiratory Lungs are clear to auscultation bilaterally without wheezes, rales or rhonchi, normal air exchange without accessory muscle use. CVS Normal rate, regular rhythm and normal S1 and S2. No murmurs, gallops, or rubs. Abdomen Soft, nontender and nondistended, normoactive bowel sounds. No palpable mass. No hepatosplenomegaly. Neuro Grossly nonfocal with no obvious sensory or motor deficits. MSK Normal range of motion in general.  +BLE edema    Psych Pleasantly confused.          ASSESSMENT:    Active Problems: Multiple myeloma ([facility] [address] (5/29/2021)      DISPOSITION:  Follow-up Appointments   Procedures    FOLLOW UP VISIT Appointment in: Two Weeks Please set up hosp FU with [facility] Nephrology upon hosp d/c with renal panel and cbc w/o diff prior to appointment. [phone] Thanks     Please set up hosp FU with [facility] Nephrology upon hosp d/c with renal panel and cbc w/o diff prior to appointment. [phone]  Thanks     Standing Status:   Standing     Number of Occurrences:   1     Order Specific Question:   Appointment in     Answer: Two Weeks     Over 30 minutes was spent in discharge planning and coordination of care.             Fabiola Arteaga NP  [facility] Hematology & Oncology  [address] [address]  Office : [phone]  Fax : [phone]

## 2021-06-03 PROBLEM — I95.9 HYPOTENSION: Status: ACTIVE | Noted: 2018-07-31

## 2021-06-03 PROBLEM — F41.1 GAD (GENERALIZED ANXIETY DISORDER): Status: ACTIVE | Noted: 2020-06-09

## 2021-06-03 PROBLEM — G89.3 CANCER ASSOCIATED PAIN: Status: ACTIVE | Noted: 2021-01-01

## 2021-06-03 PROBLEM — N18.30 CKD (CHRONIC KIDNEY DISEASE) STAGE 3, GFR 30-59 ML/MIN (HCC): Status: ACTIVE | Noted: 2019-01-22

## 2021-06-03 PROBLEM — I25.10 CORONARY ARTERY DISEASE INVOLVING NATIVE CORONARY ARTERY OF NATIVE HEART WITHOUT ANGINA PECTORIS: Status: ACTIVE | Noted: 2018-07-31

## 2021-06-03 PROBLEM — M17.9 OSTEOARTHRITIS OF KNEE: Status: ACTIVE | Noted: 2021-01-01

## 2021-06-03 NOTE — PROGRESS NOTES
Inpatient Hematology / Oncology Progress Note      Admission Date: 2021  5:40 PM  Reason for Admission/Hospital Course: Multiple myeloma ([facility] [address] [C90.00]      24 Hour Events:  Confusion persists--sleeping when seen   Family at bedside   VSS, mildly hypertensive     ROS:  BRIANNE d/t pt condition. 10 point review of systems is otherwise negative with the exception of the elements mentioned above in the HPI. Allergies   Allergen Reactions    Pcn [Penicillins] Rash       OBJECTIVE:  Patient Vitals for the past 8 hrs:   BP Temp Pulse Resp SpO2   21 0727 (!) 149/76 99.1 °F (37.3 °C) (!) 112 14 100 %     Temp (24hrs), Av.6 °F (37 °C), Min:98.1 °F (36.7 °C), Max:99.1 °F (37.3 °C)     0701 -  1900  In: 236 [P.O.:236]  Out: -     Physical Exam:  Constitutional: Elderly female in no acute distress, lying comfortably in the hospital bed. HEENT: Normocephalic and atraumatic. Neck supple without JVD. No thyromegaly present. Skin Warm and dry. No bruising and no rash noted. No erythema. No pallor. Respiratory Lungs are clear to auscultation bilaterally without wheezes, rales or rhonchi, normal air exchange without accessory muscle use. CVS Normal rate, regular rhythm and normal S1 and S2. No murmurs, gallops, or rubs. Abdomen Soft, nontender and nondistended, normoactive bowel sounds. No palpable mass. Neuro Alert but not oriented   MSK  +BLE edema and no tenderness.    Psych Pleasantly confused       Labs:      Recent Labs     21  0737 21  0950   WBC 7.3 7.1   RBC 2.97* 2.87*   HGB 9.3* 9.0*   HCT 28.1* 26.6*   MCV 94.6 92.7   MCH 31.3 31.4   MCHC 33.1 33.8   RDW 15.0* 14.6    134*   GRANS 60 72   LYMPH 22 10*   MONOS 10 3   EOS 1 1   BASOS 1 1   IG 6*  --    DF AUTOMATED MANUAL   ANEU 4.4 6.0   ABL 1.6 0.7   ABM 0.7 0.2   PRINCESS 0.1 0.1   ABB 0.1 0.1   AIG 0.4  --         Recent Labs     21  0737 21  0950    142   K 4.6 4.7   * 112* CO2 24 25   AGAP 5* 5*   * 217*   BUN 33* 21   CREA 1.79* 1.75*   GFRAA 35* 36*   GFRNA 29* 30*   CA 8.7 8.5   * 147*   TP 6.5 6.5   ALB 3.1* 3.1*   GLOB 3.4 3.4   AGRAT 0.9* 0.9*         Imaging:  None     ASSESSMENT:    Problem List  Date Reviewed: 6/15/2020        Codes Class Noted    Multiple myeloma (HCC) ICD-10-CM: C90.00  ICD-9-CM: 203.00  5/29/2021        Mild protein-calorie malnutrition ([facility] ICD-10-CM: E44.1  ICD-9-CM: 263.1  5/28/2021        Acute encephalopathy ICD-10-CM: G93.40  ICD-9-CM: 348.30  5/22/2021        Hypokalemia ICD-10-CM: E87.6  ICD-9-CM: 276.8  5/22/2021        Acute renal failure with tubular necrosis (HCC) ICD-10-CM: N17.0  ICD-9-CM: 584.5  5/22/2021        Hypercalcemia ICD-10-CM: E83.52  ICD-9-CM: 275.42  5/21/2021        Female stress incontinence ICD-10-CM: N39.3  ICD-9-CM: 625.6  6/23/2015        Cystocele, midline ICD-10-CM: N81.11  ICD-9-CM: 618.01  6/23/2015        Urge incontinence ICD-10-CM: N39.41  ICD-9-CM: 788.31  5/28/2014        Coronary atherosclerosis of native coronary artery ICD-10-CM: I25.10  ICD-9-CM: 414.01  2/16/2010        Stable angina (HCC) ICD-10-CM: I20.8  ICD-9-CM: 413.9  2/16/2010        Diabetes mellitus type II, controlled ([facility] ICD-10-CM: E11.9  ICD-9-CM: 250.00  2/16/2010        Dyslipidemia ICD-10-CM: E78.5  ICD-9-CM: 272.4  2/16/2010        Essential hypertension, benign ICD-10-CM: I10  ICD-9-CM: 401.1  2/16/2010                PLAN:  Multiple Myeloma, hypo-secretory  - Transfer to [place] to initiate chemotherapy, likely CyBorD, given symptomatic hypercalcemia  - Check B2 microglobulin, uric acid and LDH  - Start allopurinol  - Give rasburicase if indicated  - Place PICC  5/29 awaiting PICC placement, uric acid 3.0-no need for rasburicase,   5/30 PICC placement unsuccessful, peripheral line placed, will proceed with CyBorD today, will need port placed in the future  5/31 At this time, her AMS is the bigger issue, if AMS resolves will need port placed in order to start treatment-son does not want to start treatment at this time either   6/1 Okay to hold off on line placement for now given ongoing AMS. Discussed possibility of treatment if condition improves. 6/2 PC evaluated pt yesterday and Hospice consult placed.       Hypercalcemia  - PTH low, multiple lytic lesions on imaging  - Ca improved to 8.4 s/p Zometa, calcitonin, IVF  - Monitor  5/29 ca+ 8.7  5/30 CCa+ 9.26  resolved     Altered mental status  - MRI negative for intracranial abnormality  - Present on admission with hypercalcemia, which has resolved  - Ammonia slightly elevated at 43, but no hepatic dysfunction - hospitalist adding lactulose prior to transfer  - Will need neuro consult tomorrow  5/29 awaiting neurology, ammonia 17  5/30 much improved this AM, unable to obtain MRI yesterday due to pt movement, EEG with generalized encephalopathy of metabolic origin, neurology gave IV thiamine  5/31 Unfortunately confusion is much worse overnight and today, did not sleep and had aggressive behavior at times with nursing staff, pulling at lines, not oriented, appreciate any assistance from neurology, will consult tele-psych  6/1 Tele psych consult noted--likely will not be beneficial d/t pt condition at this time.   Neuro recommended repeating MRI of the brain when pt able.        VIET  - Nephrology followed  - Renal function improved  - Outpatient f/u  5/29 Cr 1.67  5/30 Cr 1.65  5/31 Cr 1.82  6/1 Labs pending   6/2 Cr stable at 1.79     ?UTI  - UA suggestive of UTI  - UCx NGTD - follow-up culture  - Continues on CTX  5/29-awaiting PICC placement to resume CTX, UC + for gram negative rods  5/30 CTX has been resumed  5/31 continue EOT 6/3  6/3 Rocephin completes today.       CAD  - On ASA/Effient and pravastatin     Continue home meds  Glendy SOPs  VTE prophylaxis - Lovenox (hold plts <50k)    Plan of Care/Updates:  Consult for Hospice placed (CM aware) and awaiting second evaluation for [address] [place] [address] with Hospice. Family updated at bedside today and discussed that continued confusion despite stabilizing renal function, resolving hypercalcemia, and treatment for UTI.              Rayshawn Dixon NP  [facility] Hematology and Oncology  [address]  Office : [phone]  Fax : [phone]

## 2021-06-03 NOTE — HOSPICE
[facility]-    I met with Ephraim Leahy and we talked about hospice services again. Dr. Irwin Morrison has approved pt for general inpatient care at [facility]. Transport set up for 1445/1500 to room 106. DNR and all hospice consents completed with Mina.     Thank you for this referral-    Madelin Prince RN BSN  Hospice Liaison  [phone]

## 2021-06-03 NOTE — PROGRESS NOTES
Problem: Pressure Injury - Risk of  Goal: *Prevention of pressure injury  Description: Document Isaac Scale and appropriate interventions in the flowsheet.   Outcome: Progressing Towards Goal  Note: Pressure Injury Interventions:  Sensory Interventions: Assess changes in LOC, Assess need for specialty bed, Keep linens dry and wrinkle-free, Maintain/enhance activity level, Minimize linen layers    Moisture Interventions: Absorbent underpads, Apply protective barrier, creams and emollients, Internal/External urinary devices, Check for incontinence Q2 hours and as needed    Activity Interventions: Assess need for specialty bed, Increase time out of bed, Pressure redistribution bed/mattress(bed type)    Mobility Interventions: Assess need for specialty bed, HOB 30 degrees or less, Pressure redistribution bed/mattress (bed type)    Nutrition Interventions: Document food/fluid/supplement intake, Offer support with meals,snacks and hydration    Friction and Shear Interventions: HOB 30 degrees or less, Transferring/repositioning devices, Sit at 90-degree angle, Apply protective barrier, creams and emollients

## 2021-06-03 NOTE — PROGRESS NOTES
Janneth Palumbo is an [de-identified]year old female admitted to room 106 for hospice diagnosis of multiple myeloma with mets to the bone. Level of care is general inpatient for management of pain, agitation and delirium. Pt is alert and able to answer simple yes and no questions. She is able to state her name and date of birth and present time. She denies pain and shortness of breath. Physical assessment completed. Lung sounds clear but diminished in bases, heart sounds regular, abdomen soft with extensive ecchymosis to lower quadrants, pt has active bowel sounds, pt has dressing to left thigh with oozing serosanguinous drainage, changed dressing and applied 4 x 4, and two abd dressings wrapped with kerlix. Extremities warm with palpable pulses. Sacrum and bilateral heels with blanchable redness. Report from hospital nurse indicates pt has had periods of agitation and confusion alternating with periods of somnolence. Explained procedure for placing cook. 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 Placed #16 cook catheter with over 500 cc output of clear yellow urine. Pt tolerated without moaning or complaint. 56 pt's son, Bon Williamson and daughter in law, Sridhar Atkinson at bedside. Discussed hospice, plan of care, family situation. They state they are on board with hospice, they want her to be comfortable. Bon Williamson states pt was living alone and caring for herself prior to a couple of falls which led to the diagnosis of multiple myeloma. They state she became very agitated and confused in the hospital. Bon Williamson states he has not left her side for days. Gave them the blue book oriented them to room and hospice. Asked them if they had any questions. Administered scheduled medication after explaining prn medication. 1749 pt resting quietly with snoring. Not grimacing, groaning or agitation. Held senna due to pt sleeping Report given to [facility], [place] RN

## 2021-06-03 NOTE — PROGRESS NOTES
Problem: Anxiety/Agitation Goal: Verbalize and demonstrate ability to manage anxiety Description: The patient/family/caregiver will verbalize and demonstrate ability to manage the patient's anxiety throughout hospice care. Outcome: Progressing Towards Goal 
Note: Pt would be relaxed as indicated by no moaning, groaning or attempting to get out of bed Haldol 2 mg IV/Sq q 12 hours scheduled Haldol 2 mg IV/SQ q 1 hour prn Ativan 1 mg po q 4 hours prn  
  
Problem: Hospice Orientation Goal: Demonstrate understanding of hospice philosophy, plan of care, and home hospice program 
Description: The patient/family/caregiver will demonstrate understanding of hospice philosophy, plan of care and the home hospice program as evidenced by participation in meeting the patient's psychosocial, spiritual, medical, and physical needs inclusive of medical supplies/equipment focusing on symptoms. 6/3/2021 1918 by Claritza Albert RN Outcome: Progressing Towards Goal 
Note: Pt's family would be able to state s/sx of physical deterioration Gave son, blue book and asked if they had any questions 6/3/2021 1911 by Claritza Albert RN Outcome: Progressing Towards Goal 
  
Problem: Pain Goal: Verbalize satisfaction of level of comfort and symptom control Note: Pt pain would be less then 4/10 Morphine 2 mg IV/SQ q 6 hours scheduled Morphine 2 mg IV/SQ q 20 minutes prn

## 2021-06-03 NOTE — PROGRESS NOTES
Care Management Interventions  PCP Verified by CM: Yes  Mode of Transport at Discharge: BLS  Transition of Care Consult (CM Consult): Hospice House, Discharge Planning  Physical Therapy Consult: Yes  Occupational Therapy Consult: Yes  Current Support Network: Own Home, Family Lives Nearby  Confirm Follow Up Transport: Family  Discharge Location  Discharge Placement: Other: ([facility])    Sw spoke with [id number] liaison who informed pt now meets GIP criteria for [facility]. She will arrange transport and Rn will call report. Family aware and very thankful.  Shauna Nunn

## 2021-06-03 NOTE — HSPC IDG NURSE NOTES
Patient: Radha Doshi Date: 06/03/21 Time: 7:44 PM 
 
[facility] Nurse Notes Tyrese Michael is an [de-identified]year old female admitted to room 106 for hospice diagnosis of multiple myeloma with mets to the bone. Level of care is general inpatient for management of pain, agitation and delirium. Pt is alert and able to answer simple yes and no questions. She is able to state her name and date of birth and present time. She denies pain and shortness of breath. Physical assessment completed. Lung sounds clear but diminished in bases, heart sounds regular, abdomen soft with extensive ecchymosis to lower quadrants, pt has active bowel sounds, pt has dressing to left thigh with oozing serosanguinous drainage, changed dressing and applied 4 x 4, and two abd dressings wrapped with kerlix. Extremities warm with palpable pulses. Sacrum and bilateral heels with blanchable redness. Report from hospital nurse indicates pt has had periods of agitation and confusion alternating with periods of somnolence. Explained procedure for placing cook. 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 Placed #16 cook catheter with over 500 cc output of clear yellow urine. Pt tolerated without moaning or complaint.  
  
1619 pt's son, Kayla Cortes and daughter in law, [place] at bedside. Discussed hospice, plan of care, family situation. They state they are on board with hospice, they want her to be comfortable. Kayla Cortes states pt was living alone and caring for herself prior to a couple of falls which led to the diagnosis of multiple myeloma. They state she became very agitated and confused in the hospital. Kayla Cortes states he has not left her side for days. Gave them the blue book oriented them to room and hospice. Asked them if they had any questions. Administered scheduled medication after explaining prn medication.   
1749 pt resting quietly with snoring. Not grimacing, groaning or agitation.  Held senna due to pt sleeping 
  
 
 
 
Signed by: Carlos Carmen RN

## 2021-06-03 NOTE — PROGRESS NOTES
Problem: Pressure Injury - Risk of  Goal: *Prevention of pressure injury  Description: Document Isaac Scale and appropriate interventions in the flowsheet. Outcome: Progressing Towards Goal  Note: Pressure Injury Interventions:  Sensory Interventions: Assess changes in LOC, Assess need for specialty bed, Avoid rigorous massage over bony prominences, Minimize linen layers, Sit a 90-degree angle/use footstool if needed, Turn and reposition approx. every two hours (pillows and wedges if needed)    Moisture Interventions: Absorbent underpads, Apply protective barrier, creams and emollients, Internal/External urinary devices, Maintain skin hydration (lotion/cream)    Activity Interventions: Assess need for specialty bed, Chair cushion, Pressure redistribution bed/mattress(bed type)    Mobility Interventions: Assess need for specialty bed, Chair cushion, Pressure redistribution bed/mattress (bed type), Turn and reposition approx. every two hours(pillow and wedges)    Nutrition Interventions: Document food/fluid/supplement intake    Friction and Shear Interventions: Apply protective barrier, creams and emollients, Foam dressings/transparent film/skin sealants, HOB 30 degrees or less, Transferring/repositioning devices                Problem: Patient Education: Go to Patient Education Activity  Goal: Patient/Family Education  Outcome: Progressing Towards Goal     Problem: Falls - Risk of  Goal: *Absence of Falls  Description: Document Princess Fall Risk and appropriate interventions in the flowsheet.   Outcome: Progressing Towards Goal  Note: Fall Risk Interventions:  Mobility Interventions: Assess mobility with egress test, Bed/chair exit alarm, Patient to call before getting OOB    Mentation Interventions: Adequate sleep, hydration, pain control, Bed/chair exit alarm, Evaluate medications/consider consulting pharmacy, Reorient patient    Medication Interventions: Assess postural VS orthostatic hypotension, Bed/chair exit alarm, Patient to call before getting OOB    Elimination Interventions: Bed/chair exit alarm, Call light in reach, Patient to call for help with toileting needs    History of Falls Interventions: Bed/chair exit alarm, Consult care management for discharge planning, Investigate reason for fall, Assess for delayed presentation/identification of injury for 48 hrs (comment for end date)         Problem: Delirium Treatment  Goal: *Level of consciousness restored to baseline  Outcome: Progressing Towards Goal  Goal: *Level of environmental perceptions restored to baseline  Outcome: Progressing Towards Goal  Goal: *Sensory perception restored to baseline  Outcome: Progressing Towards Goal  Goal: *Emotional stability restored to baseline  Outcome: Progressing Towards Goal

## 2021-06-04 NOTE — HSPC IDG CHAPLAIN NOTES
Patient: Simran Ibrahim Date: 06/04/21 Time: 11:42 AM 
 
[facility]  Notes Assessment pending for spiritual and bereavement care. Signed by: Brooklynn Cline

## 2021-06-04 NOTE — HSPC IDG VOLUNTEER NOTES
Patient: Capo Hogue Date: 06/04/21 Time: 11:40 AM 
 
[facility]s Initial Hospice House IDG Note: (to be used at first IDG on patient) Volunteer  Hospice Interdisciplinary Plan of Care Review Status Codes I = Initiated   NV= No visits per Infection Control Policy or family request 
  
I. Volunteer Goal: Hospice house volunteer (s) enhances the quality of remaining life while patient is at the hospice house. Interventions: Yazmin Henderson Volunteer (s) will provide companionship to the patient and/or family by visiting at the hospice house Yazmin Henderson Volunteer (s) will provide respite as needed when requested by patient and/or family. Yazmin May  Volunteer will provide activities such as music, reading, pet therapy, etc. as requested. Yazmin May  Comfort bag delivered. Any other special requests or information regarding volunteer services: 
 Staff have requested visits for companionship and volunteers have been notified by this Kezia 22. No further needs identified at this time.

## 2021-06-04 NOTE — PROGRESS NOTES
1315- Patient report taken from [facility], RN and walking rounds completed. The patient is GIP to manage symptoms of pain and dyspnea. The current plan of care is for the patient to remain in the [facility] with [id number] providing care until she meets her demise. Plan of care is assessed every shift. Patient is resting quietly in the bed with no signs distress. Pain is at 0/10 using nonverbal scale. No signs of anxiety, SOB or N/V observed. The bed is low and locked with two bed rails up and the tab alert in place. The patient's room is near to the nurses station and the call light is within her reach. 1040- The patient is awake and is alert and oriented to person, place and time. The patient is resting and alert to person only. She was given her scheduled AM medications. Patient took the senna crushed in yogurt with no problems. She was turned and repositioned in the bed. Pain is at 0/10 using nonverbal scale. No signs of anxiety, SOB or N/V observed. Patient talks but wonders off in her thoughts and talk. 1- Educated the patient's son, Yoel Sharp, on the medications that have been administered and why and he voiced understanding. The patient is awake and is interacting with family at the bedside. 1200- Patient ate a few bites of her lunch. Patient continues to show no signs of pain, SOB, nausea / vomit or anxiety. Family remains at the bedside. 1500- The patient continues to speak to family in the room. She shows no signs of pain, SOB, anxiety or N/V. She has been turned by the CNA's and repositioned. 1623- Medicated with scheduled Morphine and flushed line. Patient states that she is not in pain. She shows no signs of anxiety, SOB or nausea and vomit. Educated the family that is they thought the patient was in pain they should call. Educated that the pain medication could be administered every 20 minutes if needed. Educated that the 2 mg was a very small dose.  The family voiced understanding. 1756- Senna crushed and administered in yogurt. Patient took with no problems. Patient continues to deny pain. Reported off to Samuel Hamm RN.

## 2021-06-04 NOTE — HSPC IDG SOCIAL WORKER NOTES
JUSTA has read the initial comprehensive assessment and plan of care and acknowledges no urgent needs and is in agreement with plan JUSTA to assess coping and needs each visit and offer availability.

## 2021-06-04 NOTE — PROGRESS NOTES
1830:  Patient report from Geneva Reece RN. Patient ID by name and . Patient is here for [facility] LOC for hospice diagnosis of multiple myeloma with mets to the bone with symptoms to manage of pain and agitation. Patient currently visiting with family at this time. Denies pain or needs. No other symptoms to manage currently. FLACC 0/10. RN has reviewed plan of care with CNA. Patient will remain in [facility] under [facility] LOC until demise. Will continue to evaluate patient discharge plan for potential change of LOC. Bed low and locked and all safety measures in place. Family currently at bedside and door is closed. :  Family called to state patient had expressed pain in her right shoulder. Patient confirms pain to shoulder to nurse but unable to rate pain. FLACC 3/10. Medicated with PRN Morphine 2 mg IV for pain. Will reassess. :  Reassessment of pain. Patient now resting with eyes closed. No s/sx of pain observed. FLACC 0/10. Family leaving for the night. All safety measures continue. 0:  Scheduled Morphine 2 mg IV and Haldol 2 mg IV administered as ordered. Physical assessment complete and documented in flowsheets. Patient continues to rest with no s/sx of pain, dyspnea, or agitation observed. FLACC 0/10. All safety measures continue. 2330:  Patient resting with no s/sx of pain, dyspnea, or agitation observed. FLACC 0/10. All safety measures continue. 0200:  Patient resting with no s/sx of pain, dyspnea, or agitation observed. FLACC 0/10. All safety measures continue. 9264:  Scheduled Morphine 2 mg IV given as ordered. Patient repositioned in bed for comfort. 0600:  Patient resting with no s/sx of pain, dyspnea, or agitation observed. FLACC 0/10. All safety measures continue. Patient required one PRN dose of Morphine for pain this shift. Report to Geneva Reece RN.

## 2021-06-04 NOTE — HSPC IDG NURSE NOTES
Patient: Joel Valdez Date: 06/04/21 Time: 1:44 PM 
 
[facility] Nurse Notes 1st IDG: Pt is a [de-identified]year-old Female with myloma who is here GIP level of care for management of delirium. Schneider with UOP of 825ml IV access: PIV Right FA 
 PO intake: Bites and Sips Oxygen: Room AIr Wounds: Left thigh / Mid Spine surgical site PRN medications: Haldol 2mg BID x 2 doses for agitation / Morphine 2mg x 4 doses for pain Scheduled meds: Haldol 2mg Q 12 hours / Morphine 2mg Q 6 hours / Senna 8.6mg BID Plan: D/C scheduled Haldol. Comprehensive plan of care reviewed. IDG and pt./family in agreement with plan of care. The IDG identifies through on-going assessment when a change is needed to the POC; the pt/family will receive care and services necessitated by changes in POC. Medications reviewed by the pharmacist and Medical Director.  
 
 
 
Signed by: Cirilo Joshi RN

## 2021-06-04 NOTE — HSPC IDG CHAPLAIN NOTES
Patient: Luis Montero Date: 06/04/21 Time: 9:27 AM 
 
[facility]  Notes / Grief Counselor has reviewed  Initial Comprehensive Assessment and plan of care. Bereavement and Spiritual Care Assessments to be completed and plan of care put in place to meet the needs of patient and familly. Signed by: Meggan Solano

## 2021-06-04 NOTE — PROGRESS NOTES
Background: Slava Smion is an [de-identified]year old lady who comes to us from [facility]. She was  admitted to [facility]  on 6/3/2021. According to the chart she has a diagnosis of Multiple Myeloma. She is GIP level of care for management of symptoms. She is supported by her son Tay Monroy and daughter in law Ines. She has one grandson. Ms. Moncho Chase has been a  since 2014. She is a Roman Catholic and has attended the [facility] all her life. The [employer] now is call KATERIN Saucedo of [facility] [employer]. Patient's son Tay Monroy has had some health problems in the recent past.  In 2019 he had Heart By Pass Surgery. Patient worked for may years for [employer] as a /[employer]. 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 According to her son, she has been active until the recent past.  She loved working in her flower garden. Assessment:  
 began with a telephone call to Tay Monroy. He was very kind and welcoming of 's call. He spoke fondly of his mother. He talked about her Sobeida and Devotion to God and the family. Tay Monroy is very close to his mother. He voiced wanting what is best for her. He said he feels comfortable with her being at [facility] and actually knows he can sleep at night and she will be in good hands. His Sobeida is important to him. His Sobeida gives him assurance that when mom goes she will be at peace and in the presence of God.  affirmed his Sobeida, provided opportunity for open nonjudgmental communication and offered prayer.  assured him that mom would have excellent care.  spoke of the availability of a  and encouraged him to let the nurse know if he would like to speak to a . Following conversation with Tay Monroy,  visited his mother. She was not alert. Her eyes were closed and she had the sound of snoring with every breath.   offered a quiet prayer at bedside. Plan: Continue to provide spiritual and emotional support for patient and family. Focus on anticipatory grief.

## 2021-06-04 NOTE — PROGRESS NOTES
1900 Walking rounds completed with Loraine De La O RN. Pt identified by name and . Pt admitted under [facility] care on 6/3/2021 with a hospice diagnosis of multiple myeloma with mets to the bone. Pt will arouse to voice; periods of confusion noted. Complete care. Pt currently on clear liquid diet. Schneider catheter placed on 2021. Pt has 20G right antecubital. Pt has needlestick in upper left leg and covered with 4x4s, ABD pads, and wrapped in kerlix. Pt resting in bed with eyes closed; RR even and non labored. No signs of pain or distress noted. No signs of NVD or SOB. Bed in lowest and locked position with all safety measures in place. FLACC 0/10.  
 
2148 Scheduled medication given as per orders. Pt resting peacefully in bed with eyes closed; no signs of pain or distress noted. RR even and non labored. No signs of NVD or SOB. FLACC 0/10.  
 
2232 Pt resting quietly in bed with eyes closed; no signs of pain or distress noted. RR even and non labored. No signs of NVD or SOB. FLACC 0/10. Pt repositioned for comfort and skin integrity. 0047 Pt resting calmly in bed with eyes closed; no signs of pain or distress noted. RR even and non labored. No signs of NVD or SOB. FLACC 0/10. Pt pulled up in bed. 
 
0244 Pt resting peacefully in bed with eyes closed; no signs of pain or distress noted. RR even and non labored. No signs of NVD or SOB. FLACC 0/10.  
 
0436 Scheduled medication given per orders. Dressing to left thigh changed due to saturation; noted a large amount of serosanguinous drainage on dressing. Site cleaned with wound cleanser and covered with 4x4s, ABD pads, and wrapped with kerlix. Pt resting peacefully in bed with eyes closed; no signs of pain or distress noted. RR even and non labored. No signs of NVD or SOB. FLACC 0/10. 
 
0549 Pt resting calmly in bed with eyes closed; no signs of pain or distress noted. RR even and non labored. No signs of NVD or SOB. FLACC 0/10. Pt pulled up in bed.  
 
Walking rounds completed with Jenni Stevenson RN.

## 2021-06-04 NOTE — [facility] IDG MASTER NOTE
[address] [facility] Date: 06/04/21 Time: 1:47 PM 
 
___________________ Patient: Capo Hogue Coverage Information: 
   Payor: [facility] MEDICARE Plan: [facility] MEDICARE PART A AND B Subscriber ID: [id number] Phone Number: MRN: [id number] Current Benefit Period: Benefit Period 1 Start Date: 6/3/2021 End Date: 8/31/2021 Hospice Attending Provider: Parker Hwang MD 
[address] [place] [address]  [address] Phone: [phone] Fax: [phone] Level of Care: General Inpatient Care 
 
 
___________________ Diagnoses: The primary encounter diagnosis was Acute encephalopathy. Diagnoses of Hypercalcemia, Idiopathic hypotension, Multiple myeloma not having achieved remission ([facility] [address] Acute renal failure with tubular necrosis ([facility] [address] Coronary artery disease involving native coronary artery of native heart without angina pectoris, Cancer associated pain, and Other megaloblastic anemia were also pertinent to this visit. Current Medications: 
 
Current Facility-Administered Medications:  
  sodium chloride (NS) flush 3 mL, 3 mL, IntraVENous, Q12H, Parker Hwang MD, 3 mL at 06/04/21 1041 
  sodium chloride (NS) flush 3 mL, 3 mL, IntraVENous, PRN, Parker Hwang MD, 3 mL at 06/04/21 4792   morphine injection 2 mg, 2 mg, SubCUTAneous, Q20MIN PRN **OR** morphine injection 2 mg, 2 mg, IntraVENous, Q20MIN PRN, Parker Hwang MD 
  morphine injection 2 mg, 2 mg, IntraVENous, Q6H, 2 mg at 06/04/21 1041 **OR** morphine injection 2 mg, 2 mg, SubCUTAneous, Q6H, Parker Hwang MD 
  LORazepam (ATIVAN) tablet 1 mg, 1 mg, Oral, Q4H PRN, Parker Hwang MD 
  diphenhydrAMINE (BENADRYL) injection 25 mg, 25 mg, IntraVENous, Q6H PRN, Parker Hwang MD 
  acetaminophen (TYLENOL) tablet 650 mg, 650 mg, Oral, Q4H PRN, Parker Hwang MD 
  LORazepam (ATIVAN) tablet 1 mg, 1 mg, Oral, Q4H PRN, Careyestine MD Jaiden 
  senna (SENOKOT) tablet 8.6 mg, 1 Tablet, Oral, BID, Nelson Bravo MD, 8.6 mg at 06/04/21 1040 
  haloperidol lactate (HALDOL) injection 2 mg, 2 mg, SubCUTAneous, Q1H PRN **OR** haloperidol lactate (HALDOL) injection 2 mg, 2 mg, IntraVENous, Q1H PRN, Nelson Bravo MD 
  haloperidol lactate (HALDOL) injection 2 mg, 2 mg, IntraVENous, Q12H, 2 mg at 06/04/21 1040 **OR** haloperidol lactate (HALDOL) injection 2 mg, 2 mg, IntraMUSCular, Q12H, Nelson Bravo MD 
  alum-mag hydroxide-simeth (MYLANTA) oral suspension 30 mL, 30 mL, Oral, QID PRN, Nelson Bravo MD 
  temazepam (RESTORIL) capsule 15 mg, 15 mg, Oral, QHS PRN, Nelosn Bravo MD 
  glycopyrrolate (ROBINUL) injection 0.2 mg, 0.2 mg, SubCUTAneous, Q4H PRN, Nelson Bravo MD 
 
Orders: 
Orders Placed This Encounter  IP CONSULT TO SPIRITUAL CARE Standing Status:   Standing Number of Occurrences:   1 Order Specific Question:   Reason for Consult: Answer: Once on week one, then PRN. For [facility] Only. For contracted patients, their primary hospice will continue to manage spiritual care needs.  ADULT DIET Clear Liquid Standing Status:   Standing Number of Occurrences:   1 Order Specific Question:   Primary Diet: Answer:   Clear Liquid [address] Standing Status:   Standing Number of Occurrences:   1  
 NURSING-MISCELLANEOUS: Comfort Care Measures CONTINUOUS Standing Status:   Standing Number of Occurrences:   1 Order Specific Question:   Description of Order: Answer:   Comfort Care Measures  SOLIS CATHETER, CARE 1. Solis Catheter care every shift and PRN 2. Notify Physician of Solis Catheter leakage, occlusion, gross adherent sediment or accidental removal 
3. Change Solis 30 days after insertion. Standing Status:   Standing Number of Occurrences:   [id number] 175 [address] May scan bladder PRN for urinary retention and or patient discomfort Standing Status:   Standing Number of Occurrences:   [id number]  
 NURSING ASSESSMENT:  SPECIFY Assess for GIP, routine, or respite level of care. Q SHIFT Routine Standing Status:   Standing Number of Occurrences:   1 Order Specific Question:   Please describe the test or procedure you would like to order. Answer:   Assess for GIP, routine, or respite level of care.  PAIN ASSESSMENT Pain and Symptoms: Assess ever 4 hours and PRN, for GIP level of care. an 35-year-old woman found to have her principal diagnosis of multiple myeloma (kappa light chain) when she presented to [facility] last month with di. ..  
  an 35-year-old woman found to have her principal diagnosis of multiple myeloma (kappa light chain) when she presented to [facility] last month with diffuse pelvic and spinal pain. Family also described altered mental state for this previously independent but now severely debilitated octogenarian. Hypercalcemia was diagnosed as the principal cause of her metabolic encephalopathy. This failed to improve after administration of Zometa had corrected her hypercalcemia. An E. coli urinary tract infection was a secondary contributing factor which was also addressed with appropriate intravenous antibiotics. Patient continues to show marked agitation and delirium requiring parenteral Haldol for patient safety and comfort. Her son as responsible party has chosen to forego disease modifying therapy for the multiple myeloma and to limit further care to comfort measures only. Under the circumstances Ms. Humberto Huber life expectancy is less than a few weeks. In the interim she will continue to require parenteral psychotropic medication for delirium and parenteral opioid for severe bone pain. Once a stable oral regimen of analgesic and antipsychotic medication is achieved, appropriate place for the balance of the patient's remaining lifetime will be determined. Standing Status:   Standing   Number of Occurrences:   [id number] Order Specific Question:   Please describe the test or procedure you would like to order. Answer:   Pain and Symptoms: Assess ever 4 hours and PRN, for GIP level of care.  DRESSING CHANGE - PICC, MLC, SUBCUTANEOUS AND ACCESSED PORT DRESSING CHANGE  
  PICC, MLC, SUBCUTANEOUS AND ACCESSED PORT DRESSING CHANGE: 
Change catheter site dressing every 5 days and prn if site dressing becomes damp loosened or visibly soiled Standing Status:   Standing Number of Occurrences:   [id number]  ACTIVITY AS TOLERATED W/ASSIST Standing Status:   Standing Number of Occurrences:   1  DO NOT RESUSCITATE Standing Status:   Standing Number of Occurrences:   1 Order Specific Question:   Comfort Measures Only? Answer: Yes  OXYGEN CANNULA Liters per minute: 2; Indications for O2 therapy: RESPIRATORY DISTRESS CONTINUOUS Routine Standing Status:   Standing Number of Occurrences:   1 Order Specific Question:   Liters per minute: Answer:   2 Order Specific Question:   Indications for O2 therapy Answer:   RESPIRATORY DISTRESS  traMADoL (ULTRAM) 50 mg tablet Sig: Take 50 mg by mouth every eight (8) hours as needed.  sodium chloride (NS) flush 3 mL  sodium chloride (NS) flush 3 mL  OR Linked Order Group  morphine injection 2 mg  morphine injection 2 mg  OR Linked Order Group  morphine injection 2 mg  morphine injection 2 mg  LORazepam (ATIVAN) tablet 1 mg  DISCONTD: haloperidoL (HALDOL) tablet 2 mg  diphenhydrAMINE (BENADRYL) injection 25 mg  
 acetaminophen (TYLENOL) tablet 650 mg  LORazepam (ATIVAN) tablet 1 mg  senna (SENOKOT) tablet 8.6 mg  
 OR Linked Order Group  haloperidol lactate (HALDOL) injection 2 mg  haloperidol lactate (HALDOL) injection 2 mg  OR Linked Order Group  haloperidol lactate (HALDOL) injection 2 mg  haloperidol lactate (HALDOL) injection 2 mg  alum-mag hydroxide-simeth (MYLANTA) oral suspension 30 mL  temazepam (RESTORIL) capsule 15 mg  
 glycopyrrolate (ROBINUL) injection 0.2 mg  
 INITIAL PHYSICIAN ORDER: HOSPICE Level Of Care: General Inpatient; Reason for Admission: symptom management: pain and delirium Standing Status:   Standing Number of Occurrences:   1 Order Specific Question:   Status Answer:   Hospice Order Specific Question:   Level Of Care Answer:   General Inpatient Order Specific Question:   Reason for Admission Answer:   symptom management: pain and delirium Order Specific Question:   Inpatient Hospitalization Certified Necessary for the Following Reasons Answer:   3. Patient receiving treatment that can only be provided in an inpatient setting (further clarification in H&P documentation) Order Specific Question:   Admitting Diagnosis Answer:   Multiple myeloma not having achieved remission ([facility] [8103491] Order Specific Question:   Terminal Prognosis Diagnosis(es) Answer:   Orthostatic hypotension [458. 0. ICD-9-CM] Order Specific Question:   Terminal Prognosis Diagnosis(es) Answer:   Hypercalcemia [275.42. 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 For contracted patients, their primary hospice will continue to manage social work needs. Allergies: Allergies Allergen Reactions  Pcn [Penicillins] Rash Care Plan: 
Encounter Problems (Active) Problem: Anxiety/Agitation Dates: Start: 06/03/21 Disciplines: Interdisciplinary Goal: Verbalize and demonstrate ability to manage anxiety Dates: Start: 06/03/21   Expected End: 06/12/21 Description: The patient/family/caregiver will verbalize and demonstrate ability to manage the patient's anxiety throughout hospice care. Disciplines: Interdisciplinary Intervention: Assess for anxiety/agitation Dates: Start: 06/03/21 Description: Assess for signs and symptoms of anxiety and agitation. Intervention: Instruction strategies to reduce anxiety/agitation Dates: Start: 06/03/21 Description: Instruct patient/caregiver on strategies to reduce anxiety/agitation. Problem: End of Life Process Dates: Start: 06/03/21 Disciplines: Interdisciplinary Goal: Demonstrate understanding of end of life processes Dates: Start: 06/03/21   Expected End: 06/12/21 Description: Patient/caregiver will understand end of life processes. Disciplines: Interdisciplinary Intervention: Assess for signs/symptoms of terminal restlessness Dates: Start: 06/03/21 Intervention: Instruct on strategies to reduce terminal restlessness Dates: Start: 06/03/21 Intervention: Instruct on the dying process Dates: Start: 06/03/21 Intervention: Instruct: imminent death Dates: Start: 06/03/21 Intervention: Instruct: process at end of life  Dates: Start: 06/03/21 Problem: Falls - Risk of   
 Dates: Start: 06/03/21 Disciplines: Interdisciplinary Goal: *Absence of Falls Dates: Start: 06/03/21   Expected End: 06/19/21 Description: Document Onalee Gum Fall Risk and appropriate interventions in the flowsheet. Disciplines: Interdisciplinary Problem: Hospice Orientation Dates: Start: 06/03/21 Disciplines: Interdisciplinary Goal: Demonstrate understanding of hospice philosophy, plan of care, and home hospice program   
 Dates: Start: 06/03/21   Expected End: 06/06/21 Description: The patient/family/caregiver will demonstrate understanding of hospice philosophy, plan of care and the home hospice program as evidenced by participation in meeting the patient's psychosocial, spiritual, medical, and physical needs inclusive of medical supplies/equipment focusing on symptoms. Disciplines: Interdisciplinary Intervention: Instruct on hospice philosophy Dates: Start: 06/03/21 Intervention: Instruct: hospice orientation Dates: Start: 06/03/21 Intervention: Instruct: medical equipment Dates: Start: 06/03/21 Description: Instruct patient/caregiver on medical equipment and supplies. Intervention: Instruct: medical power of  and will Dates: Start: 06/03/21 Intervention: Instruct:terminal illness Dates: Start: 06/03/21 Problem: Pain Dates: Start: 06/03/21 Disciplines: Interdisciplinary Goal: Verbalize satisfaction of level of comfort and symptom control Dates: Start: 06/03/21   Expected End: 06/12/21 Disciplines: Interdisciplinary Intervention: Assess effectiveness of pain management Dates: Start: 06/03/21 Intervention: Assess for signs/symptoms of acute pain Dates: Start: 06/03/21 Intervention: Assess for signs/symptoms of chronic pain Dates: Start: 06/03/21  Intervention: Instruct on non-pharmacological pain management Dates: Start: 06/03/21 Intervention: Instruct on pain scales Dates: Start: 06/03/21 Intervention: Instruct on pharmacological pain management Dates: Start: 06/03/21 Problem: Patient Education: Go to Patient Education Activity Dates: Start: 06/03/21 Disciplines: Interdisciplinary Goal: Patient/Family Education Dates: Start: 06/03/21   Expected End: 06/19/21 Disciplines: Interdisciplinary Care Plan Problems/Goals Progressing Towards Goal (6) *Absence of Falls (Falls - Risk of) Disciplines:  Interdisciplinary Expected end:  06/19/21 Outcome: Progressing Towards Goal By Irasema Portillo on 06/04/21 1486 Patient/Family Education (Patient Education: Go to Patient Education Activity) Disciplines:  Interdisciplinary Expected end:  06/19/21 Outcome: Progressing Towards Goal By Irasema Portillo on 06/04/21 3335 Demonstrate understanding of hospice philosophy, plan of care, and home hospice program [facility] Orientation) Disciplines:  Interdisciplinary Expected end:  06/06/21 Outcome: Progressing Towards Goal By Irasema Portillo on 06/04/21 2714 Verbalize satisfaction of level of comfort and symptom control (Pain) Disciplines:  Interdisciplinary Expected end:  06/12/21 Outcome: Progressing Towards Goal By Irasema Portillo on 06/04/21 5317 Verbalize and demonstrate ability to manage anxiety (Anxiety/Agitation) Disciplines:  Interdisciplinary Expected end:  06/12/21 Outcome: Progressing Towards Goal By Irasema Portillo on 06/04/21 8839 Demonstrate understanding of end of life processes (End of Life Process) Disciplines:  Interdisciplinary Expected end:  06/12/21 Outcome: Progressing Towards Goal By Irasema Portillo on 06/04/21 1530  
  
  
 
  
  
  
  
  
 
 
___________________ Care Team Notes POC/IDG Notes [address] IDG Nurse Notes by Andreas Das RN at 06/04/21 1344  Version 1 of 1 Author: Andreas Das RN Service: NURSING Author Type: Registered Nurse Filed: 06/04/21 1346 Date of Service: 06/04/21 1344 Status: Signed : Andreas Das RN (Registered Nurse) Patient: Lindsey Morton Date: 06/04/21 Time: 1:44 PM 
 
[facility] Nurse Notes 1st IDG: Pt is a [de-identified]year-old Female with myloma who is here GIP level of care for management of delirium. Schneider with UOP of 825ml IV access: PIV Right FA 
 PO intake: Bites and Sips Oxygen: Room AIr Wounds: Left thigh / Mid Spine surgical site PRN medications: Haldol 2mg BID x 2 doses for agitation / Morphine 2mg x 4 doses for pain Scheduled meds: Haldol 2mg Q 12 hours / Morphine 2mg Q 6 hours / Senna 8.6mg BID Plan: D/C scheduled Haldol. Comprehensive plan of care reviewed. IDG and pt./family in agreement with plan of care. The IDG identifies through on-going assessment when a change is needed to the POC; the pt/family will receive care and services necessitated by changes in POC. Medications reviewed by the pharmacist and Medical Director. Signed by: Federica Varela RN 
 
  
[address] IDG Volunteer Notes by Johnnie Childers at 06/04/21 1140  Version 1 of 1 Author: True Blowers Service: Hospice and Palliative Care Author Type: Hospice Volunteer/ Filed: 06/04/21 1202 Date of Service: 06/04/21 1140 Status: Signed : True Blowers [facility] Volunteer/) Patient: Lindsey Morton Date: 06/04/21 Time: 11:40 AM 
 
[facility]s Initial Hospice House IDG Note: (to be used at first IDG on patient) Volunteer  Hospice Interdisciplinary Plan of Care Review Status Codes I = Initiated   NV= No visits per Infection Control Policy or family request 
  
I. Volunteer Goal: Hospice house volunteer (s) enhances the quality of remaining life while patient is at the hospice house. Interventions: Sandrine Hester Volunteer (s) will provide companionship to the patient and/or family by visiting at the hospice house Sanrdine Hester Volunteer (s) will provide respite as needed when requested by patient and/or family. Sandrine Christie  Volunteer will provide activities such as music, reading, pet therapy, etc. as requested. Sandrine Jackson El  Comfort bag delivered. Any other special requests or information regarding volunteer services: 
 Staff have requested visits for companionship and volunteers have been notified by this Kezia 22. No further needs identified at this time. \[facility]\"" IDG  Notes by Omari Gunter at 06/04/21 1142  Version 1 of 1 Author: Omari Gunter Service: Spiritual Care Author Type: Pastoral Care Filed: 06/04/21 1144 Date of Service: 06/04/21 1142 Status: Signed : Omari Gunter (Pastoral Care) Patient: Kayleigh Sen Date: 06/04/21 Time: 11:42 AM 
 
\[facility]\""  Notes Assessment pending for spiritual and bereavement care. Signed by: Monika CORTES [facility] IDG  Notes by Jamal Barbosa LMSW at 06/04/21 1138  Version 1 of 1 Author: Jamal Barbosa LMSW Service: Licensed Clinical  Author Type:  Filed: 06/04/21 1139 Date of Service: 06/04/21 1138 Status: Signed : Jamal Barbosa LMSW () SW has read the initial comprehensive assessment and plan of care and acknowledges no urgent needs and is in agreement with plan SW to assess coping and needs each visit and offer availability. \[facility]\"" IDG  Notes by Omari Gunter at 06/04/21 8526  Version 1 of 1 Author: Omari Gunter Service: Spiritual Care Author Type: Pastoral Care Filed: 06/04/21 0928 Date of Service: 06/04/21 9399 Status: Signed : Omari Gunter (Pastoral Care) Patient: Kayleigh Sen Date: 06/04/21 Time: 9:27 AM 
 
\[facility]\""  Notes / Grief Counselor has reviewed  Initial Comprehensive Assessment and plan of care. Bereavement and Spiritual Care Assessments to be completed and plan of care put in place to meet the needs of patient and familly. Signed by: Mckayla Heard [address] [place] [address] Nurse Notes by Donna Henson RN at 06/03/21 1944  Version 1 of 1 Author: Donna Henson RN Service: NURSING Author Type: Registered Nurse Filed: 06/03/21 1944 Date of Service: 06/03/21 1944 Status: Signed : Donna Henson RN (Registered Nurse) Patient: Ricardo Peng Date: 06/03/21 Time: 7:44 PM 
 
[facility] Nurse Notes Nik Diehl is an [de-identified]year old female admitted to room 106 for hospice diagnosis of multiple myeloma with mets to the bone. Level of care is general inpatient for management of pain, agitation and delirium. Pt is alert and able to answer simple yes and no questions. She is able to state her name and date of birth and present time. She denies pain and shortness of breath. Physical assessment completed. Lung sounds clear but diminished in bases, heart sounds regular, abdomen soft with extensive ecchymosis to lower quadrants, pt has active bowel sounds, pt has dressing to left thigh with oozing serosanguinous drainage, changed dressing and applied 4 x 4, and two abd dressings wrapped with kerlix. Extremities warm with palpable pulses. Sacrum and bilateral heels with blanchable redness. Report from hospital nurse indicates pt has had periods of agitation and confusion alternating with periods of somnolence. Explained procedure for placing cook. 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 Placed #16 cook catheter with over 500 cc output of clear yellow urine. Pt tolerated without moaning or complaint.  
  
1619 pt's son, Momo Peter and daughter in law, Ariel Shah at bedside. Discussed hospice, plan of care, family situation. They state they are on board with hospice, they want her to be comfortable.  Mina states pt was living alone and caring for herself prior to a couple of falls which led to the diagnosis of multiple myeloma. They state she became very agitated and confused in the hospital. Dasha Wills states he has not left her side for days. Gave them the blue book oriented them to room and hospice. Asked them if they had any questions. Administered scheduled medication after explaining prn medication.   
1749 pt resting quietly with snoring. Not grimacing, groaning or agitation. Held senna due to pt sleeping 
  
 
 
 
Signed by: Kim Banks, RN Care Team Present:

## 2021-06-04 NOTE — PROGRESS NOTES
Problem: Falls - Risk of 
Goal: *Absence of Falls Description: Document Ramu Whittaker Fall Risk and appropriate interventions in the flowsheet. Outcome: Progressing Towards Goal 
Note: Fall Risk Interventions: 
Mobility Interventions: Bed/chair exit alarm Mentation Interventions: Adequate sleep, hydration, pain control, Bed/chair exit alarm, Door open when patient unattended Medication Interventions: Bed/chair exit alarm Elimination Interventions: Bed/chair exit alarm, Call light in reach History of Falls Interventions: Bed/chair exit alarm, Door open when patient unattended Problem: Patient Education: Go to Patient Education Activity Goal: Patient/Family Education Outcome: Progressing Towards Goal 
  
Problem: Hospice Orientation Goal: Demonstrate understanding of hospice philosophy, plan of care, and home hospice program 
Description: The patient/family/caregiver will demonstrate understanding of hospice philosophy, plan of care and the home hospice program as evidenced by participation in meeting the patient's psychosocial, spiritual, medical, and physical needs inclusive of medical supplies/equipment focusing on symptoms. Outcome: Progressing Towards Goal 
  
Problem: Pain Goal: Verbalize satisfaction of level of comfort and symptom control Outcome: Progressing Towards Goal 
  
Problem: Anxiety/Agitation Goal: Verbalize and demonstrate ability to manage anxiety Description: The patient/family/caregiver will verbalize and demonstrate ability to manage the patient's anxiety throughout hospice care. Outcome: Progressing Towards Goal 
  
Problem: End of Life Process Goal: Demonstrate understanding of end of life processes Description: Patient/caregiver will understand end of life processes.  
Outcome: Progressing Towards Goal

## 2021-06-05 NOTE — PROGRESS NOTES
Progress Note Patient: Tom Rasmussen MRN: [id number]  SSN: [id number] YOB: 1940  Age: [de-identified] y.o. Sex: female Admit Date: 6/3/2021 LOS: 2 days Subjective:  
Ms. Oralia Gaffney is an [de-identified]year old female admitted on 5/28/2021 to hospital  (transferred from [facility]) with new diagnosis of multiple myeloma.   
  
Hematology/oncology was  asked to see Bhumi Zaldivar for evaluation of hypercalcemia. She has a history of hypertensions with limited data in the BSS[facility] system. A CMP from [facility] one year ago showed no hypercalcemia. She was seen in our ED after a fall in April and had a normal calcium at that time with a creatinine of 1.02. Subsequent to this fall, she has had some back pain, but also become progressively more listless and anorexic especially over the past four days. This became associated with some mild confusions prompting medical evaluation. She was found to be hypercalcemic and with worsening renal function and was admitted. She was taking HCTZ as an outpatient. CT scanning of her head to evaluate her encephalopathy was notable for normal brain but multiple lytic lesions in the calvarium and skeletal survey revealed lytic lesion in left femoral diaphysis. 
  
She underwent SPEP with small M-spike of 0.28, IgA kappa band. FLC normal with normal ratio and UPEP unremarkable. She underwent BMBx that showed marrow cellularity of >90% diffusely infiltrated with plasma cells. She reportedly had good PS with very recent decline. She continued to have persistent confusion despite improvement in hypercalcemia. She was also diagnosed with a UTI and treated with Rocephin. She was evaluated by neurology and felt to be c/w metabolic encephalopathy. She was not felt to be a good candidate for treatment in current state and PC was consulted. PC discussed with son and opted for Hospice services. Review of Systems: 
Review of systems not obtained due to patient factors.  Report received from nursing and [facility] reviewed. Objective:  
 
Vitals:  
 06/03/21 1547 06/04/21 0445 06/05/21 0350 BP: (!) 144/68 (!) 141/67 117/60 Pulse: 96 (!) 108 (!) 116 Resp: 18 16 16 Temp: 98 °F (36.7 °C) 97.7 °F (36.5 °C) 98.8 °F (37.1 °C) SpO2: 95% Intake and Output: 
Current Shift: No intake/output data recorded. Last three shifts: 06/03 1901 - 06/05 0700 In: -  
Out: 5909 [[id number]] Physical Exam:  
GENERAL: alert, cooperative, no distress, appears stated age, pale, confused, disoriented LUNG: clear to auscultation bilaterally HEART: normal apical impulse EXTREMITIES:  extremities normal, atraumatic, no cyanosis or edema, edema is generalized in lower extremities at best 
NEUROLOGIC: Alert to self, confused, speech is clear but nonsensical , weakness noted in upper extremities. Right arm is sore. Lab/Data Review: No new labs resulted in the last 24 hours. Assessment:  
 
Active Problems: 
  Cancer associated pain (6/3/2021) Stable angina ([address] (2/16/2010) Diabetes mellitus type II, controlled ([address] (2/16/2010) Hypercalcemia (5/21/2021) Acute encephalopathy (5/22/2021) Acute renal failure with tubular necrosis ([address] (5/22/2021) Mild protein-calorie malnutrition ([address] (5/28/2021) Multiple myeloma ([address] (5/29/2021) TERA (generalized anxiety disorder) (6/9/2020) Hypotension (7/31/2018) Overview: Last Assessment & Plan:  
    Formatting of this note might be different from the original. 
    B/p noted to be low. Possible orthostatic hypotension episode vs vaso  
    vagal. Would DC Zestoretic. She states her PCP was going to DC if she lost  
    weight and she has. She has been encouraged to stay hydrated. She was  
    given IVFs here in ER. Osteoarthritis of knee (6/3/2021) Coronary artery disease involving native coronary artery of native heart without angina pectoris (7/31/2018)     Overview: Formatting of this note might be different from the original. 
    Pt had stent placed > 10 years ago in [facility]. Not followed by  
    cardiology presently. Last Assessment & Plan:  
    Formatting of this note might be different from the original. 
    Pt denies any recent or current CP/anginal complaints. Her EKG had some  
    artifact and resultant questionable ST changes. We will repeat this. She  
    did have an echo which showed no wall motion abnormalities and NL EF. Her  
    initial trop was negative. She is agreeable to staying for 3 hour troponin  
    and serial EKG. If negative she can be DC'd from a cardiac standpoint. She  
    would like to follow up with her PCP and then be referred to cardiology in  
    [place] if needed. She does take aspirin and statin daily which should  
    be continued. Plan:  
 
Current Facility-Administered Medications Medication Dose Route Frequency  sodium chloride (NS) flush 3 mL  3 mL IntraVENous Q12H  
 sodium chloride (NS) flush 3 mL  3 mL IntraVENous PRN  
 morphine injection 2 mg  2 mg SubCUTAneous Q20MIN PRN Or  
 morphine injection 2 mg  2 mg IntraVENous Q20MIN PRN  
 morphine injection 2 mg  2 mg IntraVENous Q6H Or  
 morphine injection 2 mg  2 mg SubCUTAneous Q6H  
 LORazepam (ATIVAN) tablet 1 mg  1 mg Oral Q4H PRN  
 diphenhydrAMINE (BENADRYL) injection 25 mg  25 mg IntraVENous Q6H PRN  
 acetaminophen (TYLENOL) tablet 650 mg  650 mg Oral Q4H PRN  
 LORazepam (ATIVAN) tablet 1 mg  1 mg Oral Q4H PRN  
 senna (SENOKOT) tablet 8.6 mg  1 Tablet Oral BID  
 haloperidol lactate (HALDOL) injection 2 mg  2 mg SubCUTAneous Q1H PRN Or  
 haloperidol lactate (HALDOL) injection 2 mg  2 mg IntraVENous Q1H PRN  
 haloperidol lactate (HALDOL) injection 2 mg  2 mg IntraVENous Q12H  Or  
 haloperidol lactate (HALDOL) injection 2 mg  2 mg IntraMUSCular Q12H  
 alum-mag hydroxide-simeth (MYLANTA) oral suspension 30 mL  30 mL Oral QID PRN  
 temazepam (RESTORIL) capsule 15 mg  15 mg Oral QHS PRN  
 glycopyrrolate (ROBINUL) injection 0.2 mg  0.2 mg SubCUTAneous Q4H PRN Agitation: patient requiring prn medication for agitation and pain. Will continue to adjust medications as needed. Diet advanced to pleasure today. Signed By: Karlee Fatima NP   
 June 5, 2021

## 2021-06-05 NOTE — PROGRESS NOTES
4206- Patient report taken from Sophia Perez RN. Walking rounds completed. Patient identified by name and . Patient is GIP with diagnosis of multiple myloma with mets to the bone. Discharge plan is for patient to remain under the care of [facility] at the [facility] until she meets her demise. The discharge plans are assessed at [facility] meetings and prn. Patient is resting with no signs of distress. The pain level is at 0/10. No signs of anxiety, SOB or N/V. The bed is low and locked with 2 bed rails up for safety. The patient's room is across from the nurses station. The door to the patient's room is left open for close observation. 0915- The patient's tab alert is sounding. When this RN arrives in the room the patient is standing beside the bed and states that she is trying to see the rail. This RN and MANGO Mckenzie assisted the patient back to bed. Opened the blinds to allow the patient to see outside. 1054- Patient states pain in her shoulder. She is unable to state level of pain. Medicated with Morphine 2 mg IV for pain and flushed with 3 ml of normal saline. Pain is 6/10 using non verbal pain scale. No signs of agitation, SOB or nausea / vomit observed. 1115- Patient is resting quietly in the bed with no grimace. Facial features are relaxed. The patient has family at the bedside. Family was educated on the patient's night and day up to this point and voiced understanding. 1236- Medicated with Morphine 2 mg IV for complaint of shoulder pain. Line flushed with 3 ml of normal saline. Medicated with Lorazepam 1 mg po in slurry form for anxiety. Patient is restless. Educated the family on why the Lorazepam was administered. 1300- Patient is now resting quietly in the bed with eyes closed. Respirations are even and unlabored on room air. Facial features are relaxed. Pain is at 0/10 using non verbal. No signs of agitation, SOB or N/V. Family remains at the bedside. Reported off to Earlean List., RN.

## 2021-06-05 NOTE — PROGRESS NOTES
Problem: Falls - Risk of 
Goal: *Absence of Falls Description: Document [place] Fall Risk and appropriate interventions in the flowsheet. 6/5/2021 1906 by Hattie Christensen RN Outcome: Progressing Towards Goal 
Note: Fall Risk Interventions: 
Mobility Interventions: Bed/chair exit alarm Mentation Interventions: Adequate sleep, hydration, pain control, Bed/chair exit alarm, Door open when patient unattended, Family/sitter at bedside, Reorient patient, Update white board Medication Interventions: Bed/chair exit alarm Elimination Interventions: Bed/chair exit alarm, Call light in reach History of Falls Interventions: Bed/chair exit alarm, Door open when patient unattended 6/5/2021 1834 by Hattie Christensen RN Outcome: Progressing Towards Goal 
Note: Fall Risk Interventions: 
Mobility Interventions: Bed/chair exit alarm Mentation Interventions: Adequate sleep, hydration, pain control, Bed/chair exit alarm, Door open when patient unattended, Family/sitter at bedside, Reorient patient, Update white board Medication Interventions: Bed/chair exit alarm Elimination Interventions: Bed/chair exit alarm, Call light in reach History of Falls Interventions: Bed/chair exit alarm, Door open when patient unattended Problem: Pain Goal: Verbalize satisfaction of level of comfort and symptom control 6/5/2021 1906 by Hattie Christensen RN Outcome: Progressing Towards Goal 
6/5/2021 1834 by Hattie Christensen RN Outcome: Progressing Towards Goal 
  
Problem: Anxiety/Agitation Goal: Verbalize and demonstrate ability to manage anxiety Description: The patient/family/caregiver will verbalize and demonstrate ability to manage the patient's anxiety throughout hospice care. 6/5/2021 1906 by Hattie Christensen RN Outcome: Progressing Towards Goal 
6/5/2021 1834 by Hattie Christensen RN Outcome: Progressing Towards Goal 
  
Problem: End of Life Process Goal: Demonstrate understanding of end of life processes Description: Patient/caregiver will understand end of life processes.  
Outcome: Progressing Towards Goal

## 2021-06-05 NOTE — PROGRESS NOTES
Problem: Falls - Risk of 
Goal: *Absence of Falls Description: Document [place] Fall Risk and appropriate interventions in the flowsheet. Outcome: Progressing Towards Goal 
Note: Fall Risk Interventions: 
Mobility Interventions: Bed/chair exit alarm Mentation Interventions: Adequate sleep, hydration, pain control, Bed/chair exit alarm, Door open when patient unattended, Family/sitter at bedside, Reorient patient, Update white board Medication Interventions: Bed/chair exit alarm Elimination Interventions: Bed/chair exit alarm, Call light in reach History of Falls Interventions: Bed/chair exit alarm, Door open when patient unattended Problem: Pain Goal: Verbalize satisfaction of level of comfort and symptom control Outcome: Progressing Towards Goal 
  
Problem: Anxiety/Agitation Goal: Verbalize and demonstrate ability to manage anxiety Description: The patient/family/caregiver will verbalize and demonstrate ability to manage the patient's anxiety throughout hospice care. Outcome: Progressing Towards Goal 
  
Problem: Infection - Risk of, Urinary Catheter-Associated Urinary Tract Infection Goal: *Absence of infection signs and symptoms Outcome: Progressing Towards Goal

## 2021-06-05 NOTE — PROGRESS NOTES
1400 Report received from Nazanin Fernandez RN. Patient identified by name and . Patient lying in bed in no acute distress. Her respirations are even and unlabored. She is sitting in high fowlers position and appears to be snoring. Family remains at her bedside and denies any current needs. FLACC 0/10. Safety measures remain in place, will continue to monitor for changes, safety, and comfort. 1600 Patient lying in bed with her eyes open, she appears restless at times per the family and has been complaining of her shoulder hurting. Education provided with family that is at the bedside regarding disease progression and symptom management. Patient repositioned for comfort with patient closing her eyes and appears to be falling off to sleep. FLACC 2/10. Safety measures remain in place, will continue to monitor for changes, safety, and comfort. 1830 Patient is lying in bed with her eyes closed, her respirations are becoming slower and has started having periods of apnea. Time spent with family discussing decline that is being seen at this time with understanding voiced. FLACC 1/10. Safety measures remain in place, will continue to monitor for changes, safety, and comfort.  
 
Walking rounds completed with Claudio Devine RN

## 2021-06-05 NOTE — PROGRESS NOTES
Problem: Anticipatory Grief Goal: Grief heard and acknowledged, anxiety reduced, patient coping identified, patient/family expressed gratitude Note: Family will acknowledge feelings of grief and anxiety associated with patient's impending death. They will [facility] them in their grief journey.

## 2021-06-05 NOTE — PROGRESS NOTES
Demographics Information provided by: son Gian Crowell Name:                                                               
 
Level of Care  GIP [x] Routine  [] Respite   [] From the in home program Yes [] No [x]  Team                                                     
 
Diagnosis: multiple myeloma Insurance    Medicare [x]   Medicaid  []  [employer]  []      Other [] Social  
 []   Single []     []    [x] Pt's spouse passed in 2014 Children: Son Gian Crowell is only child Community Resources Used in the Home prior to admission Yes []  No [x] Financial Concerns :                  
 
[place] Application Needed   Yes []  No [x] Medicaid application needed Yes []  No [x] IFA Form Complete  Yes [x]   No [] Discharge Plans: Plans are to remain [facility] for GIP and comfort care. Son reports he is aware that pt may not qualify long term. Son is open to either SNF placement or taking pt home. Work History : 
Retired [x] [employer] [] Part-time [] Disabled []  Service    Yes []  No [x] Branch   [employer] []   [employer] [] [employer] [] [employer] []  Affiliated [employer] []  [employer] [] Linked to [place]   Yes []  No [x] Referral made to [place] Fl[employer]board Yes [] No [x] Advanced Directives Scanned in the system Living Will  Yes  []  No [] HCPOA     Yes  [x]  No [] DPOA        Yes  []  No [] DNR           Yes [x]  No [] Final Arrangements: pt would like to be cremated and son states he will likely use [facility] Medical History and/or Narrative copied from the patients chart from the [address] [place] [address] Physician or Rn: 
 
[address] [place] [address] Nurse Notes Mireya Stevenson is an [de-identified]year old female admitted to room 106 for hospice diagnosis of multiple myeloma with mets to the bone. Level of care is general inpatient for management of pain, agitation and delirium.  Pt is alert and able to answer simple yes and no questions. She is able to state her name and date of birth and present time. She denies pain and shortness of breath. Physical assessment completed. Lung sounds clear but diminished in bases, heart sounds regular, abdomen soft with extensive ecchymosis to lower quadrants, pt has active bowel sounds, pt has dressing to left thigh with oozing serosanguinous drainage, changed dressing and applied 4 x 4, and two abd dressings wrapped with kerlix. Extremities warm with palpable pulses. Sacrum and bilateral heels with blanchable redness. Report from hospital nurse indicates pt has had periods of agitation and confusion alternating with periods of somnolence. Explained procedure for placing cook. 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  Placed #16 cook catheter with over 500 cc output of clear yellow urine. Pt tolerated without moaning or complaint. Mental Health History-none Volunteer discussion: Yes [x]    No [] Goals of care for the patient and family:To keep pt comfortable and to meet pt and family needs. Coping and Bereavement:                          
 
Was a Referral made to Bereavement  Yes [] No [x]

## 2021-06-05 NOTE — PROGRESS NOTES
Evelina report from Alise Kayser, RN. Pt Id by name and  during rounds.   Pt lying in bed. Calm. No s/sx of distress. No facial grimace. Flacc =0-1. Resp irreg shallow on 2 L n/c. Lungs diminished. HR irreg. BS hypo. Edema 2 + in BUE and 3 + in BLE noted. Schneider cath draining clear poly urine. SR up x 2. Bed low/locked. Call light with in reach. Door opened.   Scheduled haldol 2 mg and morphine 2 mg IVP given. Peripheral line flushed with 3 ml ns. Dressing clean/dry. Reinforced with tape. Pt tolerated well. 2315  Pt sleeping. Calm. No s/sx of distress. No facial grimace. Flacc =0-1. Resp non labored on 2 L n/c. SR up x 2. Bed low/locked. Call light with in reach. Door opened. 0144  Pt sleeping. No s/sx of distress. Flacc =0-1. Resp irreg non labored on 2 L n/c. SR up x 2. Bed low/locked. Call light with in reach. Door opened. 0332  Pt sleeping. Resting comfortably. No moans or facial grimace. Flacc =0-1. Resp irreg non labored on 2 L n/c. SR up x 2. Bed low/locked. Call light with in reach. Door opened. 0519  Pt with gown off. Restless. Gown placed back on pt. Pt repositioned. Resp irreg non labored on 2 L n/c.  Scheduled morphine 2 mg IVP given. SR up x 2. Bed low/locked. Call light with in reach. Door opened. Report given to Cisco Urbano RN.

## 2021-06-05 NOTE — PROGRESS NOTES
Problem: Falls - Risk of 
Goal: *Absence of Falls Description: Document [place] Fall Risk and appropriate interventions in the flowsheet. Outcome: Progressing Towards Goal 
Note: Fall Risk Interventions: 
Mobility Interventions: Bed/chair exit alarm Mentation Interventions: Adequate sleep, hydration, pain control, Bed/chair exit alarm, Door open when patient unattended, Family/sitter at bedside, Reorient patient, Update white board Medication Interventions: Bed/chair exit alarm Elimination Interventions: Bed/chair exit alarm, Call light in reach History of Falls Interventions: Bed/chair exit alarm, Door open when patient unattended Problem: Pain Goal: Verbalize satisfaction of level of comfort and symptom control Outcome: Progressing Towards Goal 
  
Problem: Anxiety/Agitation Goal: Verbalize and demonstrate ability to manage anxiety Description: The patient/family/caregiver will verbalize and demonstrate ability to manage the patient's anxiety throughout hospice care. Outcome: Progressing Towards Goal 
  
Problem: End of Life Process Goal: Demonstrate understanding of end of life processes Description: Patient/caregiver will understand end of life processes.  
Outcome: Progressing Towards Goal

## 2021-06-06 NOTE — PROGRESS NOTES
0700- Report received, patient resting quietly in bed with no s/sx pain or distress. Call light within reach. Bed is low and locked, tab alert in place, and door left open for continuous monitoring. Patient is currently under GIP loc. Will continue to assess need for change in LOC and continue to collaborate with IDG team regarding d/c planning. Santa- Patient resting with eyes closed. No s/sx of pain or distress. 9702- patient awake and scheduled medications administered. Patient is pleasantly confused and difficult to redirect. Pt son and DIL have just arrived. 1047- patient is wanting to get out of bed and restless. Son on call light thinking patient is in pain. He reports that she never complains of pain. Prn morphine given for pain and patient turned to L side. flacc 5/10. Skin intact to backside. Scheduled senna given crushed in pudding. Patient tolerated well. 1100 - patient has settled and does not appear to be in pain. flacc 0/10. 
 
1209- per family, patient c/o shoulder pain. She appears restless upon assessment. Prn morphine given iv for pain, flacc 5/10 and prn ativan given for restlessness. Ativan given dissolved in tea via syringe. Pt tolerated well.  
 
1300- patient sleeping. Symptoms have been resolved. 1400- patient continues to sleep. 1635- patient awake with visitors at bedside. Scheduled morphine administered and patient turned to R side. 1814- patient snoring. Will hold senna at this time. Family remains at bedside.  
 
Report given to Kelly Moore

## 2021-06-06 NOTE — PROGRESS NOTES
Problem: Falls - Risk of 
Goal: *Absence of Falls Description: Document [place] Fall Risk and appropriate interventions in the flowsheet. Outcome: Progressing Towards Goal 
Note: Fall Risk Interventions: 
Mobility Interventions: Bed/chair exit alarm Mentation Interventions: Adequate sleep, hydration, pain control, Bed/chair exit alarm, Door open when patient unattended, Evaluate medications/consider consulting pharmacy, Reorient patient, Room close to nurse's station Medication Interventions: Bed/chair exit alarm, Evaluate medications/consider consulting pharmacy Elimination Interventions: Bed/chair exit alarm, Call light in reach History of Falls Interventions: Bed/chair exit alarm, Door open when patient unattended, Evaluate medications/consider consulting pharmacy, Room close to nurse's station Problem: Pain Goal: Verbalize satisfaction of level of comfort and symptom control Outcome: Progressing Towards Goal 
  
Problem: Anxiety/Agitation Goal: Verbalize and demonstrate ability to manage anxiety Description: The patient/family/caregiver will verbalize and demonstrate ability to manage the patient's anxiety throughout hospice care. Outcome: Progressing Towards Goal 
  
Problem: Infection - Risk of, Urinary Catheter-Associated Urinary Tract Infection Goal: *Absence of infection signs and symptoms Outcome: Progressing Towards Goal 
  
Problem: Pressure Injury - Risk of 
Goal: *Prevention of pressure injury Description: Document Isaac Scale and appropriate interventions in the flowsheet. Outcome: Progressing Towards Goal 
Note: Pressure Injury Interventions: 
Sensory Interventions: Assess changes in LOC, Float heels, Minimize linen layers, Check visual cues for pain, Keep linens dry and wrinkle-free, Pad between skin to skin Moisture Interventions: Absorbent underpads, Internal/External urinary devices, Minimize layers, Moisture barrier, Limit adult briefs, Apply protective barrier, creams and emollients Activity Interventions: Pressure redistribution bed/mattress(bed type) Mobility Interventions: Pressure redistribution bed/mattress (bed type), Float heels Nutrition Interventions: Document food/fluid/supplement intake Friction and Shear Interventions: Apply protective barrier, creams and emollients, Minimize layers, Lift sheet

## 2021-06-06 NOTE — PROGRESS NOTES
Received report from Blossom Coley RN. Pt Id by name and  during rounds.   Pt lying in bed with eyes closed. Lethargic. Non interactive at this time. No facial grimace. Flacc =0-1. Resp irreg non labored on 2 L n/c. Lungs diminished. HR irreg. BS hypo. Edema 1+ in BUE and 2+ in BLE noted. Schneider cath draining clear poly urine. SR up x 2. Bed low/locked. Call light with in reach. Door opened.   Scheduled morphine 2 mg and haldol 2 mg IVP given. Peripheral line flushed with 3 ml ns. Flushed well. Dressing clean/dry/intact. Pt tolerated well. 2310  Pt with eyes closed. Snoring. Flacc =0-1. Resp non labored irreg on 2 L n/c. SR up x 2. Bed low/locked. Call light with in reach. Door opened. 0136  Pt sleeping. No s/sx of distress. No agitation. Flacc =0-1. Resp irreg non labored on 2 L n/c. SR up x 2. Bed low/locked. Call light with in reach. Door opened. 0330   Pt with eyes closed. Flacc =0-1. Resp shallow non labored irreg on 2 L n/c. SR up x 2. Bed low/locked. Call light with in reach. Door opened. 3727  Scheduled morphine 2 mg IVP given. Pt tolerated well. 0545  Pt resting comfortably. No s/sx of distress. Flacc =0-1. Resp non labored on 2 L n/c. SR up x 2. Bed low/locked. Call light with in reach. Door opened. Report given to Blossom Coley RN.

## 2021-06-06 NOTE — PROGRESS NOTES
Problem: Falls - Risk of 
Goal: *Absence of Falls Description: patient will not have any falls or injuries during shift . Outcome: Progressing Towards Goal 
Note: Fall Risk Interventions: 
Mobility Interventions: Bed/chair exit alarm Mentation Interventions: Adequate sleep, hydration, pain control, Bed/chair exit alarm, Door open when patient unattended, Evaluate medications/consider consulting pharmacy Medication Interventions: Bed/chair exit alarm, Evaluate medications/consider consulting pharmacy Elimination Interventions: Bed/chair exit alarm, Toileting schedule/hourly rounds History of Falls Interventions: Bed/chair exit alarm, Door open when patient unattended, Room close to nurse's station Problem: Pain Goal: patient's pain will be controlled during shift aeb flacc of 2 or less. Morphine 2 mg iv scheduled q 6 and q 20 min prn. Outcome: Progressing Towards Goal 
  
Problem: Anxiety/Agitation Goal: Verbalize and demonstrate ability to manage anxiety Description: patient's anxiety and agitation will be controlled during shift aeb relaxed body and facial features and no trying to get out of bed. Scheduled haldol 2 mg iv q 12 hrs and prn q 1 hr 
Lorazepam 1 mg po q 4 hrs prn Outcome: Progressing Towards Goal

## 2021-06-07 NOTE — PROGRESS NOTES
Problem: Emotional Support Needs Goal: Patient/family is receiving emotional support Description: Pt, Valentine Denise, and family will receive emotional support each week through the use of education on the hospice experience, weekly check-ins, and validation of feelings.   
Outcome: Progressing Towards Goal

## 2021-06-07 NOTE — [facility] IDG MASTER NOTE
[address] [facility] Date: 06/07/21 Time: 2:43 PM 
 
___________________ Patient: Simran Ibrahim Coverage Information: 
   Payor: [facility] MEDICARE Plan: [facility] MEDICARE PART A AND B Subscriber ID: [id number] Phone Number: MRN: [id number] Current Benefit Period: Benefit Period 1 Start Date: 6/3/2021 End Date: 8/31/2021 Hospice Attending Provider: Anita Flores MD 
[address] [place] [address]  [id number] Phone: [phone] Fax: [phone] Level of Care: General Inpatient Care 
 
 
___________________ Diagnoses: The primary encounter diagnosis was Acute encephalopathy. Diagnoses of Hypercalcemia, Idiopathic hypotension, Multiple myeloma not having achieved remission ([facility] [address] Acute renal failure with tubular necrosis ([facility] [address] Coronary artery disease involving native coronary artery of native heart without angina pectoris, Cancer associated pain, and Other megaloblastic anemia were also pertinent to this visit. Current Medications: 
 
Current Facility-Administered Medications:  
  haloperidoL (HALDOL) tablet 2 mg, 2 mg, Oral, Q12H, Anita Flores MD 
  fentaNYL (DURAGESIC) 25 mcg/hr patch 1 Patch, 1 Patch, TransDERmal, Q72H, Anita Flores MD, 1 Patch at 06/07/21 1259   morphine (ROXANOL) concentrated oral syringe 10 mg, 10 mg, Oral, Q1H PRN, Anita Flores MD 
  sodium chloride (NS) flush 3 mL, 3 mL, IntraVENous, Q12H, Anita Flores MD, 3 mL at 06/07/21 0819 
  sodium chloride (NS) flush 3 mL, 3 mL, IntraVENous, PRN, Anita Flores MD, 3 mL at 06/07/21 6615   LORazepam (ATIVAN) tablet 1 mg, 1 mg, Oral, Q4H PRN, Anita Flores MD 
  diphenhydrAMINE (BENADRYL) injection 25 mg, 25 mg, IntraVENous, Q6H PRN, Anita Flores MD 
  acetaminophen (TYLENOL) tablet 650 mg, 650 mg, Oral, Q4H PRN, Anita Flores MD 
  LORazepam (ATIVAN) tablet 1 mg, 1 mg, Oral, Q4H PRN, Anita Flores MD, 1 mg at 06/07/21 7689   senna (SENOKOT) tablet 8.6 mg, 1 Tablet, Oral, BID, Christel Us MD, 8.6 mg at 06/07/21 5890   alum-mag hydroxide-simeth (MYLANTA) oral suspension 30 mL, 30 mL, Oral, QID PRN, Christel Us MD 
  temazepam (RESTORIL) capsule 15 mg, 15 mg, Oral, QHS PRN, Christel Us MD 
  glycopyrrolate (ROBINUL) injection 0.2 mg, 0.2 mg, SubCUTAneous, Q4H PRN, Christel Us MD 
 
Orders: 
Orders Placed This Encounter  IP CONSULT TO SPIRITUAL CARE Standing Status:   Standing Number of Occurrences:   1 Order Specific Question:   Reason for Consult: Answer: Once on week one, then PRN. For [facility] Only. For contracted patients, their primary hospice will continue to manage spiritual care needs.  ADULT DIET Clear Liquid Standing Status:   Standing Number of Occurrences:   1 Order Specific Question:   Primary Diet: Answer:   Clear Liquid [address] Standing Status:   Standing Number of Occurrences:   1  
 NURSING-MISCELLANEOUS: Comfort Care Measures CONTINUOUS Standing Status:   Standing Number of Occurrences:   1 Order Specific Question:   Description of Order: Answer:   Comfort Care Measures  SOLIS CATHETER, CARE 1. Solis Catheter care every shift and PRN 2. Notify Physician of Solis Catheter leakage, occlusion, gross adherent sediment or accidental removal 
3. Change Solis 30 days after insertion. Standing Status:   Standing Number of Occurrences:   [id number] scan bladder PRN for urinary retention and or patient discomfort Standing Status:   Standing Number of Occurrences:   [id number]  
 NURSING ASSESSMENT:  SPECIFY Assess for GIP, routine, or respite level of care. Q SHIFT Routine Standing Status:   Standing Number of Occurrences:   1 Order Specific Question:   Please describe the test or procedure you would like to order.   
  Answer:   Assess for GIP, routine, or respite level of care.  PAIN ASSESSMENT Pain and Symptoms: Assess ever 4 hours and PRN, for GIP level of care. an 27-year-old woman found to have her principal diagnosis of multiple myeloma (kappa light chain) when she presented to [facility] last month with di. ..  
  an 27-year-old woman found to have her principal diagnosis of multiple myeloma (kappa light chain) when she presented to [facility] last month with diffuse pelvic and spinal pain. Family also described altered mental state for this previously independent but now severely debilitated octogenarian. Hypercalcemia was diagnosed as the principal cause of her metabolic encephalopathy. This failed to improve after administration of Zometa had corrected her hypercalcemia. An E. coli urinary tract infection was a secondary contributing factor which was also addressed with appropriate intravenous antibiotics. Patient continues to show marked agitation and delirium requiring parenteral Haldol for patient safety and comfort. Her son as responsible party has chosen to forego disease modifying therapy for the multiple myeloma and to limit further care to comfort measures only. Under the circumstances Ms. Clark Colbert life expectancy is less than a few weeks. In the interim she will continue to require parenteral psychotropic medication for delirium and parenteral opioid for severe bone pain. Once a stable oral regimen of analgesic and antipsychotic medication is achieved, appropriate place for the balance of the patient's remaining lifetime will be determined. Standing Status:   Standing Number of Occurrences:   [id number] Order Specific Question:   Please describe the test or procedure you would like to order. Answer:   Pain and Symptoms: Assess ever 4 hours and PRN, for GIP level of care.   
 DRESSING CHANGE - PICC, MLC, SUBCUTANEOUS AND ACCESSED PORT DRESSING CHANGE  
  PICC, MLC, SUBCUTANEOUS AND ACCESSED PORT DRESSING CHANGE: Change catheter site dressing every 5 days and prn if site dressing becomes damp loosened or visibly soiled Standing Status:   Standing Number of Occurrences:   [id number]  ACTIVITY AS TOLERATED W/ASSIST Standing Status:   Standing Number of Occurrences:   1  DO NOT RESUSCITATE Standing Status:   Standing Number of Occurrences:   1 Order Specific Question:   Comfort Measures Only? Answer: Yes  OXYGEN CANNULA Liters per minute: 2; Indications for O2 therapy: RESPIRATORY DISTRESS CONTINUOUS Routine Standing Status:   Standing Number of Occurrences:   1 Order Specific Question:   Liters per minute: Answer:   2 Order Specific Question:   Indications for O2 therapy Answer:   RESPIRATORY DISTRESS  traMADoL (ULTRAM) 50 mg tablet Sig: Take 50 mg by mouth every eight (8) hours as needed.  sodium chloride (NS) flush 3 mL  sodium chloride (NS) flush 3 mL  DISCONTD: morphine injection 2 mg  DISCONTD: morphine injection 2 mg  DISCONTD: morphine injection 2 mg  DISCONTD: morphine injection 2 mg  LORazepam (ATIVAN) tablet 1 mg  DISCONTD: haloperidoL (HALDOL) tablet 2 mg  diphenhydrAMINE (BENADRYL) injection 25 mg  
 acetaminophen (TYLENOL) tablet 650 mg  LORazepam (ATIVAN) tablet 1 mg  senna (SENOKOT) tablet 8.6 mg  
 DISCONTD: haloperidol lactate (HALDOL) injection 2 mg  DISCONTD: haloperidol lactate (HALDOL) injection 2 mg  DISCONTD: haloperidol lactate (HALDOL) injection 2 mg  DISCONTD: haloperidol lactate (HALDOL) injection 2 mg  alum-mag hydroxide-simeth (MYLANTA) oral suspension 30 mL  temazepam (RESTORIL) capsule 15 mg  
 glycopyrrolate (ROBINUL) injection 0.2 mg  
 haloperidoL (HALDOL) tablet 2 mg  fentaNYL (DURAGESIC) 25 mcg/hr patch 1 Patch  morphine (ROXANOL) concentrated oral syringe 10 mg  
 INITIAL PHYSICIAN ORDER: HOSPICE Level Of Care: General Inpatient; Reason for Admission: symptom management: pain and delirium Standing Status:   Standing Number of Occurrences:   1 Order Specific Question:   Status Answer:   Hospice Order Specific Question:   Level Of Care Answer:   General Inpatient Order Specific Question:   Reason for Admission Answer:   symptom management: pain and delirium Order Specific Question:   Inpatient Hospitalization Certified Necessary for the Following Reasons Answer:   3. Patient receiving treatment that can only be provided in an inpatient setting (further clarification in H&P documentation) Order Specific Question:   Admitting Diagnosis Answer:   Multiple myeloma not having achieved remission ([facility] [address] [9114507] Order Specific Question:   Terminal Prognosis Diagnosis(es) Answer:   Orthostatic hypotension [458. 0. ICD-9-CM] Order Specific Question:   Terminal Prognosis Diagnosis(es) Answer:   Hypercalcemia [275.42. 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) [7750270] Order Specific Question:   Terminal Prognosis Diagnosis(es) Answer: Thrombocytopenia ([facility] [address] [199157] Order Specific Question:   Admitting Physician Answer:   Josefina David Order Specific Question:   Attending Physician Answer:   Josefina David Order Specific Question:   Discharge Plan: Answer:   Home with Home Hospice  IP CONSULT TO SOCIAL WORK Standing Status:   Standing Number of Occurrences:   1 Order Specific Question:   Reason for Consult: Answer:    For [facility] Only. For contracted patients, their primary hospice will continue to manage social work needs. Allergies: Allergies Allergen Reactions  Pcn [Penicillins] Rash Care Plan: 
Encounter Problems (Active) Problem: Anticipatory Grief Dates: Start: 06/04/21 Disciplines: Interdisciplinary Goal: Grief heard and acknowledged, anxiety reduced, patient coping identified, patient/family expressed gratitude Dates: Start: 06/04/21   Expected End: 06/11/21 Disciplines: Interdisciplinary Intervention: Assess grief responses Dates: Start: 06/04/21 Description: Assess for feelings of grief Intervention: Support grieving process Dates: Start: 06/04/21 Description: Address feelings of loss, anticipatory grief, expression of concern. Problem: Anxiety/Agitation Dates: Start: 06/03/21 Disciplines: Interdisciplinary Goal: Verbalize and demonstrate ability to manage anxiety Dates: Start: 06/03/21   Expected End: 06/12/21 Description: The patient/family/caregiver will verbalize and demonstrate ability to manage the patient's anxiety throughout hospice care. Disciplines: Interdisciplinary Intervention: Assess for anxiety/agitation Dates: Start: 06/03/21 Description: Assess for signs and symptoms of anxiety and agitation. Intervention: Instruction strategies to reduce anxiety/agitation Dates: Start: 06/03/21 Description: Instruct patient/caregiver on strategies to reduce anxiety/agitation. Problem: Emotional Support Needs Dates: Start: 06/07/21 Description: Pt, pt's son Samantha Albert, pt's sister Sveta Herring, and pt's family will have their emotional support needs met weekly by SW. Disciplines: Interdisciplinary Goal: Patient/family is receiving emotional support Dates: Start: 06/07/21   Expected End: 06/11/21  Description: Pt, Ann Daniels, and family will receive emotional support each week through the use of education on the hospice experience, weekly check-ins, and validation of feelings. Disciplines: Interdisciplinary Intervention: Assess for emotional distress Dates: Start: 06/07/21 Description: SW will assess for emotional distress through the use of open-ended questions, motivational interviewing, and paraphrasing of discussions to ensure pt, Deleta Coffin, and pt's family and heard accurately. Intervention: Provide emotional support of the family's cultural expressions of grief and loss Dates: Start: 06/07/21 Description: SW will provide emotional support of the family's cultural expression of grief, loss, and response to illness. Problem: End of Life Process Dates: Start: 06/03/21 Disciplines: Interdisciplinary Goal: Demonstrate understanding of end of life processes Dates: Start: 06/03/21   Expected End: 06/12/21 Description: Patient/caregiver will understand end of life processes. Disciplines: Interdisciplinary Intervention: Assess for signs/symptoms of terminal restlessness Dates: Start: 06/03/21 Intervention: Instruct on strategies to reduce terminal restlessness Dates: Start: 06/03/21 Intervention: Instruct on the dying process Dates: Start: 06/03/21 Intervention: Instruct: imminent death Dates: Start: 06/03/21 Intervention: Instruct: process at end of life Dates: Start: 06/03/21 Problem: Falls - Risk of   
 Dates: Start: 06/03/21 Disciplines: Interdisciplinary Goal: *Absence of Falls Dates: Start: 06/03/21   Expected End: 06/19/21 Description: Document Niharika Broad Fall Risk and appropriate interventions in the flowsheet. Disciplines: Interdisciplinary Problem: Hospice Orientation Dates: Start: 06/03/21 Disciplines: Interdisciplinary  Goal: Demonstrate understanding of hospice philosophy, plan of care, and home hospice program   
 Dates: Start: 06/03/21   Expected End: 06/06/21 Description: The patient/family/caregiver will demonstrate understanding of hospice philosophy, plan of care and the home hospice program as evidenced by participation in meeting the patient's psychosocial, spiritual, medical, and physical needs inclusive of medical supplies/equipment focusing on symptoms. Disciplines: Interdisciplinary Intervention: Instruct on hospice philosophy Dates: Start: 06/03/21 Intervention: Instruct: hospice orientation Dates: Start: 06/03/21 Intervention: Instruct: medical equipment Dates: Start: 06/03/21 Description: Instruct patient/caregiver on medical equipment and supplies. Intervention: Instruct: medical power of  and will Dates: Start: 06/03/21 Intervention: Instruct:terminal illness Dates: Start: 06/03/21 Problem: Infection - Risk of, Urinary Catheter-Associated Urinary Tract Infection Dates: Start: 06/05/21 Disciplines: Interdisciplinary Goal: *Absence of infection signs and symptoms Dates: Start: 06/05/21   Expected End: 06/08/21 Disciplines: Interdisciplinary Intervention: Urinary catheter needs assessment (eg: Impaired neurologic status; post void residual; spinal cord injury; urinary outflow obstruction; urinary retention; urinary incontinence; fluid imbalance) Dates: Start: 06/05/21 Intervention: Urine assessment (eg: Clarity; color; odor; volume) Dates: Start: 06/05/21 Intervention: Infection signs and symptoms monitoring - urinary tract (eg: Flank or suprapubic pain; burning; fever; dysuria; frequency; urgency; cloudy/bloody/foul odor urine; confusion/agitation; incontinence) Dates: Start: 06/05/21  Intervention: Lab monitoring (eg: Urinalysis; culture and sensitivity; complete blood count with differential) Dates: Start: 06/05/21 Intervention: Urinary catheter management (eg: Closed urinary catheter system maintenance; urinary catheter patency with free downhill flow; perineal care; monitor intake and output) Dates: Start: 06/05/21 Intervention: Urinary catheter discontinuation Dates: Start: 06/05/21 Description: REMINDER(s): 
Urinary catheter discontinuation promptly as indicated; remind physician to remove at or before day 5. Problem: Pain Dates: Start: 06/03/21 Disciplines: Interdisciplinary Goal: Verbalize satisfaction of level of comfort and symptom control Dates: Start: 06/03/21   Expected End: 06/12/21 Disciplines: Interdisciplinary Intervention: Assess effectiveness of pain management Dates: Start: 06/03/21 Intervention: Assess for signs/symptoms of acute pain Dates: Start: 06/03/21 Intervention: Assess for signs/symptoms of chronic pain Dates: Start: 06/03/21 Intervention: Instruct on non-pharmacological pain management Dates: Start: 06/03/21 Intervention: Instruct on pain scales Dates: Start: 06/03/21 Intervention: Instruct on pharmacological pain management Dates: Start: 06/03/21 Problem: Patient Education: Go to Patient Education Activity Dates: Start: 06/03/21 Disciplines: Interdisciplinary Goal: Patient/Family Education Dates: Start: 06/03/21   Expected End: 06/19/21 Disciplines: Interdisciplinary Problem: Patient Education: Go to Patient Education Activity Dates: Start: 06/05/21 Disciplines: Interdisciplinary Goal: Patient/Family Education Dates: Start: 06/05/21 Disciplines: Interdisciplinary Problem: Pressure Injury - Risk of   
 Dates: Start: 06/05/21 Disciplines: Interdisciplinary Goal: *Prevention of pressure injury Dates: Start: 06/05/21   Expected End: 06/09/21 Description: Document Isaac Scale and appropriate interventions in the flowsheet. Disciplines: Interdisciplinary Care Plan Problems/Goals Progressing Towards Goal (9) *Absence of Falls (Falls - Risk of) Disciplines:  Interdisciplinary Expected end:  06/19/21 Outcome: Progressing Towards Goal By Lenka Brooks RN on 06/07/21 5112 Patient/Family Education (Patient Education: Go to Patient Education Activity) Disciplines:  Interdisciplinary Expected end:  06/19/21 Outcome: Progressing Towards Goal By Breanna Ball on 06/04/21 1130 Demonstrate understanding of hospice philosophy, plan of care, and home hospice program [facility] Orientation) Disciplines:  Interdisciplinary Expected end:  06/06/21 Outcome: Progressing Towards Goal By Breanna Ball on 06/04/21 6692 Verbalize satisfaction of level of comfort and symptom control (Pain) Disciplines:  Interdisciplinary Expected end:  06/12/21 Outcome: Progressing Towards Goal By Lenka Brooks RN on 06/07/21 0908 Verbalize and demonstrate ability to manage anxiety (Anxiety/Agitation) Disciplines:  Interdisciplinary Expected end:  06/12/21 Outcome: Progressing Towards Goal By Lenka Brooks RN on 06/07/21 3203 Demonstrate understanding of end of life processes (End of Life Process) Disciplines:  Interdisciplinary Expected end:  06/12/21 Outcome: Progressing Towards Goal By Hattie Christensen RN on 06/05/21 1057 *Absence of infection signs and symptoms (Infection - Risk of, Urinary Catheter-Associated Urinary Tract Infection) Disciplines:  Interdisciplinary Expected end:  06/08/21 Outcome: Progressing Towards Goal By Lenka Brooks RN on 06/07/21 2215 *Prevention of pressure injury (Pressure Injury - Risk of) Disciplines:  Interdisciplinary Expected end:  06/09/21  Outcome: Progressing Towards Goal By Debo Interiano RN on 06/07/21 2112 Patient/family is receiving emotional support (Emotional Support Needs) Disciplines:  Interdisciplinary Expected end:  06/11/21 Outcome: Progressing Towards Goal By Floyd Galvan LMSW on 06/07/21 6931 No Outcome (2) Grief heard and acknowledged, anxiety reduced, patient coping identified, patient/family expressed gratitude (Anticipatory Grief) Disciplines:  Interdisciplinary Expected end:  06/11/21 Patient/Family Education (Patient Education: Go to Patient Education Activity) Disciplines:  Interdisciplinary Expected end:  -   
  
 
  
  
  
  
  
 
 
___________________ Care Team Notes POC/IDG Notes [address] [place] [address] IDG Nurse Notes by Kieran Al RN at 06/07/21 1443  Version 1 of 1 Author: Kieran Al RN Service: NURSING Author Type: Registered Nurse Filed: 06/07/21 1443 Date of Service: 06/07/21 1443 Status: Signed : iKeran Al RN (Registered Nurse) Patient: Dawson Botello Date: 06/07/21 Time: 2:43 PM 
 
hospitals Nurse Notes Follow Up IDG: Pt is a [de-identified]year-old Female with myeloma who is here GIP level of care for management of delirium. Schneider with UOP of 1150ml IV access: Right PIV 
 PO intake: NPO Oxygen: 2/L Wounds: Left Thigh Wound / Mid lower Spine surgical incision PRN medications: Ativan 1mg x 2 doses for agitation / Morphine 2mg x 3 doses for pain Scheduled meds:  Haldol 2mg Q 12 hours / Morphine 2mg Q 6 hours / Senna 8.6mg Plan: Fentanyl 25mcg patch Q 72 hours for pain. D/C Scheduled morphine 2mg Q 6 hours. Add Roxinal 10mg Q 1hour PRN for pain. Change Haldol 2mg to P.O. Comprehensive plan of care reviewed. IDG and pt./family in agreement with plan of care. The IDG identifies through on-going assessment when a change is needed to the POC; the pt/family will receive care and services necessitated by changes in POC.  Medications reviewed by the pharmacist and Medical Director. Signed by: Suly Piper RN 
 
  
[facility] IDG  Notes by Doreen Bruce LMSW at 06/07/21 1232  Version 1 of 1 Author: Doreen Bruce LMSW Service: Gaurang Philip Author Type: Licensed Masters in Social Work Filed: 06/07/21 1242 Date of Service: 06/07/21 1232 Status: Signed : Doreen Bruce, [facility] (Licensed Masters in Social Work) Patient: Loli Bush Date: 06/07/21 Time: 12:32 PM 
 
[facility]  Notes Pt remains under GIP level of care. No changes in the plan of care. SW will continue to provide ongoing emotional support and assessment of psychosocial and bereavement concerns, as well as needs until discharge. Signed by: Opal Torres LMSW [facility] IDG Volunteer Notes by Shelia Carlson at 06/07/21 1231  Version 1 of 1 Author: Shelia Carlson Service: Hospice and Palliative Care Author Type: Hospice Volunteer/ Filed: 06/07/21 1237 Date of Service: 06/07/21 1231 Status: Signed : Shelia Carlson [facility] Volunteer/) Patient: Loli Bush Date: 06/07/21 Time: 12:31 PM 
 
[facility] Volunteer Notes Volunteer  Hospice Interdisciplinary Plan of Care Review Status Codes  C=Continued R=Revised RS = Resolved NV= No visits per Infection Control Policy or family request 
  
C. Volunteer Goal: Hospice house volunteer (s) enhances the quality of remaining life while patient is at the hospice house. Pt will continue to receive general support by volunteers as evidenced by comfort bag delivery, snack cart, supplies to the room, companionship visits, pet therapy and/or therapeutic music. Any other special requests or information for volunteer:  
    
 
 
 
 
Signed by: Araceli Valle [facility] IDG Nurse Notes by Flavia Garay RN at 06/04/21 3471  Version 1 of 1  Author: Flavia Garay RN Service: NURSING Author Type: Registered Nurse Filed: 06/04/21 1346 Date of Service: 06/04/21 1344 Status: Signed : Flavia Garay RN (Registered Nurse) Patient: Loli Bush Date: 06/04/21 Time: 1:44 PM 
 
[facility] Nurse Notes 1st IDG: Pt is a [de-identified]year-old Female with myloma who is here GIP level of care for management of delirium. Schneider with UOP of 825ml IV access: PIV Right FA 
 PO intake: Bites and Sips Oxygen: Room AIr Wounds: Left thigh / Mid Spine surgical site PRN medications: Haldol 2mg BID x 2 doses for agitation / Morphine 2mg x 4 doses for pain Scheduled meds: Haldol 2mg Q 12 hours / Morphine 2mg Q 6 hours / Senna 8.6mg BID Plan: D/C scheduled Haldol. Comprehensive plan of care reviewed. IDG and pt./family in agreement with plan of care. The IDG identifies through on-going assessment when a change is needed to the POC; the pt/family will receive care and services necessitated by changes in POC. Medications reviewed by the pharmacist and Medical Director. Signed by: Suly Piper, JARRETT 
 
  
[facility] IDG Volunteer Notes by Shelia Carlson at 06/04/21 1140  Version 1 of 1 Author: Shelia Carlson Service: Hospice and Palliative Care Author Type: Hospice Volunteer/ Filed: 06/04/21 1202 Date of Service: 06/04/21 1140 Status: Signed : Shelia Carlson [facility] Volunteer/) Patient: Loli Bush Date: 06/04/21 Time: 11:40 AM 
 
[facility]osp[facility] House IDG Note: (to be used at first IDG on patient) Volunteer  Hospice Interdisciplinary Plan of Care Review Status Codes I = Initiated   NV= No visits per Infection Control Policy or family request 
  
I. Volunteer Goal: Hospice house volunteer (s) enhances the quality of remaining life while patient is at the hospice house. Interventions: Dyana Kate Volunteer (s) will provide companionship to the patient and/or family by visiting at the hospice house Sheryl Juanpablo Sheryl Shenmiguelina Esa Volunteer (s) will provide respite as needed when requested by patient and/or family. Sheryl Verde  Volunteer will provide activities such as music, reading, pet therapy, etc. as requested. Sheryl Verde  Comfort bag delivered. Any other special requests or information regarding volunteer services: 
 Staff have requested visits for companionship and volunteers have been notified by this Kezia 22. No further needs identified at this time. [facility] IDG  Notes by Juan Lundy at 06/04/21 1142  Version 1 of 1 Author: Juan Lundy Service: Spiritual Care Author Type: Pastoral Care Filed: 06/04/21 1144 Date of Service: 06/04/21 1142 Status: Signed : Juan Lundy (Pastoral Care) Patient: Dawson Botello Date: 06/04/21 Time: 11:42 AM 
 
[facility]  Notes Assessment pending for spiritual and bereavement care. Signed by: Barber Pal [address] [place] [address] ID  Notes by Candace Barajas LMSW at 06/04/21 1138  Version 1 of 1 Author: Candace Barajas LMSW Service: Licensed Clinical  Author Type:  Filed: 06/04/21 1139 Date of Service: 06/04/21 1138 Status: Signed : Candace Barajas LMSW () JUSTA has read the initial comprehensive assessment and plan of care and acknowledges no urgent needs and is in agreement with plan SW to assess coping and needs each visit and offer availability. [facility]  Notes by Juan Lundy at 06/04/21 6361  Version 1 of 1 Author: Juan Lundy Service: Spiritual Care Author Type: Pastoral Care Filed: 06/04/21 0928 Date of Service: 06/04/21 6131 Status: Signed : Juan Lundy (Pastoral Care) Patient: Dawson Botello Date: 06/04/21 Time: 9:27 AM 
 
[facility]  Notes / Grief Counselor has reviewed  Initial Comprehensive Assessment and plan of care.  Bereavement and Spiritual Care Assessments to be completed and plan of care put in place to meet the needs of patient and familly. Signed by: Gurpreet Carrillo [address] ID Nurse Notes by Corie Pike RN at 06/03/21 1944  Version 1 of 1 Author: Corie Pike RN Service: NURSING Author Type: Registered Nurse Filed: 06/03/21 1944 Date of Service: 06/03/21 1944 Status: Signed : Corie Pike RN (Registered Nurse) Patient: Gunjan Pisano Date: 06/03/21 Time: 7:44 PM 
 
[facility] Nurse Notes German Hickey is an [de-identified]year old female admitted to room 106 for hospice diagnosis of multiple myeloma with mets to the bone. Level of care is general inpatient for management of pain, agitation and delirium. Pt is alert and able to answer simple yes and no questions. She is able to state her name and date of birth and present time. She denies pain and shortness of breath. Physical assessment completed. Lung sounds clear but diminished in bases, heart sounds regular, abdomen soft with extensive ecchymosis to lower quadrants, pt has active bowel sounds, pt has dressing to left thigh with oozing serosanguinous drainage, changed dressing and applied 4 x 4, and two abd dressings wrapped with kerlix. Extremities warm with palpable pulses. Sacrum and bilateral heels with blanchable redness. Report from hospital nurse indicates pt has had periods of agitation and confusion alternating with periods of somnolence. Explained procedure for placing cook. 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 Placed #16 cook catheter with over 500 cc output of clear yellow urine. Pt tolerated without moaning or complaint.  
  
1619 pt's son, Sunita Friend and daughter in law, [place] at bedside. Discussed hospice, plan of care, family situation. They state they are on board with hospice, they want her to be comfortable. Sunita Friend states pt was living alone and caring for herself prior to a couple of falls which led to the diagnosis of multiple myeloma.  They state she became very agitated and confused in the hospital. Mia Thompson states he has not left her side for days. Gave them the blue book oriented them to room and hospice. Asked them if they had any questions. Administered scheduled medication after explaining prn medication.   
1749 pt resting quietly with snoring. Not grimacing, groaning or agitation. Held senna due to pt sleeping 
  
 
 
 
Signed by: Parrish Amador RN Care Team Present:  
Team Members Present: Physician, Nurse, , , Vol Coordinator Physician Team Member: Dr. Cherrie Vasquez Nurse Team Member: Brannon Carmne Social Work Team Member: Holland Penningtonlain Team Member: Kenyatta Mccullough Vol Coordinator: Lupe Mclaughlin

## 2021-06-07 NOTE — PROGRESS NOTES
Problem: Falls - Risk of 
Goal: *Absence of Falls Description: Document Yinka Carbajal Fall Risk and appropriate interventions in the flowsheet. Outcome: Progressing Towards Goal 
Note: Fall Risk Interventions: 
Mobility Interventions: Bed/chair exit alarm Mentation Interventions: Adequate sleep, hydration, pain control, Bed/chair exit alarm, Door open when patient unattended, Evaluate medications/consider consulting pharmacy, Room close to nurse's station, Reorient patient Medication Interventions: Bed/chair exit alarm, Evaluate medications/consider consulting pharmacy Elimination Interventions: Bed/chair exit alarm, Call light in reach History of Falls Interventions: Bed/chair exit alarm, Door open when patient unattended, Room close to nurse's station, Evaluate medications/consider consulting pharmacy Problem: Pain Goal: Verbalize satisfaction of level of comfort and symptom control Outcome: Progressing Towards Goal 
  
Problem: Anxiety/Agitation Goal: Verbalize and demonstrate ability to manage anxiety Description: The patient/family/caregiver will verbalize and demonstrate ability to manage the patient's anxiety throughout hospice care. Outcome: Progressing Towards Goal 
  
Problem: Infection - Risk of, Urinary Catheter-Associated Urinary Tract Infection Goal: *Absence of infection signs and symptoms Outcome: Progressing Towards Goal 
  
Problem: Pressure Injury - Risk of 
Goal: *Prevention of pressure injury Description: Document Isaac Scale and appropriate interventions in the flowsheet. Outcome: Progressing Towards Goal 
Note: Pressure Injury Interventions: 
Sensory Interventions: Assess changes in LOC, Float heels, Minimize linen layers, Keep linens dry and wrinkle-free, Check visual cues for pain, Pad between skin to skin Moisture Interventions: Absorbent underpads, Maintain skin hydration (lotion/cream), Minimize layers, Apply protective barrier, creams and emollients, Moisture barrier, Internal/External urinary devices, Limit adult briefs Activity Interventions: Pressure redistribution bed/mattress(bed type) Mobility Interventions: Pressure redistribution bed/mattress (bed type), Float heels Nutrition Interventions: Document food/fluid/supplement intake Friction and Shear Interventions: Apply protective barrier, creams and emollients, Minimize layers, Lift sheet

## 2021-06-07 NOTE — PROGRESS NOTES
0700- Report received, patient resting quietly in bed with no s/sx pain or distress. Call light within reach. Bed is low and locked, tab alert in place, and door left open for continuous monitoring. Patient Is currently under GIP loc. Will continue to assess need for change in LOC and continue to collaborate with IDG team regarding d/c planning. 0207- patient is awake and pleasantly confused. She is moving her arm ans wincing as if she is in pain. Prn morphine given for pain. flacc 6/10. dsg to L thigh has fallen off. No s./sx of bleeding. Will leave vonnie 
 
0740- flacc 0/10. Symptoms resolved. 9943 patient awake in bed talking to herself. Scheduled morphine given and prn ativan given for restlessness. Patient turned to L side for comfort. 1045- patient has settled and is snoring. Symptoms have been resolved. 1259- scheduled fentanyl patch placed and family updated on medication changes. Patient continuing to snore. Will hold pt haldol at this time. 1603- Patient resting with eyes closed. No s/sx of pain or distress. 1658- patient is awake and restless. Prn roxanol given for s/sx of pain and scheduled haldol given at this time. Patient tolerates meds crushed in pudding. flacc 8/10.  
 
1702- scheduled senna given crushed in pudding. 1800- Patient resting with eyes closed. No s/sx of pain or distress.   flacc 0/10. 
 
1845- report given to Shavon Singh

## 2021-06-07 NOTE — PROGRESS NOTES
1830:  Patient report from Luiza Toledo RN. Patient ID by name and . Patient is here for [facility] LOC with hospice diagnosis of multiple myeloma with symptoms to be managed of pain and agitation. Patient in bed resting with eyes closed. No s/sx of pain, dyspnea, or agitation observed. FLACC 0/10. Fentanyl patch visualized and is c/d/i.  RN has reviewed POC with CNA. Patient will remain in [facility] under [facility] LOC until demise. Will continue to evaluate patient discharge plan for potential change of LOC. Bed low and locked and all safety measures in place. Door open. :  Peripheral IV flushed as ordered. Physical assessment complete and documented in flowsheets. Patient continues to rest with no s/sx of pain, dyspnea, or agitation observed. FLACC 0/10. All safety measures remain in place. 2330:   Patient continues to rest with no s/sx of pain, dyspnea, or agitation observed. FLACC 0/10. All safety measures remain in place. 0130:  Patient continues to rest with no s/sx of pain, dyspnea, or agitation observed. FLACC 0/10. All safety measures remain in place. 0227:  Patient heard moaning out. Patient noted to be grimacing. Asked patient if she was in pain and she stated yes. Patient pointing to her right shoulder. Unable to rate pain. FLACC 4/10. Medicated with PRN Roxanol 10 mg. Will reassess. 0310:  Reassessment:  Patient resting with no s/sx of pain observed. FLACC 0/10. 
 
0500:  Patient continues to rest with no s/sx of pain, dyspnea, or agitation observed. FLACC 0/10. All safety measures remain in place. 3089:  Patient moaning out and noted to be grimacing. FLACC 4/10. Medicated with PRN Roxanol 10 mg for pain. Will have first shift to reassess. Patient has required two PRN doses of Roxanol for pain this shift. Report to Sharon Gill RN.

## 2021-06-07 NOTE — [facility] IDG NURSE NOTES
Patient: Paulino Soto Date: 06/07/21 Time: 2:43 PM 
 
\[facility]\"" Nurse Notes Follow Up IDG: Pt is a [de-identified]year-old Female with myeloma who is here GIP level of care for management of delirium. Schneider with UOP of 1150ml IV access: Right PIV 
 PO intake: NPO Oxygen: 2/L Wounds: Left Thigh Wound / Mid lower Spine surgical incision PRN medications: Ativan 1mg x 2 doses for agitation / Morphine 2mg x 3 doses for pain Scheduled meds:  Haldol 2mg Q 12 hours / Morphine 2mg Q 6 hours / Senna 8.6mg Plan: Fentanyl 25mcg patch Q 72 hours for pain. D/C Scheduled morphine 2mg Q 6 hours. Add Roxinal 10mg Q 1hour PRN for pain. Change Haldol 2mg to P.O. Comprehensive plan of care reviewed. IDG and pt./family in agreement with plan of care. The IDG identifies through on-going assessment when a change is needed to the POC; the pt/family will receive care and services necessitated by changes in POC. Medications reviewed by the pharmacist and Medical Director.  
 
 
Signed by: Mary Sheriff RN

## 2021-06-07 NOTE — [facility] IDG SOCIAL WORKER NOTES
Patient: Janiya Kimble Date: 06/07/21 Time: 12:32 PM 
 
[facility]  Notes Pt remains under GIP level of care. No changes in the plan of care. SW will continue to provide ongoing emotional support and assessment of psychosocial and bereavement concerns, as well as needs until discharge. Signed by: Gokul Khan LMSW

## 2021-06-07 NOTE — [facility] IDG VOLUNTEER NOTES
Patient: Ashlee London Date: 06/07/21 Time: 12:31 PM 
 
[facility] Volunteer Notes Volunteer  Hospice Interdisciplinary Plan of Care Review Status Codes  C=Continued R=Revised RS = Resolved NV= No visits per Infection Control Policy or family request 
  
C. Volunteer Goal: Hospice house volunteer (s) enhances the quality of remaining life while patient is at the hospice house. Pt will continue to receive general support by volunteers as evidenced by comfort bag delivery, snack cart, supplies to the room, companionship visits, pet therapy and/or therapeutic music. Any other special requests or information for volunteer:  
    
 
 
 
 
Signed by: Sarahy Wade

## 2021-06-07 NOTE — PROGRESS NOTES
SW spoke with pt's son, Tay Monroy and daughter-in-law about pt probably changing LOC tomorrow (06/08/2021). Discussions were around placement for pt and Tay Monroy reported the family will likely take pt home with them. SW explained the in home hospice services and provided family with a list of caregiver agencies. SW encouraged family to reach out to SW with any questions or any needs.

## 2021-06-08 NOTE — H&P
H OPI: Elena Hanson is an 80-year-old woman found to have her principal diagnosis of multiple myeloma (kappa light chain) when she presented to [facility] 5/28/2021 with diffuse pelvic and spinal pain.  Family also described altered mental state for this previously independent but now severely debilitated octogenarian. Amanda Boone was diagnosed as the principal cause of her metabolic encephalopathy.  This failed to improve after administration of Zometa had corrected her hypercalcemia.  An E. coli urinary tract infection was a secondary contributing factor which was also addressed with appropriate intravenous antibiotics. Radiologic survey revealed multiple lytic lesions in the calvarium and lytic lesion in left femoral diaphysis. 
  
She underwent SPEP with small M-spike of 0.28, IgA kappa band. FLC normal with normal ratio and UPEP unremarkable. She underwent BMBx that showed marrow cellularity of >90% diffusely infiltrated with plasma cells Past medical history: Stable angina, stable type 2 diabetes mellitus. Family history: Negative for hematologic malignancy Social history: Patient was  living independently prior to this episode. Review of systems: Patient has poor recall of previous hours and days. She reports back pain hip pain and extreme fatigue. Exam: Patient continued to show marked agitation and delirium requiring parenteral Haldol for patient safety and comfort.  The patient is not likely to endanger self or others. 
 
 
  
  
Impression and plan: Her son as responsible party has chosen to forego disease modifying therapy for the multiple myeloma and to limit further care to comfort measures only. Lee Ted the circumstances Ms. Ingrid Keenan life expectancy is less than a few weeks.  In the interim she will continue to require parenteral psychotropic medication for delirium and parenteral opioid for severe bone pain.  Once a stable oral regimen of analgesic and antipsychotic medication is achieved, appropriate place for the balance of the patient's remaining lifetime will be determined. . 
  
As of 6/7/2021 efforts to titrate opioid analgesics to a level sufficient to control the patient's pain with initiation of fentanyl 12.5 MCG per minute hand improved the patient's comfort but she is still requiring frequent doses of parenteral morphine for intermittent sharp pain particularly with body care. We have uptitrated fentanyl to 25 MCG per . However the patient's obtundation has deepened since her admission to [facility] 6/4/2021. Her intake of food and fluids is insufficient to sustain homeostasis. She continues under GIP status. I have down regulated her haloperidol from every 6 to every 12 and subsequently to as needed only. Her caregiving son and daughter-in-law have reported an inability to manage full-time care at home under the present circumstances and they would prefer to see Mrs. Etta Mercado live out of her remaining weeks at [facility]

## 2021-06-08 NOTE — PROGRESS NOTES
Problem: Falls - Risk of 
Goal: *Absence of Falls Description: Document Freddy Rodriguez Fall Risk and appropriate interventions in the flowsheet. Outcome: Progressing Towards Goal 
Note: Fall Risk Interventions: 
Mobility Interventions: Bed/chair exit alarm Mentation Interventions: Adequate sleep, hydration, pain control, Bed/chair exit alarm, Door open when patient unattended, Evaluate medications/consider consulting pharmacy, Room close to nurse's station, Reorient patient Medication Interventions: Bed/chair exit alarm, Evaluate medications/consider consulting pharmacy Elimination Interventions: Bed/chair exit alarm, Call light in reach History of Falls Interventions: Bed/chair exit alarm, Door open when patient unattended, Room close to nurse's station, Evaluate medications/consider consulting pharmacy Problem: Pain Goal: Verbalize satisfaction of level of comfort and symptom control Outcome: Progressing Towards Goal 
  
Problem: Anxiety/Agitation Goal: Verbalize and demonstrate ability to manage anxiety Description: The patient/family/caregiver will verbalize and demonstrate ability to manage the patient's anxiety throughout hospice care. Outcome: Progressing Towards Goal 
  
Problem: Infection - Risk of, Urinary Catheter-Associated Urinary Tract Infection Goal: *Absence of infection signs and symptoms Outcome: Progressing Towards Goal 
  
Problem: Pressure Injury - Risk of 
Goal: *Prevention of pressure injury Description: Document Isaac Scale and appropriate interventions in the flowsheet. Outcome: Progressing Towards Goal 
Note: Pressure Injury Interventions: 
Sensory Interventions: Assess changes in LOC, Float heels, Minimize linen layers, Keep linens dry and wrinkle-free, Check visual cues for pain, Pad between skin to skin Moisture Interventions: Absorbent underpads, Maintain skin hydration (lotion/cream), Minimize layers, Apply protective barrier, creams and emollients, Moisture barrier, Internal/External urinary devices, Limit adult briefs Activity Interventions: Pressure redistribution bed/mattress(bed type) Mobility Interventions: Pressure redistribution bed/mattress (bed type), Float heels Nutrition Interventions: Document food/fluid/supplement intake Friction and Shear Interventions: Apply protective barrier, creams and emollients, Minimize layers, Lift sheet

## 2021-06-08 NOTE — PROGRESS NOTES
0630: Report received from off going RN. Pt admitted for [facility] level of care with hospice diagnosis of multiple myeloma with symptoms to be managed of pain and agitation. Patient in bed resting with eyes closed. No s/sx of pain, dyspnea, or agitation observed. FLACC 0/10. Fentanyl patch 25mcg/hr visualized to left chest.   RN has reviewed POC with CNA. Patient will remain in [facility] under [facility] LOC until demise. Will continue to evaluate patient discharge plan for potential change of LOC. Bed low and locked and all safety measures in place. Door open. 
  
5353: Attempted to flush IV. IV now interstitial so removed. Pt opens eyes but no verbal response. Has sonorous respirations. No pain or discomfort observed. FLACC score is 0/10. Senna held as pt is too lethargic to swallow safely and does not follow commands. 1015: Pt continues to rest in bed with sonorous non-labored respirations. No s/sx of pain or other discomfort observed. FLACC score 0/10.  
 
1200: Son and daughter in aw arrived to the room. Update given. Pt responding to son. No s/sx of agitation or pin observed. FLACC score remains 0/10.  
 
1347: Pt with increased agitation and facial grimacing. Scheduled Haldol dissolved in a small amount of water and administered via syringe as pt unable to swallow. Morphine Sulfate 10mg SL administered at this time to promote comfort. Mouth care given. Pt with delayed swallow and coughing with the little amount of liquid medications placed in her mouth. 1415: Therapy dog in room. Son and daughter in law in room. Pt resting with eyes closed, resps regular and non-labored. FLACC score now 0/10.  
 
1730: Unable to administer Senna tablet. Pt arouses but does not follow commands or verbalize needs. Pt resting with sonorous respirations, no s/sx of pain or other discomfort observed. FLACC score 0/10. Son and daughter in law remain at bedside. 1845: Report given to oncoming RN.

## 2021-06-08 NOTE — PROGRESS NOTES
Progress Note Patient: Nic Walls MRN: [id number]  SSN: [id number] YOB: 1940  Age: [de-identified] y.o. Sex: female Admit Date: 6/3/2021 LOS: 5 days Clinical Summary: Ms. Etta Mercado has been having some pain though she is obtunded majority of the time. She has just been medicated and is snoring loudly. There is mild pulmonary congestion without respiratory distress and her heart rhythm is regular. Vitals:  
 06/06/21 2313 06/06/21 1645 06/07/21 0439 06/07/21 1637 BP: 104/69 (!) 118/59 133/62 (!) 123/59 Pulse: (!) 107 (!) 113 (!) 107 (!) 104 Resp: 16 16 16 12 Temp: 99 °F (37.2 °C) 99.9 °F (37.7 °C) 98.9 °F (37.2 °C) 99.6 °F (37.6 °C) SpO2:      
  
 
 
 
Clinical Assessment:  
 
Active Problems: 
  Cancer associated pain (6/3/2021) Stable angina ([address] (2/16/2010) Diabetes mellitus type II, controlled ([address] (2/16/2010) Hypercalcemia (5/21/2021) Acute encephalopathy (5/22/2021) Acute renal failure with tubular necrosis ([address] (5/22/2021) Mild protein-calorie malnutrition ([address] (5/28/2021) Multiple myeloma ([address] (5/29/2021) TERA (generalized anxiety disorder) (6/9/2020) Hypotension (7/31/2018) Overview: Last Assessment & Plan:  
    Formatting of this note might be different from the original. 
    B/p noted to be low. Possible orthostatic hypotension episode vs vaso  
    vagal. Would DC Zestoretic. She states her PCP was going to DC if she lost  
    weight and she has. She has been encouraged to stay hydrated. She was  
    given IVFs here in ER. Osteoarthritis of knee (6/3/2021) Coronary artery disease involving native coronary artery of native heart without angina pectoris (7/31/2018) Overview: Formatting of this note might be different from the original. 
    Pt had stent placed > 10 years ago in [facility]. Not followed by  
    cardiology presently. Last Assessment & Plan: Formatting of this note might be different from the original. 
    Pt denies any recent or current CP/anginal complaints. Her EKG had some  
    artifact and resultant questionable ST changes. We will repeat this. She  
    did have an echo which showed no wall motion abnormalities and NL EF. Her  
    initial trop was negative. She is agreeable to staying for 3 hour troponin  
    and serial EKG. If negative she can be DC'd from a cardiac standpoint. She  
    would like to follow up with her PCP and then be referred to cardiology in  
    [place] if needed. She does take aspirin and statin daily which should  
    be continued. Treatment Plan:  
 
 I have reviewed the patient's Plan of Care with the nursing staff. She is stable currently with appropriate parenteral medications. I would anticipate death within the week. Current Facility-Administered Medications Medication Dose Route Frequency  haloperidoL (HALDOL) tablet 2 mg  2 mg Oral Q12H  
 fentaNYL (DURAGESIC) 25 mcg/hr patch 1 Patch  1 Patch TransDERmal Q72H  morphine (ROXANOL) concentrated oral syringe 10 mg  10 mg Oral Q1H PRN  
 LORazepam (ATIVAN) tablet 1 mg  1 mg Oral Q4H PRN  
 diphenhydrAMINE (BENADRYL) injection 25 mg  25 mg IntraVENous Q6H PRN  
 acetaminophen (TYLENOL) tablet 650 mg  650 mg Oral Q4H PRN  
 LORazepam (ATIVAN) tablet 1 mg  1 mg Oral Q4H PRN  
 senna (SENOKOT) tablet 8.6 mg  1 Tablet Oral BID  alum-mag hydroxide-simeth (MYLANTA) oral suspension 30 mL  30 mL Oral QID PRN  
 temazepam (RESTORIL) capsule 15 mg  15 mg Oral QHS PRN  
 glycopyrrolate (ROBINUL) injection 0.2 mg  0.2 mg SubCUTAneous Q4H PRN Signed By: Clarisa Petty MD   
 June 8, 2021

## 2021-06-09 NOTE — PROGRESS NOTES
1900 Walking rounds completed with Waldo Marcelino RN. Pt identified by name and . Pt admitted under [facility] care on 6/3/2021 with a hospice diagnosis of multiple myeloma with mets to the bone. Pt will arouse to voice; periods of confusion noted. Complete care. Pt currently on clear liquid diet. Schneider catheter placed on 2021. Fentanyl 25mcg patch to left chest in place and verified. Pt resting in bed with eyes closed; RR even and non labored. No signs of pain or distress noted. No signs of NVD or SOB. Bed in lowest and locked position with all safety measures in place. FLACC 0/10.  
 
2104 Pt continues to rest quietly in bed with non labored respirations. No signs of pain or distress noted. No s/sx of NVD or SOB. FLACC 0/10. 
 
2322 Pt resting quietly in bed with eyes closed; no signs of pain or distress noted. RR even and non labored. No signs of NVD or SOB. FLACC 0/10. Pt repositioned for comfort and skin integrity. 0008 Pt noted moaning out loudly. PRN Morphine given to manage pain. RR even and non labored. No signs of NVD or SOB. FLACC 7/10. 
 
0045 Pt resting peacefully in bed with eyes closed; no signs of pain or distress noted. RR even and non labored. No signs of NVD or SOB. FLACC 0/10.  
 
3183 Scheduled medication given per orders. Pt resting peacefully in bed with eyes closed; no signs of pain or distress noted. RR even and non labored. No signs of NVD or SOB. FLACC 0/10. 
 
0202 Pt resting calmly in bed with eyes closed; no signs of pain or distress noted. RR even and non labored. No signs of NVD or SOB. FLACC 0/10. Pt pulled up in bed. 
 
0453 Pt awake in bed pulling off gown and throwing blankets on the floor. PRN Morphine and Lorazepam given to manage pain and agitation. RR even and non labored. No signs of NVD or SOB. FLACC 7/10. 
 
0532  Pt resting calmly in bed with eyes closed; no signs of pain or distress noted. Medication therapy effective. RR even and non labored.  No signs of NVD or SOB. FLACC 0/10.   
 
Report given to Melanie Morelos RN.

## 2021-06-09 NOTE — PROGRESS NOTES
0700- Report received, patient resting quietly in bed with no s/sx pain or distress. Call light within reach. Bed is low and locked, tab alert in place, and door left open for continuous monitoring. 0800- patient assessed. Did not awaken to touch. She appears comfortable and is snoring. 2135- patient continues to snore. No s/sx of pain or distress. Holding senna. 1039- Patient resting with eyes closed. No s/sx of pain or distress. 1240- patient is awakening and appears uncomfortable. scheduled haldol administered as well as prn roxanol. For pain. Patient turned to R side. Patient took sips of apple juice and a bite of jello. 1330- Patient resting with eyes closed. No s/sx of pain or distress. flacc 0/10.  
 
1540- Patient resting with eyes closed. No s/sx of pain or distress. Son, rajat and his wife at bedside. 1750 - scheduled fentanyl patch changed. Increased to 50 mcg per orders. Fentanyl 25 mcg wasted with Javed Rubio RN. Patient continuing to snore.   
 
Report given to Stevenson Kelly

## 2021-06-09 NOTE — PROGRESS NOTES
Problem: Falls - Risk of 
Goal: *Absence of Falls Description: Document Rhonda Howe Fall Risk and appropriate interventions in the flowsheet. Outcome: Progressing Towards Goal 
Note: Fall Risk Interventions: 
Mobility Interventions: Bed/chair exit alarm Mentation Interventions: Adequate sleep, hydration, pain control, Bed/chair exit alarm, Door open when patient unattended Medication Interventions: Bed/chair exit alarm Elimination Interventions: Bed/chair exit alarm, Call light in reach History of Falls Interventions: Bed/chair exit alarm, Door open when patient unattended Problem: Patient Education: Go to Patient Education Activity Goal: Patient/Family Education Outcome: Progressing Towards Goal 
  
Problem: Hospice Orientation Goal: Demonstrate understanding of hospice philosophy, plan of care, and home hospice program 
Description: The patient/family/caregiver will demonstrate understanding of hospice philosophy, plan of care and the home hospice program as evidenced by participation in meeting the patient's psychosocial, spiritual, medical, and physical needs inclusive of medical supplies/equipment focusing on symptoms. Outcome: Progressing Towards Goal 
  
Problem: Pain Goal: Verbalize satisfaction of level of comfort and symptom control Outcome: Progressing Towards Goal 
  
Problem: Anxiety/Agitation Goal: Verbalize and demonstrate ability to manage anxiety Description: The patient/family/caregiver will verbalize and demonstrate ability to manage the patient's anxiety throughout hospice care. Outcome: Progressing Towards Goal 
  
Problem: End of Life Process Goal: Demonstrate understanding of end of life processes Description: Patient/caregiver will understand end of life processes. Outcome: Progressing Towards Goal 
  
Problem: Anticipatory Grief Goal: Grief heard and acknowledged, anxiety reduced, patient coping identified, patient/family expressed gratitude Outcome: Progressing Towards Goal 
  
Problem: Infection - Risk of, Urinary Catheter-Associated Urinary Tract Infection Goal: *Absence of infection signs and symptoms Outcome: Progressing Towards Goal 
  
Problem: Pressure Injury - Risk of 
Goal: *Prevention of pressure injury Description: Document Isaac Scale and appropriate interventions in the flowsheet. Outcome: Progressing Towards Goal 
Note: Pressure Injury Interventions: 
Sensory Interventions: Assess changes in LOC, Float heels, Keep linens dry and wrinkle-free, Minimize linen layers, Pressure redistribution bed/mattress (bed type) Moisture Interventions: Absorbent underpads, Maintain skin hydration (lotion/cream), Minimize layers Activity Interventions: Assess need for specialty bed, Pressure redistribution bed/mattress(bed type) Mobility Interventions: Float heels, HOB 30 degrees or less, Pressure redistribution bed/mattress (bed type) Nutrition Interventions: Document food/fluid/supplement intake Friction and Shear Interventions: Apply protective barrier, creams and emollients, HOB 30 degrees or less, Minimize layers Problem: Patient Education: Go to Patient Education Activity Goal: Patient/Family Education Outcome: Progressing Towards Goal 
  
Problem: Emotional Support Needs Goal: Patient/family is receiving emotional support Description: Pt, Kamilah Gibbs, and family will receive emotional support each week through the use of education on the hospice experience, weekly check-ins, and validation of feelings.   
Outcome: Progressing Towards Goal

## 2021-06-09 NOTE — PROGRESS NOTES
Problem: Falls - Risk of 
Goal: *Absence of Falls Description: Document Niharika Broad Fall Risk and appropriate interventions in the flowsheet. 6/9/2021 0322 by Simran Orozco Outcome: Progressing Towards Goal 
Note: Fall Risk Interventions: 
Mobility Interventions: Bed/chair exit alarm Mentation Interventions: Adequate sleep, hydration, pain control, Bed/chair exit alarm, Door open when patient unattended Medication Interventions: Bed/chair exit alarm Elimination Interventions: Bed/chair exit alarm, Call light in reach History of Falls Interventions: Bed/chair exit alarm, Door open when patient unattended 6/9/2021 0223 by Simran Orozco Outcome: Progressing Towards Goal 
Note: Fall Risk Interventions: 
Mobility Interventions: Bed/chair exit alarm Mentation Interventions: Adequate sleep, hydration, pain control, Bed/chair exit alarm, Door open when patient unattended Medication Interventions: Bed/chair exit alarm Elimination Interventions: Bed/chair exit alarm, Call light in reach History of Falls Interventions: Bed/chair exit alarm, Door open when patient unattended Problem: Patient Education: Go to Patient Education Activity Goal: Patient/Family Education 6/9/2021 0322 by Simran Orozco Outcome: Progressing Towards Goal 
6/9/2021 0223 by Simran Orozco Outcome: Progressing Towards Goal 
  
Problem: Hospice Orientation Goal: Demonstrate understanding of hospice philosophy, plan of care, and home hospice program 
Description: The patient/family/caregiver will demonstrate understanding of hospice philosophy, plan of care and the home hospice program as evidenced by participation in meeting the patient's psychosocial, spiritual, medical, and physical needs inclusive of medical supplies/equipment focusing on symptoms. 6/9/2021 0322 by Simran Orozco Outcome: Progressing Towards Goal 
6/9/2021 0223 by Simran Orozco Outcome: Progressing Towards Goal 
  
Problem: Pain Goal: Verbalize satisfaction of level of comfort and symptom control 6/9/2021 0322 by Mikey Rodriguez Outcome: Progressing Towards Goal 
6/9/2021 0223 by Mikey Rodriguez Outcome: Progressing Towards Goal 
  
Problem: Anxiety/Agitation Goal: Verbalize and demonstrate ability to manage anxiety Description: The patient/family/caregiver will verbalize and demonstrate ability to manage the patient's anxiety throughout hospice care. 6/9/2021 0322 by Mikey Rodriguez Outcome: Progressing Towards Goal 
6/9/2021 0223 by Mikey Rodriguez Outcome: Progressing Towards Goal 
  
Problem: End of Life Process Goal: Demonstrate understanding of end of life processes Description: Patient/caregiver will understand end of life processes. 6/9/2021 0322 by Mikey Rodriguez Outcome: Progressing Towards Goal 
6/9/2021 0223 by Mikey Rodriguez Outcome: Progressing Towards Goal 
  
Problem: Anticipatory Grief Goal: Grief heard and acknowledged, anxiety reduced, patient coping identified, patient/family expressed gratitude 6/9/2021 0322 by Mikey Rodriguez Outcome: Progressing Towards Goal 
6/9/2021 0223 by Mikey Rodriguez Outcome: Progressing Towards Goal 
  
Problem: Infection - Risk of, Urinary Catheter-Associated Urinary Tract Infection Goal: *Absence of infection signs and symptoms 6/9/2021 0322 by Mikey Rodriguez Outcome: Progressing Towards Goal 
6/9/2021 0223 by Mikey Rodriguez Outcome: Progressing Towards Goal 
  
Problem: Pressure Injury - Risk of 
Goal: *Prevention of pressure injury Description: Document Isaac Scale and appropriate interventions in the flowsheet. 6/9/2021 0322 by Mikey Rodriguez Outcome: Progressing Towards Goal 
Note: Pressure Injury Interventions: 
Sensory Interventions: Assess changes in LOC, Float heels, Keep linens dry and wrinkle-free, Minimize linen layers, Pressure redistribution bed/mattress (bed type) Moisture Interventions: Absorbent underpads, Maintain skin hydration (lotion/cream), Minimize layers Activity Interventions: Assess need for specialty bed, Pressure redistribution bed/mattress(bed type) Mobility Interventions: Float heels, HOB 30 degrees or less, Pressure redistribution bed/mattress (bed type) Nutrition Interventions: Document food/fluid/supplement intake Friction and Shear Interventions: Apply protective barrier, creams and emollients, HOB 30 degrees or less, Minimize layers 6/9/2021 0223 by Miguel Newby Outcome: Progressing Towards Goal 
Note: Pressure Injury Interventions: 
Sensory Interventions: Assess changes in LOC, Float heels, Keep linens dry and wrinkle-free, Minimize linen layers, Pressure redistribution bed/mattress (bed type) Moisture Interventions: Absorbent underpads, Maintain skin hydration (lotion/cream), Minimize layers Activity Interventions: Assess need for specialty bed, Pressure redistribution bed/mattress(bed type) Mobility Interventions: Float heels, HOB 30 degrees or less, Pressure redistribution bed/mattress (bed type) Nutrition Interventions: Document food/fluid/supplement intake Friction and Shear Interventions: Apply protective barrier, creams and emollients, HOB 30 degrees or less, Minimize layers Problem: Patient Education: Go to Patient Education Activity Goal: Patient/Family Education 6/9/2021 0322 by Miguel Newby Outcome: Progressing Towards Goal 
6/9/2021 0223 by Miguel Newby Outcome: Progressing Towards Goal 
  
Problem: Emotional Support Needs Goal: Patient/family is receiving emotional support Description: Pt, Johnnagonzález Edmonds, and family will receive emotional support each week through the use of education on the hospice experience, weekly check-ins, and validation of feelings. 6/9/2021 0322 by Miguel Newby Outcome: Progressing Towards Goal 
6/9/2021 0223 by Miguel Newby Outcome: Progressing Towards Goal

## 2021-06-10 NOTE — PROGRESS NOTES
1845:  Patient report from Luiza Toledo RN. Patient ID by name and . Patient is here for [facility] LOC with hospice diagnosis of multiple myeloma with bone mets. Patient resting with eyes closed. No s/sx of pain, dyspnea, or agitation observed. FLACC 0/10. Fentanyl patch visualized and is c/d/i.  RN has reviewed POC with CNA. [place] [facility] [place] [facility] LOC until demise. Will continue to evaluate patient discharge plan for potential change of LOC. Bed low and locked and all safety measures in place. Door open. 2130:  Physical assessment complete. Please see documentation in flowsheets. Patient continues to show no s/sx of pain, dyspnea, or agitation observed. FLACC 0/10.   
 
2330:  Patient continues to rest with no s/sx of pain, dyspnea, or agitation observed. FLACC 0/10.   
 
0040:  Scheduled Haldol 2 mg PO given as ordered crushed in water via syringe as patient is lethargic. Patient repositioned for comfort and pressure relief. No s/sx of pain observed. FLACC 0/10. 
 
0230:  Patient continues to rest with no s/sx of pain, dyspnea, or agitation observed. FLACC 0/10.   
 
0430:  VS taken and cook catheter emptied. 5306:  Patient repositioned for comfort. No s/sx of pain, dyspnea, or agitation observed. FLACC 0/10. Patient has not required any PRN medications this shift. Report to Sharon Gill RN.

## 2021-06-10 NOTE — PROGRESS NOTES
Progress Note Patient: Liang Avendano MRN: [id number]  SSN: [id number] YOB: 1940  Age: [de-identified] y.o. Sex: female Admit Date: 6/3/2021 LOS: 7 days Clinical Summary: Ms. Karol George is obtunded but appears to be comfortable. She has a modest amount of expiratory prolongation and wheezing but no signs of respiratory distress. She is also having a lot of snoring though this does not appear to be causing distress either. Vitals:  
 06/08/21 1610 06/09/21 0543 06/09/21 1500 06/10/21 0449 BP: 123/69 122/80 130/61 128/70 Pulse: (!) 105 (!) 102 (!) 105 (!) 114 Resp: 12 14 10 14 Temp: 97.8 °F (36.6 °C) 97.8 °F (36.6 °C) 98.1 °F (36.7 °C) 98.4 °F (36.9 °C) SpO2:      
  
 
 
 
Clinical Assessment:  
 
Active Problems: 
  Cancer associated pain (6/3/2021) Stable angina ([address] (2/16/2010) Diabetes mellitus type II, controlled ([address] (2/16/2010) Hypercalcemia (5/21/2021) Acute encephalopathy (5/22/2021) Acute renal failure with tubular necrosis ([address] (5/22/2021) Mild protein-calorie malnutrition ([address] (5/28/2021) Multiple myeloma ([address] (5/29/2021) TERA (generalized anxiety disorder) (6/9/2020) Hypotension (7/31/2018) Overview: Last Assessment & Plan:  
    Formatting of this note might be different from the original. 
    B/p noted to be low. Possible orthostatic hypotension episode vs vaso  
    vagal. Would DC Zestoretic. She states her PCP was going to DC if she lost  
    weight and she has. She has been encouraged to stay hydrated. She was  
    given IVFs here in ER. Osteoarthritis of knee (6/3/2021) Coronary artery disease involving native coronary artery of native heart without angina pectoris (7/31/2018) Overview: Formatting of this note might be different from the original. 
    Pt had stent placed > 10 years ago in [place], [place]. Not followed by  
    cardiology presently.   
     
    Last Assessment & Plan:  
    Formatting of this note might be different from the original. 
    Pt denies any recent or current CP/anginal complaints. Her EKG had some  
    artifact and resultant questionable ST changes. We will repeat this. She  
    did have an echo which showed no wall motion abnormalities and NL EF. Her  
    initial trop was negative. She is agreeable to staying for 3 hour troponin  
    and serial EKG. If negative she can be DC'd from a cardiac standpoint. She  
    would like to follow up with her PCP and then be referred to cardiology in  
    [place] if needed. She does take aspirin and statin daily which should  
    be continued. Treatment Plan:  
 
 I have reviewed the patient's Plan of Care with the nursing staff. We will leave an order for duo nebs as needed although she is does not need them without signs of respiratory distress supervening her bronchospasm. Death is likely in the next week. Current Facility-Administered Medications Medication Dose Route Frequency  fentaNYL (DURAGESIC) 50 mcg/hr patch 1 Patch  1 Patch TransDERmal Q72H  
 haloperidoL (HALDOL) tablet 2 mg  2 mg Oral Q12H  
 morphine (ROXANOL) concentrated oral syringe 10 mg  10 mg Oral Q1H PRN  
 LORazepam (ATIVAN) tablet 1 mg  1 mg Oral Q4H PRN  
 diphenhydrAMINE (BENADRYL) injection 25 mg  25 mg IntraVENous Q6H PRN  
 acetaminophen (TYLENOL) tablet 650 mg  650 mg Oral Q4H PRN  
 LORazepam (ATIVAN) tablet 1 mg  1 mg Oral Q4H PRN  
 senna (SENOKOT) tablet 8.6 mg  1 Tablet Oral BID  alum-mag hydroxide-simeth (MYLANTA) oral suspension 30 mL  30 mL Oral QID PRN  
 temazepam (RESTORIL) capsule 15 mg  15 mg Oral QHS PRN  
 glycopyrrolate (ROBINUL) injection 0.2 mg  0.2 mg SubCUTAneous Q4H PRN Signed By: Bong Solorio MD   
 Serene 10, 2021

## 2021-06-10 NOTE — PROGRESS NOTES
Problem: Falls - Risk of 
Goal: *Absence of Falls Description: Document Aaron Cool Fall Risk and appropriate interventions in the flowsheet. Outcome: Progressing Towards Goal 
Note: Fall Risk Interventions: 
Mobility Interventions: Bed/chair exit alarm Mentation Interventions: Adequate sleep, hydration, pain control, Bed/chair exit alarm, Door open when patient unattended Medication Interventions: Bed/chair exit alarm Elimination Interventions: Bed/chair exit alarm, Call light in reach History of Falls Interventions: Bed/chair exit alarm, Door open when patient unattended Problem: Pain Goal: Verbalize satisfaction of level of comfort and symptom control Outcome: Progressing Towards Goal 
  
Problem: Anxiety/Agitation Goal: Verbalize and demonstrate ability to manage anxiety Description: The patient/family/caregiver will verbalize and demonstrate ability to manage the patient's anxiety throughout hospice care. Outcome: Progressing Towards Goal 
  
Problem: Infection - Risk of, Urinary Catheter-Associated Urinary Tract Infection Goal: *Absence of infection signs and symptoms Outcome: Progressing Towards Goal 
  
Problem: Pressure Injury - Risk of 
Goal: *Prevention of pressure injury Description: Document Isaac Scale and appropriate interventions in the flowsheet. Outcome: Progressing Towards Goal 
Note: Pressure Injury Interventions: 
Sensory Interventions: Assess changes in LOC, Float heels, Keep linens dry and wrinkle-free, Minimize linen layers, Pressure redistribution bed/mattress (bed type) Moisture Interventions: Absorbent underpads, Maintain skin hydration (lotion/cream), Minimize layers Activity Interventions: Assess need for specialty bed, Pressure redistribution bed/mattress(bed type) Mobility Interventions: Float heels, HOB 30 degrees or less, Pressure redistribution bed/mattress (bed type) Nutrition Interventions: Document food/fluid/supplement intake Friction and Shear Interventions: Apply protective barrier, creams and emollients, HOB 30 degrees or less, Minimize layers

## 2021-06-10 NOTE — PROGRESS NOTES
0630: Report received from off going RN.  Pt admitted for [facility] level of care with hospice diagnosis of multiple myeloma with symptoms to be managed of pain and agitation.  Patient in bed resting with eyes closed.  No s/sx of pain, dyspnea, or agitation observed.  FLACC 0/10.  Fentanyl patch 50mcg/hr visualized to right upper arm.   Pt did not require any PRN medications during the night. She does receive Haldol 2mg every 12 hours scheduled. RN has reviewed POC with CNA. Td Wasserman will remain in [facility] under [facility] LOC until demise.  Will continue to evaluate patient discharge plan for potential change of LOC.  Bed low and locked and all safety measures in place.  Door open. 0830: Resting with eyes closed, resps regular and non-labored. FLACC score remains 0/10. Feet cold to touch bilaterally, mottling observed. 1030: Pt resting with sonorus respirations. FLACC score remains 0/10. No family present. 1219: Son in room. Update given. Pt remains wit hut change. Scheduled Haldol dissolved in a small amount of warm water and administered via syringe. Pt attempted to swallow but needs encouragement and then has to swallow several times to clear throat. FLACC score remains 0/10.  Son without any voiced concerns at this time.

## 2021-06-11 NOTE — PROGRESS NOTES
:  Patient report from Spencer Clark RN. Patient ID by name and . Patient is Routine LOC. Patient resting with eyes closed. No s/sx of pain, dyspnea, or agitation observed. FLACC 0/10. Fentanyl patch visualized and is c/d/i.  RN has reviewed POC with CNA. Discharge plan to be evaluated by IDG team.  Bed low and locked and all safety measures in place. Family currently at bedside. Door remains closed at this time. :  Called to room by family. Patient with no heartbeat or respirations present. Fentanyl patch removed and wasted with Kendall Astudillo RN.

## 2021-06-11 NOTE — [facility] IDG MASTER NOTE
[address] [facility] Date: 06/11/21 Time: 3:05 PM 
 
___________________ Patient: Joel Valdez Coverage Information: 
   Payor: [facility] MEDICARE Plan: [facility] MEDICARE PART A AND B Subscriber ID: [id number] Phone Number: MRN: [id number] Current Benefit Period: Benefit Period 1 Start Date: 6/3/2021 End Date: 8/31/2021 Hospice Attending Provider: Jhoana Madrigal MD 
[address] [place] [address]  [id number] Phone: [phone] Fax: [phone] Level of Care: Routine Home Care 
 
 
___________________ Diagnoses: The primary encounter diagnosis was Acute encephalopathy. Diagnoses of Hypercalcemia, Idiopathic hypotension, Multiple myeloma not having achieved remission ([facility] [address] Acute renal failure with tubular necrosis ([facility] [address] Coronary artery disease involving native coronary artery of native heart without angina pectoris, Cancer associated pain, and Other megaloblastic anemia were also pertinent to this visit. Current Medications: 
 
Current Facility-Administered Medications:  
  ipratropium (ATROVENT) 0.02 % nebulizer solution 0.5 mg, 0.5 mg, Nebulization, Q4H PRN, Alyson Matute MD 
  fentaNYL (DURAGESIC) 50 mcg/hr patch 1 Patch, 1 Patch, TransDERmal, Q72H, Jhoana Madrigal MD, 1 Patch at 06/09/21 1750 
  haloperidoL (HALDOL) tablet 2 mg, 2 mg, Oral, Q12H, Jhoana Madirgal MD, 2 mg at 06/11/21 9715   morphine (ROXANOL) concentrated oral syringe 10 mg, 10 mg, Oral, Q1H PRN, Jhoana Madrigal MD, 10 mg at 06/11/21 1146   LORazepam (ATIVAN) tablet 1 mg, 1 mg, Oral, Q4H PRN, Jhoana Madrigal MD 
  diphenhydrAMINE (BENADRYL) injection 25 mg, 25 mg, IntraVENous, Q6H PRN, Jhoana Madrigal MD 
  acetaminophen (TYLENOL) tablet 650 mg, 650 mg, Oral, Q4H PRN, Jhoana Madrigal MD 
  LORazepam (ATIVAN) tablet 1 mg, 1 mg, Oral, Q4H PRN, Jhoana Madrigal MD, 1 mg at 06/09/21 9411   senna (SENOKOT) tablet 8.6 mg, 1 Tablet, Oral, BID, Jhoana Madrigal MD, 8.6 mg at 06/10/21 0940 
  alum-mag hydroxide-simeth (MYLANTA) oral suspension 30 mL, 30 mL, Oral, QID PRN, Naz Delaney MD 
  temazepam (RESTORIL) capsule 15 mg, 15 mg, Oral, QHS PRN, Naz Delaney MD 
  glycopyrrolate (ROBINUL) injection 0.2 mg, 0.2 mg, SubCUTAneous, Q4H PRN, Naz Delaney MD 
 
Orders: 
Orders Placed This Encounter  IP CONSULT TO SPIRITUAL CARE Standing Status:   Standing Number of Occurrences:   1 Order Specific Question:   Reason for Consult: Answer: Once on week one, then PRN. For [facility] Only. For contracted patients, their primary hospice will continue to manage spiritual care needs.  ADULT DIET Clear Liquid; hold Standing Status:   Standing Number of Occurrences:   1 Order Specific Question:   Primary Diet: Answer:   Clear Liquid Order Specific Question:   Likes/Dislikes/Preferences: Answer:   hold Dimitri [address] Standing Status:   Standing Number of Occurrences:   1  
 NURSING-MISCELLANEOUS: Comfort Care Measures CONTINUOUS Standing Status:   Standing Number of Occurrences:   1 Order Specific Question:   Description of Order: Answer:   Comfort Care Measures  SOLIS CATHETER, CARE 1. Solis Catheter care every shift and PRN 2. Notify Physician of Solis Catheter leakage, occlusion, gross adherent sediment or accidental removal 
3. Change Solis 30 days after insertion. Standing Status:   Standing Number of Occurrences:   [id number] [address] [place] May scan bladder PRN for urinary retention and or patient discomfort Standing Status:   Standing Number of Occurrences:   [id number]  
 NURSING ASSESSMENT:  SPECIFY Assess for GIP, routine, or respite level of care. Q SHIFT Routine Standing Status:   Standing Number of Occurrences:   1 Order Specific Question:   Please describe the test or procedure you would like to order.   
  Answer:   Assess for GIP, routine, or respite level of care.  PAIN ASSESSMENT Pain and Symptoms: Assess ever 4 hours and PRN, for GIP level of care. an 26-year-old woman found to have her principal diagnosis of multiple myeloma (kappa light chain) when she presented to [facility] last month with di. ..  
  an 26-year-old woman found to have her principal diagnosis of multiple myeloma (kappa light chain) when she presented to [facility] last month with diffuse pelvic and spinal pain. Family also described altered mental state for this previously independent but now severely debilitated octogenarian. Hypercalcemia was diagnosed as the principal cause of her metabolic encephalopathy. This failed to improve after administration of Zometa had corrected her hypercalcemia. An E. coli urinary tract infection was a secondary contributing factor which was also addressed with appropriate intravenous antibiotics. Patient continues to show marked agitation and delirium requiring parenteral Haldol for patient safety and comfort. Her son as responsible party has chosen to forego disease modifying therapy for the multiple myeloma and to limit further care to comfort measures only. Under the circumstances Ms. Ashvin Rodriguez life expectancy is less than a few weeks. In the interim she will continue to require parenteral psychotropic medication for delirium and parenteral opioid for severe bone pain. Once a stable oral regimen of analgesic and antipsychotic medication is achieved, appropriate place for the balance of the patient's remaining lifetime will be determined. Standing Status:   Standing Number of Occurrences:   [id number] Order Specific Question:   Please describe the test or procedure you would like to order. Answer:   Pain and Symptoms: Assess ever 4 hours and PRN, for GIP level of care.  ACTIVITY AS TOLERATED W/ASSIST Standing Status:   Standing   Number of Occurrences:   1  
 NURSING-MISCELLANEOUS: change hospice status from [facility] to routine level hospice care CONTINUOUS Standing Status:   Standing Number of Occurrences:   1 Order Specific Question:   Description of Order: Answer:   change hospice status from [facility] to routine level hospice care  DO NOT RESUSCITATE Standing Status:   Standing Number of Occurrences:   1 Order Specific Question:   Comfort Measures Only? Answer: Yes  OXYGEN CANNULA Liters per minute: 2; Indications for O2 therapy: RESPIRATORY DISTRESS CONTINUOUS Routine Standing Status:   Standing Number of Occurrences:   1 Order Specific Question:   Liters per minute: Answer:   2 Order Specific Question:   Indications for O2 therapy Answer:   RESPIRATORY DISTRESS  traMADoL (ULTRAM) 50 mg tablet Sig: Take 50 mg by mouth every eight (8) hours as needed.  DISCONTD: sodium chloride (NS) flush 3 mL  DISCONTD: sodium chloride (NS) flush 3 mL  DISCONTD: morphine injection 2 mg  DISCONTD: morphine injection 2 mg  DISCONTD: morphine injection 2 mg  DISCONTD: morphine injection 2 mg  LORazepam (ATIVAN) tablet 1 mg  DISCONTD: haloperidoL (HALDOL) tablet 2 mg  diphenhydrAMINE (BENADRYL) injection 25 mg  
 acetaminophen (TYLENOL) tablet 650 mg  LORazepam (ATIVAN) tablet 1 mg  senna (SENOKOT) tablet 8.6 mg  
 DISCONTD: haloperidol lactate (HALDOL) injection 2 mg  DISCONTD: haloperidol lactate (HALDOL) injection 2 mg  DISCONTD: haloperidol lactate (HALDOL) injection 2 mg  DISCONTD: haloperidol lactate (HALDOL) injection 2 mg  alum-mag hydroxide-simeth (MYLANTA) oral suspension 30 mL  temazepam (RESTORIL) capsule 15 mg  
 glycopyrrolate (ROBINUL) injection 0.2 mg  
 haloperidoL (HALDOL) tablet 2 mg  DISCONTD: fentaNYL (DURAGESIC) 25 mcg/hr patch 1 Patch  morphine (ROXANOL) concentrated oral syringe 10 mg  
 fentaNYL (DURAGESIC) 50 mcg/hr patch 1 Patch  ipratropium (ATROVENT) 0.02 % nebulizer solution 0.5 mg  
  Order Specific Question:   MODE OF DELIVERY Answer:   Shaye Armstrong Order Specific Question:   Initiate RT Bronchodilator Protocol Answer: Yes  INITIAL PHYSICIAN ORDER: HOSPICE Level Of Care: General Inpatient; Reason for Admission: symptom management: pain and delirium Standing Status:   Standing Number of Occurrences:   1 Order Specific Question:   Status Answer:   Hospice Order Specific Question:   Level Of Care Answer:   General Inpatient Order Specific Question:   Reason for Admission Answer:   symptom management: pain and delirium Order Specific Question:   Inpatient Hospitalization Certified Necessary for the Following Reasons Answer:   3. Patient receiving treatment that can only be provided in an inpatient setting (further clarification in H&P documentation) Order Specific Question:   Admitting Diagnosis Answer:   Multiple myeloma not having achieved remission ([facility] [address] [3311079] Order Specific Question:   Terminal Prognosis Diagnosis(es) Answer:   Orthostatic hypotension [458. 0. ICD-9-CM] Order Specific Question:   Terminal Prognosis Diagnosis(es) Answer:   Hypercalcemia [275.42. 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 For contracted patients, their primary hospice will continue to manage social work needs. Allergies: Allergies Allergen Reactions  Pcn [Penicillins] Rash Care Plan: 
Encounter Problems (Active) Problem: Anticipatory Grief Dates: Start: 06/04/21 Disciplines: Interdisciplinary Goal: Grief heard and acknowledged, anxiety reduced, patient coping identified, patient/family expressed gratitude Dates: Start: 06/04/21   Expected End: 06/18/21 Disciplines: Interdisciplinary Intervention: Assess grief responses Dates: Start: 06/04/21 Description: Assess for feelings of grief Intervention: Support grieving process Dates: Start: 06/04/21 Description: Address feelings of loss, anticipatory grief, expression of concern. Problem: Anxiety/Agitation Dates: Start: 06/03/21 Disciplines: Interdisciplinary Goal: Verbalize and demonstrate ability to manage anxiety Dates: Start: 06/03/21   Expected End: 06/12/21 Description: The patient/family/caregiver will verbalize and demonstrate ability to manage the patient's anxiety throughout hospice care. Disciplines: Interdisciplinary Intervention: Assess for anxiety/agitation Dates: Start: 06/03/21 Description: Assess for signs and symptoms of anxiety and agitation. Intervention: Instruction strategies to reduce anxiety/agitation Dates: Start: 06/03/21 Description: Instruct patient/caregiver on strategies to reduce anxiety/agitation. Problem: Emotional Support Needs Dates: Start: 06/07/21  Description: Pt, pt's son Samantha Albert, pt's sister Candy Patel, and pt's family will have their emotional support needs met weekly by SW. Disciplines: Interdisciplinary Goal: Patient/family is receiving emotional support Dates: Start: 06/07/21   Expected End: 06/18/21 Description: Pt, Israel Ewing, and family will receive emotional support each week through the use of education on the hospice experience, weekly check-ins, and validation of feelings. Disciplines: Interdisciplinary Intervention: Assess for emotional distress Dates: Start: 06/07/21 Description: SW will assess for emotional distress through the use of open-ended questions, motivational interviewing, and paraphrasing of discussions to ensure pt, Israel Ewing, and pt's family and heard accurately. Intervention: Provide emotional support of the family's cultural expressions of grief and loss Dates: Start: 06/07/21 Description: SW will provide emotional support of the family's cultural expression of grief, loss, and response to illness. Problem: End of Life Process Dates: Start: 06/03/21 Disciplines: Interdisciplinary Goal: Demonstrate understanding of end of life processes Dates: Start: 06/03/21   Expected End: 06/12/21 Description: Patient/caregiver will understand end of life processes. Disciplines: Interdisciplinary Intervention: Assess for signs/symptoms of terminal restlessness Dates: Start: 06/03/21 Intervention: Instruct on strategies to reduce terminal restlessness Dates: Start: 06/03/21 Intervention: Instruct on the dying process Dates: Start: 06/03/21 Intervention: Instruct: imminent death Dates: Start: 06/03/21 Intervention: Instruct: process at end of life Dates: Start: 06/03/21 Problem: Falls - Risk of   
 Dates: Start: 06/03/21 Disciplines: Interdisciplinary Goal: *Absence of Falls  Dates: Start: 06/03/21   Expected End: 06/19/21 Description: Document Lear Shaker Fall Risk and appropriate interventions in the flowsheet. Disciplines: Interdisciplinary Problem: Hospice Orientation Dates: Start: 06/03/21 Disciplines: Interdisciplinary Goal: Demonstrate understanding of hospice philosophy, plan of care, and home hospice program   
 Dates: Start: 06/03/21   Expected End: 06/06/21 Description: The patient/family/caregiver will demonstrate understanding of hospice philosophy, plan of care and the home hospice program as evidenced by participation in meeting the patient's psychosocial, spiritual, medical, and physical needs inclusive of medical supplies/equipment focusing on symptoms. Disciplines: Interdisciplinary Intervention: Instruct on hospice philosophy Dates: Start: 06/03/21 Intervention: Instruct: hospice orientation Dates: Start: 06/03/21 Intervention: Instruct: medical equipment Dates: Start: 06/03/21 Description: Instruct patient/caregiver on medical equipment and supplies. Intervention: Instruct: medical power of  and will Dates: Start: 06/03/21 Intervention: Instruct:terminal illness Dates: Start: 06/03/21 Problem: Infection - Risk of, Urinary Catheter-Associated Urinary Tract Infection Dates: Start: 06/05/21 Disciplines: Interdisciplinary Goal: *Absence of infection signs and symptoms Dates: Start: 06/05/21   Expected End: 06/11/21 Disciplines: Interdisciplinary Intervention: Urinary catheter needs assessment (eg: Impaired neurologic status; post void residual; spinal cord injury; urinary outflow obstruction; urinary retention; urinary incontinence; fluid imbalance) Dates: Start: 06/05/21 Intervention: Urine assessment (eg: Clarity; color; odor; volume) Dates: Start: 06/05/21  Intervention: Infection signs and symptoms monitoring - urinary tract (eg: Flank or suprapubic pain; burning; fever; dysuria; frequency; urgency; cloudy/bloody/foul odor urine; confusion/agitation; incontinence) Dates: Start: 06/05/21 Intervention: Lab monitoring (eg: Urinalysis; culture and sensitivity; complete blood count with differential) Dates: Start: 06/05/21 Intervention: Urinary catheter management (eg: Closed urinary catheter system maintenance; urinary catheter patency with free downhill flow; perineal care; monitor intake and output) Dates: Start: 06/05/21 Intervention: Urinary catheter discontinuation Dates: Start: 06/05/21 Description: REMINDER(s): 
Urinary catheter discontinuation promptly as indicated; remind physician to remove at or before day 5. Problem: Pain Dates: Start: 06/03/21 Disciplines: Interdisciplinary Goal: Verbalize satisfaction of level of comfort and symptom control Dates: Start: 06/03/21   Expected End: 06/12/21 Disciplines: Interdisciplinary Intervention: Assess effectiveness of pain management Dates: Start: 06/03/21 Intervention: Assess for signs/symptoms of acute pain Dates: Start: 06/03/21 Intervention: Assess for signs/symptoms of chronic pain Dates: Start: 06/03/21 Intervention: Instruct on non-pharmacological pain management Dates: Start: 06/03/21 Intervention: Instruct on pain scales Dates: Start: 06/03/21 Intervention: Instruct on pharmacological pain management Dates: Start: 06/03/21 Problem: Patient Education: Go to Patient Education Activity Dates: Start: 06/03/21 Disciplines: Interdisciplinary Goal: Patient/Family Education Dates: Start: 06/03/21   Expected End: 06/19/21 Disciplines: Interdisciplinary Problem: Patient Education: Go to Patient Education Activity Dates: Start: 06/05/21 Disciplines: Interdisciplinary  Goal: Patient/Family Education Dates: Start: 06/05/21 Disciplines: Interdisciplinary Problem: Pressure Injury - Risk of   
 Dates: Start: 06/05/21 Disciplines: Interdisciplinary Goal: *Prevention of pressure injury Dates: Start: 06/05/21   Expected End: 06/13/21 Description: Document Isaac Scale and appropriate interventions in the flowsheet. Disciplines: Interdisciplinary Care Plan Problems/Goals Progressing Towards Goal (11) *Absence of Falls (Falls - Risk of) Disciplines:  Interdisciplinary Expected end:  06/19/21 Outcome: Progressing Towards Goal By Vladimir Lo RN on 06/11/21 8176 Patient/Family Education (Patient Education: Go to Patient Education Activity) Disciplines:  Interdisciplinary Expected end:  06/19/21 Outcome: Progressing Towards Goal By Vladimir Lo RN on 06/11/21 4410 Demonstrate understanding of hospice philosophy, plan of care, and home hospice program [facility] Orientation) Disciplines:  Interdisciplinary Expected end:  06/06/21 Outcome: Progressing Towards Goal By Vladimir Lo RN on 06/11/21 0833 Verbalize satisfaction of level of comfort and symptom control (Pain) Disciplines:  Interdisciplinary Expected end:  06/12/21 Outcome: Progressing Towards Goal By Vladimir Lo RN on 06/11/21 8386 Verbalize and demonstrate ability to manage anxiety (Anxiety/Agitation) Disciplines:  Interdisciplinary Expected end:  06/12/21 Outcome: Progressing Towards Goal By Vladimir Lo RN on 06/11/21 7307 Demonstrate understanding of end of life processes (End of Life Process) Disciplines:  Interdisciplinary Expected end:  06/12/21 Outcome: Progressing Towards Goal By Vladimir Lo RN on 06/11/21 6882  Grief heard and acknowledged, anxiety reduced, patient coping identified, patient/family expressed gratitude (Anticipatory Grief) Disciplines:  Interdisciplinary Expected end:  06/18/21 Outcome: Progressing Towards Goal By Melvin Robles RN on 06/11/21 5093 *Absence of infection signs and symptoms (Infection - Risk of, Urinary Catheter-Associated Urinary Tract Infection) Disciplines:  Interdisciplinary Expected end:  06/11/21 Outcome: Progressing Towards Goal By Melvin Robles RN on 06/11/21 2634 *Prevention of pressure injury (Pressure Injury - Risk of) Disciplines:  Interdisciplinary Expected end:  06/13/21 Outcome: Progressing Towards Goal By Melvin Robles RN on 06/11/21 6684 Patient/Family Education (Patient Education: Go to Patient Education Activity) Disciplines:  Interdisciplinary Expected end:  - Outcome: Progressing Towards Goal By Melvin Robles RN on 06/11/21 0626 Patient/family is receiving emotional support (Emotional Support Needs) Disciplines:  Interdisciplinary Expected end:  06/18/21 Outcome: Progressing Towards Goal By Selena Sky LMSW on 06/11/21 1148  
  
  
 
  
  
  
  
  
 
 
___________________ Care Team Notes POC/IDG Notes [address] [place] [address] IDG Nurse Notes by Jaime Guzmán RN at 06/11/21 1505  Version 1 of 1 Author: Jaime Guzmán RN Service: NURSING Author Type: Registered Nurse Filed: 06/11/21 1505 Date of Service: 06/11/21 1505 Status: Signed : Jaime Guzmán RN (Registered Nurse) Patient: Flores De Leon Date: 06/11/21 Time: 3:05 PM 
 
[facility] Nurse Notes Follow up IDG: Pt is a [de-identified]year-old Female with myeloma who is here GIP level of care for management of delirium. Schneider with UOP of 445ml IV access: None PO intake: NPO Oxygen: 2/L Wounds: Surgical Spine Incision PRN medications: Haldol 2mg x 2 doses for pain / Haldol 2mg x 1 dose for pain Scheduled meds:  Fentanyl 50mcg patch Q 72 hours / Haldol 2mg Q 12 hours / Senna 8.6mg BID Plan: Change to routine LOC as of 6/9/2021. Comprehensive plan of care reviewed. IDG and pt./family in agreement with plan of care. The IDG identifies through on-going assessment when a change is needed to the POC; the pt/family will receive care and services necessitated by changes in POC. Medications reviewed by the pharmacist and Medical Director. Signed by: Katharina Ott RN 
 
  
[address] [place] [address] IDG  Notes by Mehrna Rosado LMSW at 06/11/21 1208  Version 1 of 1 Author: Mehran Rosado LMSW Service: Denia Minus Author Type: Licensed Masters in Social Work Filed: 06/11/21 1209 Date of Service: 06/11/21 1208 Status: Signed : Mehran Rosado [facility] (Licensed Masters in Social Work) Patient: Torrey Reveal Date: 06/11/21 Time: 12:08 PM 
 
[facility]  Notes Pt has changed LOC from GIP to routine. SW will discuss this change with pt's family. No changes in the plan of care. SW will continue to provide ongoing emotional support and assessment of psychosocial and bereavement concerns, as well as needs until discharge. Signed by: Reggie Hull LMSW [facility] IDG Volunteer Notes by Susie Hinton at 06/11/21 1156  Version 1 of 1 Author: Susie Hinton Service: Hospice and Palliative Care Author Type: Hospice Volunteer/ Filed: 06/11/21 1159 Date of Service: 06/11/21 1156 Status: Signed : Susie Hinton [facility] Volunteer/) Patient: Torrey Reveal Date: 06/11/21 Time: 11:56 AM 
 
[facility] Volunteer Notes Volunteer  Hospice Interdisciplinary Plan of Care Review Status Codes  C=Continued R=Revised RS = Resolved NV= No visits per Infection Control Policy or family request 
  
C. Volunteer Goal: Hospice house volunteer (s) enhances the quality of remaining life while patient is at the hospice house.    
 
Pt will continue to receive general support by volunteers as evidenced by comfort bag delivery, snack cart, supplies to the room, companionship visits, pet therapy and/or therapeutic music. Any other special requests or information for volunteer: none at this time. Signed by: Margarita Huertas [facility]  Notes by Meredith Martinez at 06/11/21 1140  Version 2 of 2 Author: Meredith Martinez Service: Spiritual Care Author Type: Pastoral Care Filed: 06/11/21 1147 Date of Service: 06/11/21 1140 Status: Addendum : [facility] [place] [place] Related Notes: Original Note by Meredith Martinez (Pastoral Care) filed at 06/11/21 1140 Patient: Tom Spikes Date: 06/11/21 Time: 11:40 AM 
 
[facility]  Notes Chaplains provided spiritual and emotional care. Continue current plan of care and monitor for changes. Visit as requested or referred. Assist with bereavement when needed. Lenviktor Castorena will continue to participate in [address] discussions. Signed by: Jose Orozco [facility]  Notes by Meredith Martinez at 06/11/21 1140  Version 1 of 2 Author: Meredith Martinez Service: Spiritual Care Author Type: Pastoral Care Filed: 06/11/21 1140 Date of Service: 06/11/21 1140 Status: Signed : Meredith Martinez [facility]) Related Notes: Addendum by Meredith Martinez ([facility]) filed at 06/11/21 1147 Patient: Tom Spikes Date: 06/11/21 Time: 11:40 AM 
 
[facility]  Notes  provided spiritual and emotional care. Continue current plan of care and monitor for changes. Visit as requested or referred. Assist with bereavement when needed. Lenoria Carlosbish will continue to participate in [address] discussions. Signed by: Jose Orozco [address] [place] [address] Nurse Notes by Elena Drake RN at 06/07/21 1443  Version 1 of 1 Author: Elena Drake RN Service: NURSING Author Type: Registered Nurse Filed: 06/07/21 1443 Date of Service: 06/07/21 1443 Status: Signed  : Damian Han Lois Wong, RN (Registered Nurse) Patient: Cristofer Vásquez Date: 06/07/21 Time: 2:43 PM 
 
[facility] Nurse Notes Follow Up IDG: Pt is a [de-identified]year-old Female with myeloma who is here [facility] level of care for management of delirium. Schneider with UOP of 1150ml IV access: Right PIV 
 PO intake: NPO Oxygen: 2/L Wounds: Left Thigh Wound / Mid lower Spine surgical incision PRN medications: Ativan 1mg x 2 doses for agitation / Morphine 2mg x 3 doses for pain Scheduled meds:  Haldol 2mg Q 12 hours / Morphine 2mg Q 6 hours / Senna 8.6mg Plan: Fentanyl 25mcg patch Q 72 hours for pain. D/C Scheduled morphine 2mg Q 6 hours. Add Roxinal 10mg Q 1hour PRN for pain. Change Haldol 2mg to P.O. Comprehensive plan of care reviewed. IDG and pt./family in agreement with plan of care. The IDG identifies through on-going assessment when a change is needed to the POC; the pt/family will receive care and services necessitated by changes in POC. Medications reviewed by the pharmacist and Medical Director. Signed by: Renato Reece RN 
 
  
[address] [place] [address] IDG  Notes by Gaby Holcomb LMSW at 06/07/21 1232  Version 1 of 1 Author: Gaby Holcomb LMSW Service: Krish Powers Author Type: Licensed Masters in Social Work Filed: 06/07/21 1242 Date of Service: 06/07/21 1232 Status: Signed : Gaby Holcomb [address] [place] [address] (Licensed Masters in Social Work) Patient: Cristofer Vásquez Date: 06/07/21 Time: 12:32 PM 
 
[facility]  Notes Pt remains under [facility] level of care. No changes in the plan of care. SW will continue to provide ongoing emotional support and assessment of psychosocial and bereavement concerns, as well as needs until discharge. Signed by: Ludwin Hunter LMSW [facility] IDG Volunteer Notes by Frederick Gallego at 06/07/21 1231  Version 1 of 1 Author: Frederick Gallego Service: Hospice and Palliative Care Author Type: Hospice Volunteer/  Filed: 06/07/21 1237 Date of Service: 06/07/21 1231 Status: Signed : Son Reece [facility] Volunteer/) Patient: Luis Montero Date: 06/07/21 Time: 12:31 PM 
 
[facility] Volunteer Notes Volunteer  Hospice Interdisciplinary Plan of Care Review Status Codes  C=Continued R=Revised RS = Resolved NV= No visits per Infection Control Policy or family request 
  
C. Volunteer Goal: Hospice house volunteer (s) enhances the quality of remaining life while patient is at the hospice house. Pt will continue to receive general support by volunteers as evidenced by comfort bag delivery, snack cart, supplies to the room, companionship visits, pet therapy and/or therapeutic music. Any other special requests or information for volunteer:  
    
 
 
 
 
Signed by: Wilbur Ashby [facility] [address] IDG Nurse Notes by Michele Marshall RN at 06/04/21 1344  Version 1 of 1 Author: Michele Marshall RN Service: NURSING Author Type: Registered Nurse Filed: 06/04/21 1136 Date of Service: 06/04/21 1344 Status: Signed : Michele Marshall RN (Registered Nurse) Patient: Luis Montero Date: 06/04/21 Time: 1:44 PM 
 
[facility] Nurse Notes 1st IDG: Pt is a [de-identified]year-old Female with myloma who is here GIP level of care for management of delirium. Schneider with UOP of 825ml IV access: PIV Right FA 
 PO intake: Bites and Sips Oxygen: Room AIr Wounds: Left thigh / Mid Spine surgical site PRN medications: Haldol 2mg BID x 2 doses for agitation / Morphine 2mg x 4 doses for pain Scheduled meds: Haldol 2mg Q 12 hours / Morphine 2mg Q 6 hours / Senna 8.6mg BID Plan: D/C scheduled Haldol. Comprehensive plan of care reviewed. IDG and pt./family in agreement with plan of care. The IDG identifies through on-going assessment when a change is needed to the POC; the pt/family will receive care and services necessitated by changes in POC. Medications reviewed by the pharmacist and Medical Director. Signed by: Margie Ugalde RN 
 
  
[facility] IDG Volunteer Notes by Doc Hitchcock at 06/04/21 1140  Version 1 of 1 Author: Doc Hitchcock Service: Hospice and Palliative Care Author Type: Hospice Volunteer/ Filed: 06/04/21 1202 Date of Service: 06/04/21 1140 Status: Signed : Doc Hitchcock [facility] Volunteer/) Patient: Abel Cummins Date: 06/04/21 Time: 11:40 AM 
 
[facility]s Initial Hospice House IDG Note: (to be used at first IDG on patient) Volunteer  Hospice Interdisciplinary Plan of Care Review Status Codes I = Initiated   NV= No visits per Infection Control Policy or family request 
  
I. Volunteer Goal: Hospice house volunteer (s) enhances the quality of remaining life while patient is at the hospice house. Interventions: Ulus Reusing Ulus Reusing Liane Hammans Volunteer (s) will provide companionship to the patient and/or family by visiting at the hospice house Ulus Reusing Ulus Reusing Liane Hammans Volunteer (s) will provide respite as needed when requested by patient and/or family. Ulus Reusing Larwence Keely  Volunteer will provide activities such as music, reading, pet therapy, etc. as requested. Ulus Reusing Larwence Keely  Comfort bag delivered. Any other special requests or information regarding volunteer services: 
 Staff have requested visits for companionship and volunteers have been notified by this [address]. No further needs identified at this time. [facility] IDG  Notes by Kyleigh Wilson at 06/04/21 1142  Version 1 of 1 Author: Kyleigh Wilson Service: Spiritual Care Author Type: Pastoral Care Filed: 06/04/21 1144 Date of Service: 06/04/21 1142 Status: Signed : Kyleigh Wilson (Pastoral Care) Patient: Abel Cummins Date: 06/04/21 Time: 11:42 AM 
 
[facility]  Notes Assessment pending for spiritual and bereavement care. Signed by: Jerona Mom [facility] IDG  Notes by Lidia Henderson LMSW at 06/04/21 1138  Version 1 of 1  
 Author: Kalie Macedo LMSW Service: Licensed Clinical  Author Type:  Filed: 06/04/21 1139 Date of Service: 06/04/21 1138 Status: Signed : Kalie Macedo LMSW () SW has read the initial comprehensive assessment and plan of care and acknowledges no urgent needs and is in agreement with plan SW to assess coping and needs each visit and offer availability. [facility]  Notes by Julius Cedeno at 06/04/21 8586  Version 1 of 1 Author: Julius Cedeno Service: Spiritual Care Author Type: Pastoral Care Filed: 06/04/21 0928 Date of Service: 06/04/21 8377 Status: Signed : Julius Cedeno (Pastoral Care) Patient: Karen Leonardo Date: 06/04/21 Time: 9:27 AM 
 
[facility]  Notes / Grief Counselor has reviewed  Initial Comprehensive Assessment and plan of care. Bereavement and Spiritual Care Assessments to be completed and plan of care put in place to meet the needs of patient and familly. Signed by: Brittnee Carbajal [address] [place] [address] Nurse Notes by Ramiro Bolanos RN at 06/03/21 1944  Version 1 of 1 Author: Ramiro Bolanos RN Service: NURSING Author Type: Registered Nurse Filed: 06/03/21 1944 Date of Service: 06/03/21 1944 Status: Signed : Ramiro Bolanos RN (Registered Nurse) Patient: Karen Leonardo Date: 06/03/21 Time: 7:44 PM 
 
[facility] Nurse Notes Mireya Stevenson is an [de-identified]year old female admitted to room 106 for hospice diagnosis of multiple myeloma with mets to the bone. Level of care is general inpatient for management of pain, agitation and delirium. Pt is alert and able to answer simple yes and no questions. She is able to state her name and date of birth and present time. She denies pain and shortness of breath. Physical assessment completed.  Lung sounds clear but diminished in bases, heart sounds regular, abdomen soft with extensive ecchymosis to lower quadrants, pt has active bowel sounds, pt has dressing to left thigh with oozing serosanguinous drainage, changed dressing and applied 4 x 4, and two abd dressings wrapped with kerlix. Extremities warm with palpable pulses. Sacrum and bilateral heels with blanchable redness. Report from hospital nurse indicates pt has had periods of agitation and confusion alternating with periods of somnolence. Explained procedure for placing cook. 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 Placed #16 cook catheter with over 500 cc output of clear yellow urine. Pt tolerated without moaning or complaint.  
  
1619 pt's son, Macie Maria and daughter in law, [place] at bedside. Discussed hospice, plan of care, family situation. They state they are on board with hospice, they want her to be comfortable. Macie Maria states pt was living alone and caring for herself prior to a couple of falls which led to the diagnosis of multiple myeloma. They state she became very agitated and confused in the hospital. Macie Maria states he has not left her side for days. Gave them the blue book oriented them to room and hospice. Asked them if they had any questions. Administered scheduled medication after explaining prn medication.   
1749 pt resting quietly with snoring. Not grimacing, groaning or agitation. Held senna due to pt sleeping 
  
 
 
 
Signed by: Drake Anderson RN Care Team Present:  
Team Members Present: Physician, Nurse, , Vol Coordinator, [address] [place] [address] Physician Team Member: Dr. Cruz Holcomb Nurse Team Member: Eric Padilla Social Work Team Member: Constantin Tai  Team Member: Debbie Bergman Vol Coordinator: Star Holbrook

## 2021-06-11 NOTE — PROGRESS NOTES
SW spoke with pt's son Momo Peter and daughter-in-law about change in [address] for pt. They voiced understanding and expressed financial concerns around paying the room and board fee. SW gave the financial application to the family and they will fill it out and return to  for SW to turn in for them. [employer] will follow up with the family on Monday with any changes.

## 2021-06-11 NOTE — [facility] IDG SOCIAL WORKER NOTES
Patient: Paulino Soto Date: 06/11/21 Time: 12:08 PM 
 
[facility]  Notes Pt has changed LOC from GIP to routine. SW will discuss this change with pt's family. No changes in the plan of care. SW will continue to provide ongoing emotional support and assessment of psychosocial and bereavement concerns, as well as needs until discharge. Signed by: Ma Gaucher, LMSW

## 2021-06-11 NOTE — [facility] IDG VOLUNTEER NOTES
Patient: Danilo Edmonds Date: 06/11/21 Time: 11:56 AM 
 
[facility] Volunteer Notes Volunteer  Hospice Interdisciplinary Plan of Care Review Status Codes  C=Continued R=Revised RS = Resolved NV= No visits per Infection Control Policy or family request 
  
C. Volunteer Goal: Hospice house volunteer (s) enhances the quality of remaining life while patient is at the hospice house. Pt will continue to receive general support by volunteers as evidenced by comfort bag delivery, snack cart, supplies to the room, companionship visits, pet therapy and/or therapeutic music. Any other special requests or information for volunteer: none at this time. Signed by: Dayton Salamanca

## 2021-06-11 NOTE — PROGRESS NOTES
193 Report received from [facility], [facility]. Care assumed and pt identified by name and . Pt GIP level of care due to pain and agitation symptoms secondary to Multiple Myeloma. Pt is currently resting in bed with eyes opened with a modest amount of expiratory prolongation accompanied by snoring with no distress observed or voiced at this time. Pt without any s/sx of agitation, anxiety, pain, dyspnea, sob, n/v or seizures. FLACC score of 0/10. Bed low and locked, siderails x2, call light within pt's reach. Functional tab alert on and attached to pt. CNA operating under RN supervision. 213:  Writer heard pt making a mild coughing/moaning sound. Writer entered room and asked pt if she was uncomfortable. Pt gently shook head left to right, but did not make eye contact/follow writer. Call light in reach. No s/sx of distress observed or voiced. Pt has relaxed facial features. FLACC score 0/10. 
 
2304:  Pt resting in bed with eyes slightly opened. No distress visualized. No s/sx of agitation, anxiety, pain, dyspnea, sob, n/v or seizures. Respirations present. Call light in reach. Tab alert on. Bed remains lowered and locked. Pt turned for comfort. 
 
0101:  No acute changes at this time. Pt remains sleeping with unlabored breathing. Call light remains in reach. Tab alarm remains connected to patient. No s/sx of agitation, anxiety, pain, dyspnea, sob, n/v or seizures. Bed in lowest locked position with siderails x2. 
 
0304:  Pt resting in bed, no distress observed. Respirations present. Call light remains in reach. Tab alarm remains connected to patient. No s/sx of agitation, anxiety, pain, dyspnea, sob, n/v or seizures. Pt turned for comfort. 0534:  Pt's eyes are open, she is not responsive to voice or touch, however, respirations present and unlabored. Call light in reach. No s/sx of distress observed or voiced. FLACC score 0/10. Report given to JARRETT Lopez.

## 2021-06-11 NOTE — PROGRESS NOTES
Problem: Emotional Support Needs Goal: Patient/family is receiving emotional support Description: Pt, Mamie Mew, and family will receive emotional support each week through the use of education on the hospice experience, weekly check-ins, and validation of feelings.   
Outcome: Progressing Towards Goal

## 2021-06-11 NOTE — [facility] IDG NURSE NOTES
Patient: Ricardo Peng Date: 06/11/21 Time: 3:05 PM 
 
[facility] Nurse Notes Follow up IDG: Pt is a [de-identified]year-old Female with myeloma who is here GIP level of care for management of delirium. Schneider with UOP of 445ml IV access: None PO intake: NPO Oxygen: 2/L Wounds: Surgical Spine Incision PRN medications: Haldol 2mg x 2 doses for pain / Haldol 2mg x 1 dose for pain Scheduled meds:  Fentanyl 50mcg patch Q 72 hours / Haldol 2mg Q 12 hours / Senna 8.6mg BID Plan: Change to routine LOC as of 6/9/2021. Comprehensive plan of care reviewed. IDG and pt./family in agreement with plan of care. The IDG identifies through on-going assessment when a change is needed to the POC; the pt/family will receive care and services necessitated by changes in POC. Medications reviewed by the pharmacist and Medical Director.  
 
 
Signed by: Yuli Kearns RN

## 2021-06-11 NOTE — [facility] IDG CHAPLAIN NOTES
Patient: Citlaly Menon Date: 06/11/21 Time: 11:40 AM 
 
[facility]  Notes Chaplains provided spiritual and emotional care. Continue current plan of care and monitor for changes. Visit as requested or referred. Assist with bereavement when needed. Maida Thomaschantel will continue to participate in [address] discussions. Signed by: Yanet Felder

## 2021-06-11 NOTE — PROGRESS NOTES
0700- Report received, patient resting quietly in bed with no s/sx pain or distress. Call light within reach. Bed is low and locked, tab alert in place, and door left open for continuous monitoring. Patient is currently under GIP loc. Will continue to assess need for change in LOC and continue to collaborate with IDG team regarding d/c planning. 1146- prn roxanol given for pain with turning. flacc 6/10. 
 
1242- patient now with flacc 0/10.  
 
1315- son rajat and his wife now in room and updated on patient condition. Patient minimally responsive and not talking or interacting. 1515- cool cloth placed pt head as she feels warm. No s/sx of pain or distress. Pt has been changed to routine loc. 1627- prn morphine given for s/sx pain aeb increased bp. Patient turned to R side. Moaned with turning. flacc 3/10.  
 
1740- patient continues to move R arm. Prn roxanol and prn lorazepam given for pain and agitation. flacc 4/10.  
 
1805- Patient resting quietly.  flacc 0-1/10.  
 
1900 - report given to javy farnsworth

## 2021-06-11 NOTE — PROGRESS NOTES
Problem: Falls - Risk of 
Goal: *Absence of Falls Description: Document Burrell Canavan Fall Risk and appropriate interventions in the flowsheet. Outcome: Progressing Towards Goal 
Note: Fall Risk Interventions: 
Mobility Interventions: Bed/chair exit alarm Mentation Interventions: Adequate sleep, hydration, pain control, Bed/chair exit alarm, Door open when patient unattended Medication Interventions: Bed/chair exit alarm Elimination Interventions: Bed/chair exit alarm, Call light in reach History of Falls Interventions: Bed/chair exit alarm, Door open when patient unattended Problem: Pain Goal: Verbalize satisfaction of level of comfort and symptom control Outcome: Progressing Towards Goal 
  
Problem: Anxiety/Agitation Goal: Verbalize and demonstrate ability to manage anxiety Description: The patient/family/caregiver will verbalize and demonstrate ability to manage the patient's anxiety throughout hospice care. Outcome: Progressing Towards Goal 
  
Problem: Infection - Risk of, Urinary Catheter-Associated Urinary Tract Infection Goal: *Absence of infection signs and symptoms Outcome: Progressing Towards Goal 
  
Problem: Pressure Injury - Risk of 
Goal: *Prevention of pressure injury Description: Document Isaac Scale and appropriate interventions in the flowsheet. Outcome: Progressing Towards Goal 
Note: Pressure Injury Interventions: 
Sensory Interventions: Assess changes in LOC, Float heels, Keep linens dry and wrinkle-free, Minimize linen layers, Pressure redistribution bed/mattress (bed type) Moisture Interventions: Absorbent underpads, Maintain skin hydration (lotion/cream), Minimize layers Activity Interventions: Assess need for specialty bed, Pressure redistribution bed/mattress(bed type) Mobility Interventions: Float heels, HOB 30 degrees or less, Pressure redistribution bed/mattress (bed type) Nutrition Interventions: Document food/fluid/supplement intake Friction and Shear Interventions: Apply protective barrier, creams and emollients, HOB 30 degrees or less, Minimize layers

## 2023-06-14 NOTE — PROGRESS NOTES
Problem: Falls - Risk of 
Goal: *Absence of Falls Description: Document Adriana Ni Fall Risk and appropriate interventions in the flowsheet. 5/22/2021 2235 by Myles Dorsey RN Outcome: Progressing Towards Goal 
Note: Fall Risk Interventions: 
Mobility Interventions: Assess mobility with egress test, OT consult for ADLs, Patient to call before getting OOB, PT Consult for mobility concerns, Strengthening exercises (ROM-active/passive), PT Consult for assist device competence Medication Interventions: Assess postural VS orthostatic hypotension, Evaluate medications/consider consulting pharmacy, Patient to call before getting OOB, Teach patient to arise slowly Elimination Interventions: Call light in reach, Stay With Me (per policy), Elevated toilet seat, Patient to call for help with toileting needs, Toilet paper/wipes in reach, Toileting schedule/hourly rounds History of Falls Interventions: Consult care management for discharge planning, Door open when patient unattended, Evaluate medications/consider consulting pharmacy, Investigate reason for fall, Room close to nurse's station, Utilize gait belt for transfer/ambulation, Assess for delayed presentation/identification of injury for 48 hrs (comment for end date), Vital signs minimum Q4HRs X 24 hrs (comment for end date), Bed/chair exit alarm 5/22/2021 2232 by Myles Dorsey RN Note: Fall Risk Interventions: 
Mobility Interventions: Assess mobility with egress test, OT consult for ADLs, Patient to call before getting OOB, PT Consult for mobility concerns, Strengthening exercises (ROM-active/passive), PT Consult for assist device competence Medication Interventions: Assess postural VS orthostatic hypotension, Evaluate medications/consider consulting pharmacy, Patient to call before getting OOB, Teach patient to arise slowly Elimination Interventions: Call light in reach, Stay With Me (per policy), Elevated toilet seat, Patient to call for help with toileting needs, Toilet paper/wipes in reach, Toileting schedule/hourly rounds History of Falls Interventions: Consult care management for discharge planning, Door open when patient unattended, Evaluate medications/consider consulting pharmacy, Investigate reason for fall, Room close to nurse's station, Utilize gait belt for transfer/ambulation, Assess for delayed presentation/identification of injury for 48 hrs (comment for end date), Vital signs minimum Q4HRs X 24 hrs (comment for end date), Bed/chair exit alarm Problem: Patient Education: Go to Patient Education Activity Goal: Patient/Family Education Outcome: Progressing Towards Goal 
  
Problem: Nausea/Vomiting (Adult) Goal: *Absence of nausea/vomiting Outcome: Progressing Towards Goal 
  
Problem: Altered Thought Process (Adult/Pediatric) Goal: Interventions Outcome: Progressing Towards Goal 
  
Problem: Patient Education: Go to Patient Education Activity Goal: Patient/Family Education Outcome: Progressing Towards Goal 
  
Problem: Delirium Prevention Goal: Interventions Outcome: Progressing Towards Goal 
  
 Curettage Text: The wound bed was treated with curettage after the biopsy was performed.